# Patient Record
Sex: MALE | Race: WHITE | Employment: OTHER | URBAN - METROPOLITAN AREA
[De-identification: names, ages, dates, MRNs, and addresses within clinical notes are randomized per-mention and may not be internally consistent; named-entity substitution may affect disease eponyms.]

---

## 2017-02-07 LAB — LDL-C, EXTERNAL: 115

## 2017-04-28 ENCOUNTER — HOSPITAL ENCOUNTER (OUTPATIENT)
Dept: ULTRASOUND IMAGING | Age: 72
Discharge: HOME | End: 2017-04-28
Attending: INTERNAL MEDICINE
Payer: MEDICARE

## 2017-04-28 DIAGNOSIS — N18.9 CHRONIC KIDNEY DISEASE, UNSPECIFIED: ICD-10-CM

## 2017-04-28 PROCEDURE — 76770 US EXAM ABDO BACK WALL COMP: CPT

## 2017-08-17 ENCOUNTER — HOSPITAL ENCOUNTER (OUTPATIENT)
Dept: ULTRASOUND IMAGING | Age: 72
Discharge: HOME | End: 2017-08-17
Attending: FAMILY MEDICINE
Payer: MEDICARE

## 2017-08-17 DIAGNOSIS — R79.89 ELEVATED LFTS: ICD-10-CM

## 2017-08-17 PROCEDURE — 76700 US EXAM ABDOM COMPLETE: CPT

## 2017-12-22 ENCOUNTER — HOSPITAL ENCOUNTER (OUTPATIENT)
Dept: CT IMAGING | Age: 72
Discharge: HOME | End: 2017-12-22
Attending: SPECIALIST
Payer: MEDICARE

## 2017-12-22 DIAGNOSIS — R94.5 NONSPECIFIC ABNORMAL RESULTS OF LIVER FUNCTION STUDY: ICD-10-CM

## 2017-12-22 DIAGNOSIS — R13.10 DYSPHAGIA: ICD-10-CM

## 2017-12-22 DIAGNOSIS — R63.4 ABNORMAL WEIGHT LOSS: ICD-10-CM

## 2017-12-22 PROCEDURE — 74011636320 HC RX REV CODE- 636/320: Performed by: SPECIALIST

## 2017-12-22 PROCEDURE — 74011250636 HC RX REV CODE- 250/636: Performed by: SPECIALIST

## 2017-12-22 PROCEDURE — 74177 CT ABD & PELVIS W/CONTRAST: CPT

## 2017-12-22 PROCEDURE — 74011000255 HC RX REV CODE- 255: Performed by: SPECIALIST

## 2017-12-22 RX ORDER — SODIUM CHLORIDE 9 MG/ML
50 INJECTION, SOLUTION INTRAVENOUS
Status: COMPLETED | OUTPATIENT
Start: 2017-12-22 | End: 2017-12-22

## 2017-12-22 RX ORDER — BARIUM SULFATE 20 MG/ML
900 SUSPENSION ORAL
Status: COMPLETED | OUTPATIENT
Start: 2017-12-22 | End: 2017-12-22

## 2017-12-22 RX ORDER — SODIUM CHLORIDE 0.9 % (FLUSH) 0.9 %
10 SYRINGE (ML) INJECTION
Status: COMPLETED | OUTPATIENT
Start: 2017-12-22 | End: 2017-12-22

## 2017-12-22 RX ADMIN — SODIUM CHLORIDE 50 ML/HR: 900 INJECTION, SOLUTION INTRAVENOUS at 14:58

## 2017-12-22 RX ADMIN — BARIUM SULFATE 900 ML: 21 SUSPENSION ORAL at 14:58

## 2017-12-22 RX ADMIN — Medication 10 ML: at 14:58

## 2017-12-22 RX ADMIN — IOPAMIDOL 100 ML: 755 INJECTION, SOLUTION INTRAVENOUS at 14:58

## 2017-12-26 ENCOUNTER — HOSPITAL ENCOUNTER (OUTPATIENT)
Dept: GENERAL RADIOLOGY | Age: 72
Discharge: HOME | End: 2017-12-26
Attending: SPECIALIST
Payer: MEDICARE

## 2017-12-26 DIAGNOSIS — R63.4 LOSS OF WEIGHT: ICD-10-CM

## 2017-12-26 DIAGNOSIS — Z79.01 LONG-TERM (CURRENT) USE OF ANTICOAGULANTS: ICD-10-CM

## 2017-12-26 DIAGNOSIS — R94.5 NONSPECIFIC ABNORMAL RESULTS OF LIVER FUNCTION STUDY: ICD-10-CM

## 2017-12-26 DIAGNOSIS — R13.10 DYSPHAGIA: ICD-10-CM

## 2017-12-26 PROCEDURE — 74220 X-RAY XM ESOPHAGUS 1CNTRST: CPT

## 2018-01-05 ENCOUNTER — HOSPITAL ENCOUNTER (OUTPATIENT)
Dept: GENERAL RADIOLOGY | Age: 73
Discharge: HOME | End: 2018-01-05
Attending: PHYSICIAN ASSISTANT
Payer: MEDICARE

## 2018-01-05 DIAGNOSIS — R13.10 DYSPHAGIA: ICD-10-CM

## 2018-01-05 DIAGNOSIS — R63.4 UNEXPLAINED WEIGHT LOSS: ICD-10-CM

## 2018-01-05 DIAGNOSIS — R74.8 ABNORMAL LIVER ENZYMES: ICD-10-CM

## 2018-01-05 DIAGNOSIS — Z86.010 HX OF COLONIC POLYPS: ICD-10-CM

## 2018-01-05 DIAGNOSIS — R13.13 PHARYNGEAL DYSPHAGIA: ICD-10-CM

## 2018-01-05 PROCEDURE — G8996 SWALLOW CURRENT STATUS: HCPCS

## 2018-01-05 PROCEDURE — 74230 X-RAY XM SWLNG FUNCJ C+: CPT

## 2018-01-05 PROCEDURE — G8998 SWALLOW D/C STATUS: HCPCS

## 2018-01-05 PROCEDURE — G8997 SWALLOW GOAL STATUS: HCPCS

## 2018-01-05 PROCEDURE — 92611 MOTION FLUOROSCOPY/SWALLOW: CPT

## 2018-01-05 NOTE — PROGRESS NOTES
[address] [place] [address]    Speech Pathology Modified barium swallow Study with cms g codes  Patient: Venus Baldwin (35 y.o. male)  Date: 1/5/2018  Referring Provider:  Dr. Carey Delvalle:   Pt comes in with 40 lb wt loss. Occasionally vomits but not everyday. He reports no appetite and drinks Ensure, soup, fruit cup, oatmeal and cookies. He does not report choking or coughing on any texture. Has not had a recent pneumonia. He had a barium swallow, aspirated on that exam and then referred for MBS. Barium swallow was wnl. OBJECTIVE:   Past Medical History:  Past Medical History:   Diagnosis Date    Arrhythmia     afib    Arthritis     Atrial fibrillation ([facility] [address] Dx 5/19/14    Dr Annie Irvin [phone]    Hypertension     Hypertrophy (benign) of prostate     Thromboembolus [facility]) 2003    s/p Lt knee surgery (was on Coumadin x3 yr)     Past Surgical History:   Procedure Laterality Date    HX ORTHOPAEDIC      back surgery no hardware    HX ORTHOPAEDIC  2014    right knee surgery arthroscopy    HX ORTHOPAEDIC  3/23/15    REVISION TO LEFT TOTAL KNEE REPLACEMENT    HX PACEMAKER  8/2014    3 wire    TOTAL KNEE ARTHROPLASTY  2003    Left    TOTAL KNEE ARTHROPLASTY  2009    Right     Current Dietary Status:  Reg/thins  Radiologist: Dr. Harman Reidsom: Fluoro;Lateral  Patient Position: upright in transmotion chair    Trial 1:   Consistency Presented: Thin liquid;Puree; Solid; Nectar thick liquid   How Presented: Self-fed/presented;Cup/sip;Straw       Bolus Acceptance: No impairment   Bolus Formation/Control: No impairment:     Propulsion: Delayed (# of seconds)   Oral Residue: Lingual   Initiation of Swallow: Triggered at vallecula   Timing: Pooling 1-5 sec   Penetration:  To cords;During swallow;From initial swallow   Aspiration/Timing: After;From initial swallow   Pharyngeal Clearance: Vallecular residue;10-50% Decreased Tongue Base Retraction?: Yes     Aspiration/Penetration Score: 7 (Aspiration-Contrast passes below the cords/, but is not ejected despite attempt)     Pharyngeal-Esophageal Segment: No impairment  Pharyngeal Dysfunction: Decreased tongue base retraction;Decreased elevation/closure    Oral Phase Severity: Mild  Pharyngeal Phase Severity: Moderate    In compliance with CMSs Claims Based Outcome Reporting, the following G-code set was chosen for this patient based the use of the NOMS functional outcome to quantify this patient's level of swallowing impairment. Using the NOMS, the patient was determined to be at level 6 for swallow function which correlates with the CI= 1-19% level of severity. Based on the objective assessment provided within this note, the current, goal, and discharge g-codes are as follows:    Swallow  Swallowing:   Swallow Current Status CI= 1-19%   Swallow Goal Status CI= 1-19%   Swallow D/C Status CI= 1-19%      NOMS Swallowing Levels:  Level 1 (CN): NPO  Level 2 (CM): NPO but takes consistency in therapy  Level 3 (CL): Takes less than 50% of nutrition p.o. and continues with nonoral feedings; and/or safe with mod cues; and/or max diet restriction  Level 4 (CK): Safe swallow but needs mod cues; and/or mod diet restriction; and/or still requires some nonoral feeding/supplements  Level 5 (CJ): Safe swallow with min diet restriction; and/or needs min cues  Level 6 (CI): Independent with p.o.; rare cues; usually self cues; may need to avoid some foods or needs extra time  Level 7 ([address] [place] [address]): Independent for all p.o.  YAMILET. (2003). National Outcomes Measurement System (NOMS): Adult Speech-Language Pathology User's Guide. ASSESSMENT :  Based on the objective data described above, the patient presents with mild oral and mild to mod pharyngeal dysphagia. He has mild lingual residue requiring two swallows to clear his mouth.  Pharyngeal swallow is characterized by mild to mod swallow delay, reduced tongue base retraction, reduced laryngeal elevation/closure and subsequent vallecular residue. With thins he penetrated to the TVCs before the swallow due to the swallow delay and aspirated after the swallow when the TVCs opened. It was greater than trace amt aspirated and his cough was weak and ineffective. With purees and solids there was mild to mod vallecular residue that he did not swallow spontaneously but did with a verbal cue. His pharyngeal and laryngeal sensation are impaired. Cough is weak. He tolerated nectar thick liquids, purees and solids with no aspiration. PLAN/RECOMMENDATIONS :  The etiology for this pt's oropharyngeal dysphagia is not known. He is weak and of advanced age but may have an undiagnosed neurological disorder. Please refer to speech path if he needs it at a later date. Nectar thick liquids recommended. Discussed with the pt that all liquids should be nectar thick. No ice cream as it melts. No ice in drinks once thickened. Follow up with Dr. Madiha Berman for EGD and colonoscopy. COMMUNICATION/EDUCATION:   The above findings and recommendations were discussed with: the patient who verbalized understanding.     Thank you for this referral.  Mony Lin, SLP  Time Calculation: 20 mins

## 2018-01-12 ENCOUNTER — APPOINTMENT (OUTPATIENT)
Dept: GENERAL RADIOLOGY | Age: 73
DRG: 264 | End: 2018-01-12
Attending: INTERNAL MEDICINE
Payer: MEDICARE

## 2018-01-12 ENCOUNTER — HOSPITAL ENCOUNTER (INPATIENT)
Age: 73
LOS: 11 days | Discharge: HOME HEALTH | DRG: 264 | End: 2018-01-23
Attending: EMERGENCY MEDICINE | Admitting: HOSPITALIST
Payer: MEDICARE

## 2018-01-12 ENCOUNTER — APPOINTMENT (OUTPATIENT)
Dept: CT IMAGING | Age: 73
DRG: 264 | End: 2018-01-12
Attending: HOSPITALIST
Payer: MEDICARE

## 2018-01-12 ENCOUNTER — HOSPITAL ENCOUNTER (OUTPATIENT)
Age: 73
Setting detail: OUTPATIENT SURGERY
Discharge: OTHER INSTITUTION | End: 2018-01-12
Attending: SPECIALIST | Admitting: SPECIALIST

## 2018-01-12 VITALS
HEART RATE: 124 BPM | RESPIRATION RATE: 22 BRPM | DIASTOLIC BLOOD PRESSURE: 83 MMHG | OXYGEN SATURATION: 98 % | SYSTOLIC BLOOD PRESSURE: 137 MMHG

## 2018-01-12 DIAGNOSIS — M17.9 OSTEOARTHRITIS OF KNEE, UNSPECIFIED LATERALITY, UNSPECIFIED OSTEOARTHRITIS TYPE: Primary | ICD-10-CM

## 2018-01-12 PROBLEM — R63.4 WEIGHT LOSS: Status: ACTIVE | Noted: 2018-01-12

## 2018-01-12 PROBLEM — I48.91 ATRIAL FIBRILLATION WITH RVR (HCC): Status: ACTIVE | Noted: 2018-01-12

## 2018-01-12 LAB
ALBUMIN SERPL-MCNC: 2.5 G/DL (ref 3.5–5)
ALBUMIN/GLOB SERPL: 0.7 {RATIO} (ref 1.1–2.2)
ALP SERPL-CCNC: 149 U/L (ref 45–117)
ALT SERPL-CCNC: 230 U/L (ref 12–78)
ANION GAP SERPL CALC-SCNC: 8 MMOL/L (ref 5–15)
APTT PPP: 28.4 SEC (ref 22.1–32.5)
AST SERPL-CCNC: 240 U/L (ref 15–37)
ATRIAL RATE: 119 BPM
BASOPHILS # BLD: 0 K/UL (ref 0–0.1)
BASOPHILS NFR BLD: 0 % (ref 0–1)
BILIRUB SERPL-MCNC: 1 MG/DL (ref 0.2–1)
BNP SERPL-MCNC: 4312 PG/ML (ref 0–125)
BUN SERPL-MCNC: 26 MG/DL (ref 6–20)
BUN/CREAT SERPL: 23 (ref 12–20)
CALCIUM SERPL-MCNC: 9.3 MG/DL (ref 8.5–10.1)
CALCULATED P AXIS, ECG09: 87 DEGREES
CALCULATED R AXIS, ECG10: -78 DEGREES
CALCULATED T AXIS, ECG11: 89 DEGREES
CHLORIDE SERPL-SCNC: 108 MMOL/L (ref 97–108)
CK SERPL-CCNC: 39 U/L (ref 39–308)
CO2 SERPL-SCNC: 27 MMOL/L (ref 21–32)
CREAT SERPL-MCNC: 1.12 MG/DL (ref 0.7–1.3)
DIAGNOSIS, 93000: NORMAL
EOSINOPHIL # BLD: 0 K/UL (ref 0–0.4)
EOSINOPHIL NFR BLD: 1 % (ref 0–7)
ERYTHROCYTE [DISTWIDTH] IN BLOOD BY AUTOMATED COUNT: 15.5 % (ref 11.5–14.5)
GLOBULIN SER CALC-MCNC: 3.8 G/DL (ref 2–4)
GLUCOSE SERPL-MCNC: 95 MG/DL (ref 65–100)
HCT VFR BLD AUTO: 39.6 % (ref 36.6–50.3)
HGB BLD-MCNC: 13 G/DL (ref 12.1–17)
INR PPP: 1.3 (ref 0.9–1.1)
LYMPHOCYTES # BLD: 0.8 K/UL (ref 0.8–3.5)
LYMPHOCYTES NFR BLD: 10 % (ref 12–49)
MAGNESIUM SERPL-MCNC: 1.9 MG/DL (ref 1.6–2.4)
MCH RBC QN AUTO: 28 PG (ref 26–34)
MCHC RBC AUTO-ENTMCNC: 32.8 G/DL (ref 30–36.5)
MCV RBC AUTO: 85.2 FL (ref 80–99)
MONOCYTES # BLD: 0.8 K/UL (ref 0–1)
MONOCYTES NFR BLD: 10 % (ref 5–13)
NEUTS SEG # BLD: 6.7 K/UL (ref 1.8–8)
NEUTS SEG NFR BLD: 79 % (ref 32–75)
PLATELET # BLD AUTO: 229 K/UL (ref 150–400)
POTASSIUM SERPL-SCNC: 4.6 MMOL/L (ref 3.5–5.1)
PROT SERPL-MCNC: 6.3 G/DL (ref 6.4–8.2)
PROTHROMBIN TIME: 13.3 SEC (ref 9–11.1)
Q-T INTERVAL, ECG07: 480 MS
QRS DURATION, ECG06: 154 MS
QTC CALCULATION (BEZET), ECG08: 643 MS
RBC # BLD AUTO: 4.65 M/UL (ref 4.1–5.7)
SODIUM SERPL-SCNC: 143 MMOL/L (ref 136–145)
THERAPEUTIC RANGE,PTTT: NORMAL SECS (ref 58–77)
TROPONIN I SERPL-MCNC: <0.04 NG/ML
VENTRICULAR RATE, ECG03: 108 BPM
WBC # BLD AUTO: 8.4 K/UL (ref 4.1–11.1)

## 2018-01-12 PROCEDURE — 83880 ASSAY OF NATRIURETIC PEPTIDE: CPT | Performed by: HOSPITALIST

## 2018-01-12 PROCEDURE — 96365 THER/PROPH/DIAG IV INF INIT: CPT

## 2018-01-12 PROCEDURE — 74011250636 HC RX REV CODE- 250/636: Performed by: HOSPITALIST

## 2018-01-12 PROCEDURE — 74011250636 HC RX REV CODE- 250/636: Performed by: EMERGENCY MEDICINE

## 2018-01-12 PROCEDURE — 85610 PROTHROMBIN TIME: CPT | Performed by: EMERGENCY MEDICINE

## 2018-01-12 PROCEDURE — 74011000250 HC RX REV CODE- 250: Performed by: EMERGENCY MEDICINE

## 2018-01-12 PROCEDURE — 82550 ASSAY OF CK (CPK): CPT | Performed by: EMERGENCY MEDICINE

## 2018-01-12 PROCEDURE — 85025 COMPLETE CBC W/AUTO DIFF WBC: CPT | Performed by: EMERGENCY MEDICINE

## 2018-01-12 PROCEDURE — 93005 ELECTROCARDIOGRAM TRACING: CPT

## 2018-01-12 PROCEDURE — 74011636320 HC RX REV CODE- 636/320: Performed by: EMERGENCY MEDICINE

## 2018-01-12 PROCEDURE — 85730 THROMBOPLASTIN TIME PARTIAL: CPT | Performed by: EMERGENCY MEDICINE

## 2018-01-12 PROCEDURE — 71046 X-RAY EXAM CHEST 2 VIEWS: CPT

## 2018-01-12 PROCEDURE — 84484 ASSAY OF TROPONIN QUANT: CPT | Performed by: EMERGENCY MEDICINE

## 2018-01-12 PROCEDURE — 83735 ASSAY OF MAGNESIUM: CPT | Performed by: EMERGENCY MEDICINE

## 2018-01-12 PROCEDURE — 74011000250 HC RX REV CODE- 250: Performed by: HOSPITALIST

## 2018-01-12 PROCEDURE — 71260 CT THORAX DX C+: CPT

## 2018-01-12 PROCEDURE — 65660000000 HC RM CCU STEPDOWN

## 2018-01-12 PROCEDURE — 99285 EMERGENCY DEPT VISIT HI MDM: CPT

## 2018-01-12 PROCEDURE — 74011000258 HC RX REV CODE- 258: Performed by: EMERGENCY MEDICINE

## 2018-01-12 PROCEDURE — 74011250637 HC RX REV CODE- 250/637: Performed by: INTERNAL MEDICINE

## 2018-01-12 PROCEDURE — 80053 COMPREHEN METABOLIC PANEL: CPT | Performed by: EMERGENCY MEDICINE

## 2018-01-12 PROCEDURE — 94762 N-INVAS EAR/PLS OXIMTRY CONT: CPT

## 2018-01-12 PROCEDURE — 74011250637 HC RX REV CODE- 250/637: Performed by: HOSPITALIST

## 2018-01-12 PROCEDURE — 96374 THER/PROPH/DIAG INJ IV PUSH: CPT

## 2018-01-12 PROCEDURE — 74011000258 HC RX REV CODE- 258: Performed by: HOSPITALIST

## 2018-01-12 RX ORDER — MIDAZOLAM HYDROCHLORIDE 1 MG/ML
1-2 INJECTION, SOLUTION INTRAMUSCULAR; INTRAVENOUS
Status: DISCONTINUED | OUTPATIENT
Start: 2018-01-12 | End: 2018-01-12 | Stop reason: HOSPADM

## 2018-01-12 RX ORDER — AMIODARONE HYDROCHLORIDE 200 MG/1
400 TABLET ORAL 3 TIMES DAILY
Status: DISCONTINUED | OUTPATIENT
Start: 2018-01-12 | End: 2018-01-15

## 2018-01-12 RX ORDER — METOPROLOL SUCCINATE 50 MG/1
50 TABLET, EXTENDED RELEASE ORAL DAILY
Status: DISCONTINUED | OUTPATIENT
Start: 2018-01-12 | End: 2018-01-13

## 2018-01-12 RX ORDER — SODIUM CHLORIDE 9 MG/ML
50 INJECTION, SOLUTION INTRAVENOUS CONTINUOUS
Status: DISCONTINUED | OUTPATIENT
Start: 2018-01-12 | End: 2018-01-12 | Stop reason: HOSPADM

## 2018-01-12 RX ORDER — SODIUM CHLORIDE 0.9 % (FLUSH) 0.9 %
5-10 SYRINGE (ML) INJECTION AS NEEDED
Status: DISCONTINUED | OUTPATIENT
Start: 2018-01-12 | End: 2018-01-12 | Stop reason: HOSPADM

## 2018-01-12 RX ORDER — POTASSIUM CHLORIDE 750 MG/1
10 CAPSULE, EXTENDED RELEASE ORAL DAILY
COMMUNITY
End: 2018-01-23

## 2018-01-12 RX ORDER — SODIUM CHLORIDE 0.9 % (FLUSH) 0.9 %
5-10 SYRINGE (ML) INJECTION EVERY 8 HOURS
Status: DISCONTINUED | OUTPATIENT
Start: 2018-01-12 | End: 2018-01-12 | Stop reason: HOSPADM

## 2018-01-12 RX ORDER — ENOXAPARIN SODIUM 100 MG/ML
1 INJECTION SUBCUTANEOUS EVERY 12 HOURS
Status: DISPENSED | OUTPATIENT
Start: 2018-01-12 | End: 2018-01-17

## 2018-01-12 RX ORDER — FENTANYL CITRATE 50 UG/ML
25 INJECTION, SOLUTION INTRAMUSCULAR; INTRAVENOUS
Status: DISCONTINUED | OUTPATIENT
Start: 2018-01-12 | End: 2018-01-12 | Stop reason: HOSPADM

## 2018-01-12 RX ORDER — FAMOTIDINE 20 MG/1
20 TABLET, FILM COATED ORAL 2 TIMES DAILY
Status: DISCONTINUED | OUTPATIENT
Start: 2018-01-12 | End: 2018-01-23 | Stop reason: HOSPADM

## 2018-01-12 RX ORDER — ACETAMINOPHEN 325 MG/1
650 TABLET ORAL
Status: DISCONTINUED | OUTPATIENT
Start: 2018-01-12 | End: 2018-01-23 | Stop reason: HOSPADM

## 2018-01-12 RX ORDER — SODIUM CHLORIDE 0.9 % (FLUSH) 0.9 %
5-10 SYRINGE (ML) INJECTION EVERY 8 HOURS
Status: DISCONTINUED | OUTPATIENT
Start: 2018-01-12 | End: 2018-01-23 | Stop reason: HOSPADM

## 2018-01-12 RX ORDER — SODIUM CHLORIDE 0.9 % (FLUSH) 0.9 %
10 SYRINGE (ML) INJECTION
Status: COMPLETED | OUTPATIENT
Start: 2018-01-12 | End: 2018-01-12

## 2018-01-12 RX ORDER — ONDANSETRON 2 MG/ML
4 INJECTION INTRAMUSCULAR; INTRAVENOUS
Status: DISCONTINUED | OUTPATIENT
Start: 2018-01-12 | End: 2018-01-23 | Stop reason: HOSPADM

## 2018-01-12 RX ORDER — SODIUM CHLORIDE 9 MG/ML
50 INJECTION, SOLUTION INTRAVENOUS
Status: COMPLETED | OUTPATIENT
Start: 2018-01-12 | End: 2018-01-13

## 2018-01-12 RX ORDER — SODIUM CHLORIDE 0.9 % (FLUSH) 0.9 %
5-10 SYRINGE (ML) INJECTION AS NEEDED
Status: DISCONTINUED | OUTPATIENT
Start: 2018-01-12 | End: 2018-01-23 | Stop reason: HOSPADM

## 2018-01-12 RX ORDER — DEXTROMETHORPHAN/PSEUDOEPHED 2.5-7.5/.8
1.2 DROPS ORAL
Status: DISCONTINUED | OUTPATIENT
Start: 2018-01-12 | End: 2018-01-12 | Stop reason: HOSPADM

## 2018-01-12 RX ADMIN — METOPROLOL SUCCINATE 50 MG: 50 TABLET, EXTENDED RELEASE ORAL at 14:24

## 2018-01-12 RX ADMIN — SODIUM CHLORIDE 15 MG/HR: 900 INJECTION, SOLUTION INTRAVENOUS at 22:18

## 2018-01-12 RX ADMIN — AMIODARONE HYDROCHLORIDE 400 MG: 200 TABLET ORAL at 19:33

## 2018-01-12 RX ADMIN — SODIUM CHLORIDE 10 MG/HR: 900 INJECTION, SOLUTION INTRAVENOUS at 14:26

## 2018-01-12 RX ADMIN — IOPAMIDOL 100 ML: 612 INJECTION, SOLUTION INTRAVENOUS at 15:23

## 2018-01-12 RX ADMIN — FAMOTIDINE 20 MG: 20 TABLET, FILM COATED ORAL at 19:33

## 2018-01-12 RX ADMIN — Medication 10 ML: at 15:24

## 2018-01-12 RX ADMIN — SODIUM CHLORIDE 50 ML/HR: 900 INJECTION, SOLUTION INTRAVENOUS at 15:24

## 2018-01-12 RX ADMIN — AMIODARONE HYDROCHLORIDE 400 MG: 200 TABLET ORAL at 11:45

## 2018-01-12 RX ADMIN — SODIUM CHLORIDE 5 MG/HR: 900 INJECTION, SOLUTION INTRAVENOUS at 12:12

## 2018-01-12 RX ADMIN — ENOXAPARIN SODIUM 90 MG: 100 INJECTION SUBCUTANEOUS at 14:24

## 2018-01-12 NOTE — CONSULTS
Consult    NAME: Sammy Tran   :     MRN:  [id number]     Date/Time:  2018 10:56 AM    Patient PCP: Dianna Gonzalez MD  ________________________________________________________________________     Assessment:     1. Weight loss, difficulty swallowing, CT of abd/pelvis unrevealing, after bowel prep found to be in Afib with RVR  2. Stress  with no ischemia  3. Hx of cardiomyopathy with EF 30% s/p BiV ICD, last EF normalized  4. Hx of PAF s/p cardioversion had been in sinus on xarelto and amiodarone  5. HTN  6. CKD  7. S/p bilteral Knee replacement, hx of prosthesis infection  8. 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 Cardiologist:  Dawson        Plan:     Severe weight loss, >40lbs, difficulty swallowing, nausea, undergoing GI evaluation. Found to be in afib with RVR this AM with dyspnea. Discussed with anesthesia and Dr. Deboraha Hodgkins, needs blood work, has not had in >6wks, needs CXR will send to ER. Will need hospitlist admission. Once labs, breathing status, HR optimized will need endoscopy    1. Last xarelto was this past Tuesday, once labs checked would start lovenox for afib  2. Increase amiodarone 400mg po tid while in hospital, discharge on lower dose  3. Cardizem ggt for rate control  4. Cont metoprolol ER 25mg  5. Cont losartan  6. Hold lasix, kcl for now          [x]        High complexity decision making was performed        Subjective:   CHIEF COMPLAINT: Dyspnea, afib with RVR    HISTORY OF PRESENT ILLNESS:     Recent weight loss, dyspnea, seen by primary and GI.  CT and ultrasound of abdomen ok. Barium swallow with aspiration. Losing 5lbs /wk, difficulty swallowing. Saw Dr. Caroline Mancilla last week and was in sinus. No chest pain. Progressive dyspnea. Some orthopnea. No sig edema. No palpitations, no syncope. Took bowel prep and this Am found to be in afib with RVR, dyspnea walking into center.       We were asked to consult for work up and evaluation of the above problems. Past Medical History:   Diagnosis Date    Arrhythmia     afib    Arthritis     Atrial fibrillation ([address] Dx 5/19/14    Dr Richard Gray [phone]    Hypertension     Hypertrophy (benign) of prostate     Thromboembolus [facility]) 2003    s/p Lt knee surgery (was on Coumadin x3 yr)      Past Surgical History:   Procedure Laterality Date    HX ORTHOPAEDIC      back surgery no hardware    HX ORTHOPAEDIC  2014    right knee surgery arthroscopy    HX ORTHOPAEDIC  3/23/15    REVISION TO LEFT TOTAL KNEE REPLACEMENT    HX PACEMAKER  8/2014    3 wire    TOTAL KNEE ARTHROPLASTY  2003    Left    TOTAL KNEE ARTHROPLASTY  2009    Right     No Known Allergies   Meds:  See below  Social History   Substance Use Topics    Smoking status: Former Smoker     Packs/day: 1.00     Years: 30.00     Quit date: 5/23/2004    Smokeless tobacco: Never Used    Alcohol use No      Comment: no alcohol for the pask 6 months      Family History   Problem Relation Age of Onset    Cancer Mother      throat cancer    Cancer Father      kidney cancer    Hypertension Sister        REVIEW OF SYSTEMS:     []         Unable to obtain  ROS due to ---   [x]         Total of 12 systems reviewed as follows:     Total of 12 systems reviewed as follows:       POSITIVE= Bold text  Negative = normal text  General:  fever, chills, sweats, generalized weakness, weight loss/gain,      loss of appetite   Eyes:    blurred vision, eye pain, loss of vision, double vision  ENT:    rhinorrhea, pharyngitis   Respiratory:   cough, sputum production, SOB, GARCIA, wheezing, pleuritic pain   Cardiology:   chest pain, palpitations, orthopnea, PND, edema, syncope   Gastrointestinal:  abdominal pain , N/V, diarrhea, dysphagia, constipation, bleeding   Genitourinary:  frequency, urgency, dysuria, hematuria, incontinence   Muskuloskeletal :  arthralgia, myalgia, back pain  Hematology:  easy bruising, nose or gum bleeding, lymphadenopathy   Dermatological: rash, ulceration, pruritis, color change / jaundice  Endocrine:   hot flashes or polydipsia   Neurological:  headache, dizziness, confusion, focal weakness, paresthesia,     Speech difficulties, memory loss, gait difficulty  Psychological: Feelings of anxiety, depression, agitation    Objective:      Physical Exam:    Last 24hrs VS reviewed since prior progress note. Most recent are: There were no vitals taken for this visit. No intake or output data in the 24 hours ending 01/12/18 1056     General Appearance: Well developed, cachectic, alert & oriented x 3,    no acute distress. Ears/Nose/Mouth/Throat: Pupils equal and round, Hearing grossly normal.  Neck: Supple. JVP within normal limits. Carotids good upstrokes, with no bruit. Chest: Lungs clear to auscultation bilaterally. Cardiovascular: Irregular rate and rhythm, S1S2 normal, no murmur, rubs, gallops. Abdomen: Soft, non-tender, bowel sounds are active. No organomegaly. Extremities: Trace edema bilaterally. Femoral pulses +2, Distal Pulses +1. Skin: Warm and dry. Neuro: CN II-XII grossly intact, Strength and sensation grossly intact. Data:      Prior to Admission medications    Medication Sig Start Date End Date Taking? Authorizing Provider   potassium chloride SA (MICRO-K) 10 mEq capsule Take 10 mEq by mouth daily. Historical Provider   rivaroxaban (XARELTO) 20 mg tab tablet Take 20 mg by mouth daily. Historical Provider   famotidine (PEPCID) 20 mg tablet Take 1 Tab by mouth two (2) times a day. 4/30/15   Theresa Morrissey MD   losartan-hydrochlorothiazide [facility]) 100-25 mg per tablet Take 1 Tab by mouth daily. DO NOT TAKE FOR BLOOD PRESSURE UNDER 130/80 3/26/15   Tadeo Napoles NP   furosemide (LASIX) 40 mg tablet DO NOT TAKE FOR BLOOD PRESSURE UNDER 130/80 3/26/15   Tadeo Napoles NP   metoprolol succinate (TOPROL-XL) 50 mg XL tablet Take 50 mg by mouth daily.     Historical Provider   amiodarone (PACERONE) 400 mg tablet Take 1 Tab by mouth daily. 7/14/14   Marc Pérez MD       No results found for this or any previous visit (from the past 24 hour(s)).

## 2018-01-12 NOTE — PROGRESS NOTES
Patient on continuous monitoring while waiting for Dr. Dorene Gibson to call back and Dr. Jose Armando Leal to make a decision whether or not we are going forward with procedure. Patient continues to deny any chest pain. SOB appears less, patient is on 2L NC, 98%. Wife at bedside.

## 2018-01-12 NOTE — PROGRESS NOTES
Patient with increased heart rate, 120's-140's when hooked to monitor in preop. Patient is SOB. Oxygen saturation 97%. Patient denies chest pain. Patient states he has been SOB 4-5 months. He is supposed to get Domi Ego" of his heart on Monday, per patient. Dr. Tom Vega in to assess patient.

## 2018-01-12 NOTE — IP AVS SNAPSHOT
[address] [facility] 
[phone] Patient: Michelle Rob MRN: [id number] CVU:7/69/4001 About your hospitalization You were admitted on:  January 12, 2018 You last received care in the:  [facility] [place] [facility]ARDIOPULMONARY CARE You were discharged on:  January 23, 2018 Why you were hospitalized Your primary diagnosis was:  Atrial Fibrillation With Rvr (Hcc) Your diagnoses also included:  Weight Loss, Hyperthyroidism Follow-up Information Follow up With Details Comments Contact Info Roger Figueroa MD On 1/26/2018 10;00am  for a post hospital follow up appointment with your PCP. Follow-up labs [address] [facility] 
[address] [address] [phone] [address] [facility]   [address] 
1st Floor [facility] [address] 
[phone] Ning Santillan MD In 2 weeks hospital follow-up. Dr. Aayush Hampton nurse will call you to make the appointment. call them if you have not heard from them by tomorrow afternoon. [address] [facility] 
[phone] Naman Quan MD In 5 weeks  [address] [facility] [address] [facility] 
[phone] Radha Flowers MD In 6 weeks hospital follow-up [address] [facility] 
[phone] Your Scheduled Appointments Tuesday February 13, 2018  8:00 AM EST New Patient with Michelle Cool MD  
Neurology Clinic at [facility]) [address] [facility], 
[address] [place] [address], [address] [facility]  
[phone] Discharge Orders None A check jose a indicates which time of day the medication should be taken. My Medications START taking these medications Instructions Each Dose to Equal  
 Morning Noon Evening Bedtime  
 dilTIAZem  mg ER capsule Commonly known as:  CARDIZEM CD  
 Start taking on:  1/24/2018 Take 1 Cap by mouth daily. 180 mg  
    
  
   
   
   
  
 lansoprazole 30 mg capsule Commonly known as:  PREVACID Take 1 Cap by mouth Daily (before breakfast) for 30 days. 30 mg  
    
   
   
   
  
 methIMAzole 10 mg tablet Commonly known as:  TAPAZOLE Your next dose is: Today Take 2 Tabs by mouth two (2) times a day. With food 20 mg  
    
   
   
  
   
  
 ondansetron hcl 4 mg tablet Commonly known as:  ZOFRAN (AS HYDROCHLORIDE) Take 1 Tab by mouth every eight (8) hours as needed for Nausea. 4 mg OTHER  
   
 CBC, CMP, LFT's in 1-2 weeks TSh, Free T 4, total T3 in 6-8 weeks  
     
   
   
   
  
 oxyCODONE IR 5 mg immediate release tablet Commonly known as:  Timo Herb Take 1 Tab by mouth every four (4) hours as needed. Max Daily Amount: 30 mg. Indications: Pain  
 5 mg  
    
   
   
   
  
 valsartan 80 mg tablet Commonly known as:  DIOVAN Start taking on:  1/24/2018 Take 1 Tab by mouth daily. 80 mg CONTINUE taking these medications Instructions Each Dose to Equal  
 Morning Noon Evening Bedtime  
 amiodarone 400 mg tablet Commonly known as:  Ross Angeline Start taking on:  1/24/2018 Take 1 Tab by mouth daily. 400 mg  
    
  
   
   
   
  
 metoprolol succinate 50 mg XL tablet Commonly known as:  TOPROL-XL Your next dose is:  Tomorrow Take 50 mg by mouth daily. 50 mg XARELTO 20 mg Tab tablet Generic drug:  rivaroxaban Your next dose is:  Tomorrow Take 20 mg by mouth daily. 20 mg  
    
  
   
   
   
  
  
STOP taking these medications   
 furosemide 40 mg tablet Commonly known as:  LASIX  
   
  
 losartan 100 mg tablet Commonly known as:  COZAAR  
   
  
 potassium chloride SA 10 mEq capsule Commonly known as:  MICRO-K  
   
  
 PROBIOTIC 4X 10-15 mg Tbec Generic drug:  B.infantis-B.ani-B.long-B.bifi Where to Get Your Medications These medications were sent to [facility]/pharmacy #[id number]- [address], [address]  [address] [facility], [address] [place] [address] Phone:  [phone]  
  methIMAzole 10 mg tablet Information on where to get these meds will be given to you by the nurse or doctor. ! Ask your nurse or doctor about these medications  
  amiodarone 400 mg tablet  
 dilTIAZem  mg ER capsule  
 lansoprazole 30 mg capsule  
 ondansetron hcl 4 mg tablet OTHER  
 oxyCODONE IR 5 mg immediate release tablet  
 valsartan 80 mg tablet Discharge Instructions Dr. Douglas Guillen discharge instructions: 
 
1) It appears you have a condition called hyperthyroidism that is the cause of your symptoms of fatigue, weight loss, and shortness of breath. You will need to take a medication called methimazole to help slow down your metabolism to help treat these symptoms. Often patients need treatment for 12-18 months to try and normalize your levels. 2) Begin methimazole 10 mg tablet and take 2 tabs with breakfast and dinner until you come back to see me. This will be ready for  at the pharmacy today. 3) Please watch for the following side effects from methimazole and let me know if you develop any of them: 
- rash (in up to 5% of patients) 
- nausea/vomiting (as long as you take this with food, this shouldn't be an issue) 
- joint pains (usually is only seen in elderly patients) 
- abnormal liver function tests (if you develop pain under the right side of your rib cage, or nausea/vomiting, or jaundice where your eyes are turning yellow, please stop the med immediately and call me) - low white blood cell count (if you develop a fever or sore throat, usually this is from a viral infection, however, if this persists for more than 3 days, please let me know so we can draw your blood count just to make sure your white blood cell count isn't going low) 4) Our office will call you to arrange a follow up appointment in 6-8 weeks. Our office is located at: 
[address] [place] [address] (TWO), 3rd floor, [address], [address] [facility] [address] 
[phone] (phone) [phone] (fax) 5) Please go to your local [facility] 3-4 days before your next appointment to have your labs drawn. The order is already in the computer and ask to have your labs drawn under Dr. Zahra oden. 6) Please don't hesitate to call [phone] with further questions. HOSPITALIST DISCHARGE INSTRUCTIONS 
 
NAME: Samantha Mcnally :  1945 MRN:  [id number] Date/Time:  2018 11:54 AM 
 
ADMIT DATE: 2018 DISCHARGE DATE: 2018 · It is important that you take the medication exactly as they are prescribed. · Keep your medication in the bottles provided by the pharmacist and keep a list of the medication names, dosages, and times to be taken in your wallet. · Do not take other medications without consulting your doctor. What to do at [facility] Recommended diet:  Cardiac Diet Recommended activity: Activity as tolerated and PT/OT per Home Health If you have questions regarding the hospital related prescriptions or hospital related issues please call [facility] at [phone]. You can always direct your questions to your primary care doctor if you are unable to reach your hospital physician; your PCP works as an extension of your hospital doctor just like your hospital doctor is an extension of your PCP for your time at the hospital [facility]) If you experience any of the following symptoms then please call your primary care physician or return to the emergency room if you cannot get hold of your doctor: Fever, chills, nausea, vomiting, or persistent diarrhea Worsening weakness or new problems with your speech or balance Dark stools or visible blood in your stools New Leg swelling or shortness of breath as these could be signs of a clot Additional Instructions: 
 
 
Bring these papers with you to your follow up appointments. The papers will help your doctors be sure to continue the care plan from the hospital. 
 
 
 
 
 
 
Information obtained by : 
I understand that if any problems occur once I am at home I am to contact my physician. I understand and acknowledge receipt of the instructions indicated above. Physician's or R.N.'s Signature                                                                  Date/Time Patient or Representative Signature Introducing [facility] [place] [facility] SERVICES! Dear Chandrakant Storey: Thank you for requesting a [employer] account. Our records indicate that you already have an active [employer] account. You can access your account anytime at [email or web address] Did you know that you can access your hospital and ER discharge instructions at any time in [employer]? You can also review all of your test results from your hospital stay or ER visit. Additional Information If you have questions, please visit the Frequently Asked Questions section of the [employer] website at [email or web address] Remember, [employer] is NOT to be used for urgent needs. For medical emergencies, dial 911. Now available from your iPhone and Android! Unresulted Labs-Please follow up with your PCP about these lab tests Order Current Status T3 TOTAL In process TSH RECEPTOR AB In process Providers Seen During Your Hospitalization Provider Specialty Primary office phone Sherice Khan DO Emergency Medicine [phone] Carmen Ogden MD Internal Medicine [phone] Benja Gonzalez MD Hospitalist [phone] Karly Yoo MD Internal Medicine [phone] Your Primary Care Physician (PCP) Primary Care Physician Office Phone Office Fax [address] [phone] You are allergic to the following No active allergies Recent Documentation Height Weight BMI Smoking Status 1.93 m 87.7 kg 23.54 kg/m2 Former Smoker Emergency Contacts Name Discharge Info Relation Home Work Mobile Mary Anne Hugo DISCHARGE CAREGIVER [3] Spouse [3] [phone] Patient Belongings The following personal items are in your possession at time of discharge: 
  Dental Appliances: Uppers, Lowers (in pt room )  Visual Aid: Glasses, With patient   Hearing Aids/Status: Does not own  Home Medications: None   Jewelry: Ring, With patient  Clothing: None    Other Valuables: None  Personal Items Sent to Safe: 1 metal ring secured with security Please provide this summary of care documentation to your next provider. Signatures-by signing, you are acknowledging that this After Visit Summary has been reviewed with you and you have received a copy. Patient Signature:  ____________________________________________________________ Date:  ____________________________________________________________  
  
Emani Mealing Provider Signature:  ____________________________________________________________ Date:  ____________________________________________________________

## 2018-01-12 NOTE — PROGRESS NOTES
Patient transferred to ER on monitor to room 36. Report given to ER nurse, Jalyn who will assume care of patient. Wife and belongings with patient. Celeste Lane RN currently hooking patient up to ER monitors.

## 2018-01-12 NOTE — PROGRESS NOTES
Verona Zuniga 19 charge nurse states patient may be taken to room 36. Will start IV and take patient over. Wife remains at bedside. Patient remains without pain or any complaints besides wanting to drink water.

## 2018-01-12 NOTE — CONSULTS
Gastroenterology Consult     Referring Physician: Irene Thornton  PCP:  Tracy Sanz MD  Gastroenterologist:  Griffin Mak MD     Consult Date: 1/12/2018     Subjective:     Chief Complaint: gi issues is complex patient    History of Present Illness: You Bergman is a 71 y.o. male who is seen in consultation for the above issues    I saw him in the office in [place] [id number] for:  He has anorexia and he throws up after eating. At lunchtime he was going to eat a bowl of soup. He has lost 30 lbs. over two months. no change in bowels. no blood. no burning, no indigestion    + xarelto     Last colonoscopy \"bad experience about 11 years ago\" [address] [facility] Patient \"fought\". anesthesia was suggested. + heavy etoh in past. 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 Bas was abnormal:  IMPRESSION: Positive aspiration. A modified barium swallow with speech  therapist in attendance is recommended to better characterize. We sent him to speech who raised concerns about a neuro problem and they were consulted but not yet schedule. A speech therapy eval was ordered    Today he was to have an egd and colonoscopyo by Dr Gricel Whitehead. A fib was found and he was admitted. Procedures were cancelled.       .    Past Medical History:   Diagnosis Date    Arrhythmia     afib    Arthritis     Atrial fibrillation ([facility] [address] Dx 5/19/14    Dr Vini Hernandez [phone]    Hypertension     Hypertrophy (benign) of prostate     Thromboembolus [facility]) 2003    s/p Lt knee surgery (was on Coumadin x3 yr)     Past Surgical History:   Procedure Laterality Date    HX ORTHOPAEDIC      back surgery no hardware    HX ORTHOPAEDIC  2014    right knee surgery arthroscopy    HX ORTHOPAEDIC  3/23/15    REVISION TO LEFT TOTAL KNEE REPLACEMENT    HX PACEMAKER  8/2014    3 wire    TOTAL KNEE ARTHROPLASTY  2003    Left    TOTAL KNEE ARTHROPLASTY  2009    Right      Family History   Problem Relation Age of Onset    Cancer Mother      throat cancer    Cancer Father      kidney cancer    Hypertension Sister      Social History   Substance Use Topics    Smoking status: Former Smoker     Packs/day: 1.00     Years: 30.00     Quit date: 5/23/2004    Smokeless tobacco: Never Used    Alcohol use No      Comment: no alcohol for the pask 6 months      No Known Allergies  Current Facility-Administered Medications   Medication Dose Route Frequency    amiodarone (CORDARONE) tablet 400 mg  400 mg Oral TID    dilTIAZem (CARDIZEM) 100 mg in 0.9% sodium chloride (MBP/ADV) 100 mL infusion  0-15 mg/hr IntraVENous TITRATE    sodium chloride (NS) flush 5-10 mL  5-10 mL IntraVENous Q8H    sodium chloride (NS) flush 5-10 mL  5-10 mL IntraVENous PRN    acetaminophen (TYLENOL) tablet 650 mg  650 mg Oral Q6H PRN    ondansetron (ZOFRAN) injection 4 mg  4 mg IntraVENous Q6H PRN    enoxaparin (LOVENOX) injection 90 mg  1 mg/kg SubCUTAneous Q12H    famotidine (PEPCID) tablet 20 mg  20 mg Oral BID    metoprolol succinate (TOPROL-XL) XL tablet 50 mg  50 mg Oral DAILY     Current Outpatient Prescriptions   Medication Sig    potassium chloride SA (MICRO-K) 10 mEq capsule Take 10 mEq by mouth daily.  rivaroxaban (XARELTO) 20 mg tab tablet Take 20 mg by mouth daily.  famotidine (PEPCID) 20 mg tablet Take 1 Tab by mouth two (2) times a day.  losartan-hydrochlorothiazide (HYZAAR) 100-25 mg per tablet Take 1 Tab by mouth daily. DO NOT TAKE FOR BLOOD PRESSURE UNDER 130/80    furosemide (LASIX) 40 mg tablet DO NOT TAKE FOR BLOOD PRESSURE UNDER 130/80    metoprolol succinate (TOPROL-XL) 50 mg XL tablet Take 50 mg by mouth daily.  amiodarone (PACERONE) 400 mg tablet Take 1 Tab by mouth daily. Review of Systems:  A detailed 10 organ review of systems is obtained with pertinent positives as listed in the History of Present Illness and Past Medical History. All others are negative.     See below:  Cardiovascular: Denies chest pain, irregular heart beat, palpitations, peripheral edema, syncope, Sweats. Constitutional: Presents suffers from fatigue, loss of appetite, weight loss. Denies fever, weight gain. ENMT: Denies nose bleeds, sore throat, hearing loss. Endocrine: Denies excessive thirst, heat intolerance. Eyes: Denies loss of vision. Gastrointestinal: Presents suffers from nausea, vomiting. Denies abdominal pain, abdominal swelling, change in bowel habits, constipation, diarrhea, Bloating/gas, heartburn, jaundice, rectal bleeding, stomach cramps, dysphagia, rectal pain, Stool incontinence. Genitourinary: Denies dark urine, dysuria, frequent urination, hematuria, incontinence. Hematologic/Lymphatic: Denies easy bruising, prolonged bleeding. Integumentary: Denies itching, rashes, sun sensitivity. Musculoskeletal: Presents suffers from arthritis, joint pain. Denies back pain, gout, muscle weakness, stiffness. Neurological: Denies dizziness, fainting, frequent headaches, memory loss. Psychiatric: Presents suffers from difficulty sleeping. Denies anxiety, depression, hallucinations, nervousness, panic attacks, paranoia. Respiratory: Presents suffers       Objective:     Physical Exam:  Visit Vitals    /90    Pulse (!) 103    Resp 25    Ht 6' 4\" (1.93 m)    Wt 86.5 kg (190 lb 11.2 oz)    SpO2 98%    BMI 23.21 kg/m2        Vital Signs:  See above  General:   No distress  Eyes:  No icterus; extraocular movements intact  ENMT:  Lips, teeth, gums, oropharynx unremarkable. Chest:  Clear to auscultation; no use of accessory muscles  Heart:  Regular rate and rhythm. Normal S1 and S2. No abnormal sounds  Abdomen:  Non-tender; bowel sounds present.   No hepatosplenomegaly  Lymphatic:  No anterior/ posterior cervical, supraclavicular or inquinal lymphadenopathy  Neurologic:  Alert and oriented  Psyc:  Affect is appropriate  Extremities:  No edema   Ct was unk:  IMPRESSION  IMPRESSION:  Unremarkable CT through the abdomen and pelvis.        Lab/Data Review:  Recent Labs      01/12/18   1117   WBC  8.4   HGB  13.0   HCT  39.6   PLT  229     Recent Labs      01/12/18   1117   NA  143   K  4.6   CL  108   CO2  27   BUN  26*   CREA  1.12   GLU  95   CA  9.3   MG  1.9     Recent Labs      01/12/18   1117   SGOT  240*   ALT  230*   AP  149*   TBILI  1.0   TP  6.3*   ALB  2.5*   GLOB  3.8     Recent Labs      01/12/18   1117   INR  1.3*   PTP  13.3*   APTT  28.4      No results for input(s): FE, TIBC, PSAT, FERR in the last 72 hours. No results found for: FOL, RBCF   No results for input(s): PH, PCO2, PO2 in the last 72 hours.   Recent Labs      01/12/18   1117   TROIQ  <0.04     No results found for: CHOL, CHOLX, CHLST, CHOLV, HDL, LDL, LDLC, DLDLP, TGLX, TRIGL, TRIGP, CHHD, CHHDX  Lab Results   Component Value Date/Time    Glucose (POC) 114 01/12/2015 02:27 PM     Lab Results   Component Value Date/Time    Color YELLOW/STRAW 03/19/2015 01:20 PM    Appearance CLEAR 03/19/2015 01:20 PM    Specific gravity 1.009 03/19/2015 01:20 PM    pH (UA) 7.0 03/19/2015 01:20 PM    Protein NEGATIVE  03/19/2015 01:20 PM    Glucose NEGATIVE  03/19/2015 01:20 PM    Ketone NEGATIVE  03/19/2015 01:20 PM    Bilirubin NEGATIVE  03/19/2015 01:20 PM    Urobilinogen 0.2 03/19/2015 01:20 PM    Nitrites NEGATIVE  03/19/2015 01:20 PM    Leukocyte Esterase NEGATIVE  03/19/2015 01:20 PM    Epithelial cells FEW 03/19/2015 01:20 PM    Bacteria NEGATIVE  03/19/2015 01:20 PM    WBC 0-4 03/19/2015 01:20 PM    RBC 0-5 03/19/2015 01:20 PM        Assessment/Plan:     Active Problems:    Atrial fibrillation with RVR ([address] (1/12/2018)         Weight loss  Dysphagia        Needs egd /colonoscopy, possibly as inpatient 1.15  cover with lovenox in interim  Dr. Aliyah Hough to follow

## 2018-01-12 NOTE — PROGRESS NOTES
Patient is alert and oriented.    Blood pressure 133/77  Heart rate 113  SpO2 96 on Room Air  RR 26  Resting in bed with bed rails up, call naranjo in reach, Wife Anne Krueger at the bed side

## 2018-01-12 NOTE — PROGRESS NOTES
Dr. Kyle Barba has spoken to Dr. Jun Vivas who states that one of his partners will be down to Endo to see patient. Wife and Patient aware. Patient remains resting comfortably in preop room.

## 2018-01-12 NOTE — ED PROVIDER NOTES
EMERGENCY DEPARTMENT HISTORY AND PHYSICAL EXAM      Date: 1/12/2018  Patient Name: Nick Centeno    History of Presenting Illness     Chief Complaint   Patient presents with    Irregular Heart Beat     Arrives via stretcher from OP endo for EGD and colonoscopy with afib       History Provided By: Patient and Patient's Wife    HPI: Nick Centeno, 71 y.o. male with PMHx significant for afib s/p Biv ICD, presents from endoscopy suite to the ED with cc of afib with RVR. Patient presented to endoscopy for planned upper and lower endoscopy for recent weight loss ~40lbs who was found to have in afib with RVR to 110s with no associated chest pain. The only symptom was 3 weeks of SOB on exertion, but no palpitations. He was seen by cardiology upstairs before transfer. Patient states that he has also had about 5lbs of weight loss per week for months. He has trouble swallowing food and liquids and sometimes regurgitates when he swallows. He denies recent fevers, chills or diarrhea. Has been holding anticoagulation for 2 days for endoscopy and has not taken his morning medications today. PCP: Brittany Parr MD    There are no other complaints, changes, or physical findings at this time. Current Facility-Administered Medications   Medication Dose Route Frequency Provider Last Rate Last Dose    amiodarone (CORDARONE) tablet 400 mg  400 mg Oral TID Bella Davison MD   400 mg at 01/12/18 1145    dilTIAZem (CARDIZEM) 100 mg in 0.9% sodium chloride (MBP/ADV) 100 mL infusion  0-15 mg/hr IntraVENous TITRATE Bro Dimas MD         Current Outpatient Prescriptions   Medication Sig Dispense Refill    potassium chloride SA (MICRO-K) 10 mEq capsule Take 10 mEq by mouth daily.  rivaroxaban (XARELTO) 20 mg tab tablet Take 20 mg by mouth daily.  famotidine (PEPCID) 20 mg tablet Take 1 Tab by mouth two (2) times a day.  60 Tab 11    losartan-hydrochlorothiazide (HYZAAR) 100-25 mg per tablet Take 1 Tab by mouth daily. DO NOT TAKE FOR BLOOD PRESSURE UNDER 130/80 1 Tab 0    furosemide (LASIX) 40 mg tablet DO NOT TAKE FOR BLOOD PRESSURE UNDER 130/80 1 Tab 0    metoprolol succinate (TOPROL-XL) 50 mg XL tablet Take 50 mg by mouth daily.  amiodarone (PACERONE) 400 mg tablet Take 1 Tab by mouth daily. 30 Tab 12       Past History     Past Medical History:  Past Medical History:   Diagnosis Date    Arrhythmia     afib    Arthritis     Atrial fibrillation ([address] Dx 5/19/14    Dr Daugherty [facility] [phone]    Hypertension     Hypertrophy (benign) of prostate     Thromboembolus [facility]) 2003    s/p Lt knee surgery (was on Coumadin x3 yr)       Past Surgical History:  Past Surgical History:   Procedure Laterality Date    HX ORTHOPAEDIC      back surgery no hardware    HX ORTHOPAEDIC  2014    right knee surgery arthroscopy    HX ORTHOPAEDIC  3/23/15    REVISION TO LEFT TOTAL KNEE REPLACEMENT    HX PACEMAKER  8/2014    3 wire    TOTAL KNEE ARTHROPLASTY  2003    Left    TOTAL KNEE ARTHROPLASTY  2009    Right       Family History:  Family History   Problem Relation Age of Onset    Cancer Mother      throat cancer    Cancer Father      kidney cancer    Hypertension Sister        Social History:  Social History   Substance Use Topics    Smoking status: Former Smoker     Packs/day: 1.00     Years: 30.00     Quit date: 5/23/2004    Smokeless tobacco: Never Used    Alcohol use No      Comment: no alcohol for the pask 6 months       Allergies:  No Known Allergies      Review of Systems   Review of Systems   Constitutional: Positive for unexpected weight change. Negative for activity change, appetite change, chills and fever. HENT: Positive for trouble swallowing. Negative for congestion, sore throat and voice change. Eyes: Negative for visual disturbance. Respiratory: Positive for shortness of breath. Negative for cough. Cardiovascular: Negative for chest pain and leg swelling.    Gastrointestinal: Positive for vomiting. Negative for abdominal pain, diarrhea and nausea. Genitourinary: Negative for dysuria, hematuria and urgency. Musculoskeletal: Negative for arthralgias and joint swelling. Skin: Negative for rash. Neurological: Negative for dizziness, syncope and numbness. Physical Exam   Physical Exam   Constitutional: He is oriented to person, place, and time. He appears well-developed and well-nourished. HENT:   Head: Atraumatic. Mouth/Throat: No oropharyngeal exudate. Eyes: Pupils are equal, round, and reactive to light. No scleral icterus. Neck: Neck supple. No thyromegaly present. Cardiovascular:   Irregularly irregular. Good radial pulses. Device palpable in left chest wall   Pulmonary/Chest: Breath sounds normal. No respiratory distress. He has no wheezes. He has no rales. Abdominal: Soft. Bowel sounds are normal. He exhibits no distension. There is no tenderness. There is no rebound. Musculoskeletal: He exhibits no edema. Neurological: He is alert and oriented to person, place, and time. No cranial nerve deficit. Skin: Skin is warm and dry. No rash noted.          Diagnostic Study Results     Labs -     Recent Results (from the past 12 hour(s))   EKG, 12 LEAD, INITIAL    Collection Time: 01/12/18 11:06 AM   Result Value Ref Range    Ventricular Rate 108 BPM    Atrial Rate 119 BPM    QRS Duration 154 ms    Q-T Interval 480 ms    QTC Calculation (Bezet) 643 ms    Calculated P Axis 87 degrees    Calculated R Axis -78 degrees    Calculated T Axis 89 degrees    Diagnosis       Ventricular-paced rhythm  Biventricular pacemaker detected  Abnormal ECG  When compared with ECG of 14-JUL-2014 09:10,  Electronic ventricular pacemaker has replaced Atrial fibrillation     CBC WITH AUTOMATED DIFF    Collection Time: 01/12/18 11:17 AM   Result Value Ref Range    WBC 8.4 4.1 - 11.1 K/uL    RBC 4.65 4.10 - 5.70 M/uL    HGB 13.0 12.1 - 17.0 g/dL    HCT 39.6 36.6 - 50.3 %    MCV 85.2 80.0 - 99.0 FL MCH 28.0 26.0 - 34.0 PG    MCHC 32.8 30.0 - 36.5 g/dL    RDW 15.5 (H) 11.5 - 14.5 %    PLATELET 776 728 - 573 K/uL    NEUTROPHILS 79 (H) 32 - 75 %    LYMPHOCYTES 10 (L) 12 - 49 %    MONOCYTES 10 5 - 13 %    EOSINOPHILS 1 0 - 7 %    BASOPHILS 0 0 - 1 %    ABS. NEUTROPHILS 6.7 1.8 - 8.0 K/UL    ABS. LYMPHOCYTES 0.8 0.8 - 3.5 K/UL    ABS. MONOCYTES 0.8 0.0 - 1.0 K/UL    ABS. EOSINOPHILS 0.0 0.0 - 0.4 K/UL    ABS. BASOPHILS 0.0 0.0 - 0.1 K/UL   METABOLIC PANEL, COMPREHENSIVE    Collection Time: 01/12/18 11:17 AM   Result Value Ref Range    Sodium 143 136 - 145 mmol/L    Potassium 4.6 3.5 - 5.1 mmol/L    Chloride 108 97 - 108 mmol/L    CO2 27 21 - 32 mmol/L    Anion gap 8 5 - 15 mmol/L    Glucose 95 65 - 100 mg/dL    BUN 26 (H) 6 - 20 MG/DL    Creatinine 1.12 0.70 - 1.30 MG/DL    BUN/Creatinine ratio 23 (H) 12 - 20      GFR est AA >60 >60 ml/min/1.73m2    GFR est non-AA >60 >60 ml/min/1.73m2    Calcium 9.3 8.5 - 10.1 MG/DL    Bilirubin, total 1.0 0.2 - 1.0 MG/DL    ALT (SGPT) 230 (H) 12 - 78 U/L    AST (SGOT) 240 (H) 15 - 37 U/L    Alk.  phosphatase 149 (H) 45 - 117 U/L    Protein, total 6.3 (L) 6.4 - 8.2 g/dL    Albumin 2.5 (L) 3.5 - 5.0 g/dL    Globulin 3.8 2.0 - 4.0 g/dL    A-G Ratio 0.7 (L) 1.1 - 2.2     CK W/ REFLX CKMB    Collection Time: 01/12/18 11:17 AM   Result Value Ref Range    CK 39 39 - 308 U/L   TROPONIN I    Collection Time: 01/12/18 11:17 AM   Result Value Ref Range    Troponin-I, Qt. <0.04 <0.05 ng/mL   PROTHROMBIN TIME + INR    Collection Time: 01/12/18 11:17 AM   Result Value Ref Range    INR 1.3 (H) 0.9 - 1.1      Prothrombin time 13.3 (H) 9.0 - 11.1 sec   PTT    Collection Time: 01/12/18 11:17 AM   Result Value Ref Range    aPTT 28.4 22.1 - 32.5 sec    aPTT, therapeutic range     58.0 - 77.0 SECS   MAGNESIUM    Collection Time: 01/12/18 11:17 AM   Result Value Ref Range    Magnesium 1.9 1.6 - 2.4 mg/dL       Radiologic Studies -   XR CHEST PA LAT   Final Result        CT Results  (Last 48 hours) None        CXR Results  (Last 48 hours)               01/12/18 1139  XR CHEST PA LAT Final result    Impression:  Impression: No acute process or change compared to the prior exam.           Narrative:  Exam:  2 view chest       Indication: Irregular heartbeat, atrial fibrillation, pacemaker       Comparison to 1/13/2015. PA and lateral views demonstrate normal heart size. Pacer leads are unchanged in   position. The lungs are hyperinflated. There is no acute process in the lung   fields. Degenerative changes are seen in the thoracic spine. Medical Decision Making   I am the first provider for this patient. I reviewed the vital signs, available nursing notes, past medical history, past surgical history, family history and social history. Vital Signs-Reviewed the patient's vital signs. Patient Vitals for the past 12 hrs:   Pulse Resp BP SpO2   01/12/18 1145 (!) 108 - 139/80 -   01/12/18 1115 (!) 109 17 146/76 97 %   01/12/18 1100 (!) 110 19 143/77 -       Pulse Oximetry Analysis - 97% on RA    Cardiac Monitor:   Rate: 102 bpm  Rhythm: Atrial Fibrillation with RVR     EKG interpretation: (Preliminary)  Rhythm: atrial fib; and irregular. Rate (approx.): 110; Axis: left axis deviation; QRS interval: normal ; ST/T wave: non-specific changes. Written by Kaylynn Sanchez MD    Records Reviewed: Nursing Notes, Old Medical Records and Previous electrocardiograms    Provider Notes (Medical Decision Making):   Differential Diagnosis: electrolyte imbalance, malignancy, worsening cardiomyopathy  71 y.o. with hx of atrial fibrillation with recent severe weight loss with recent bowel prep for planned endoscopy today who was found to be in afib with RVR. Symtoms limited to shortness of breath. Physical exam with irregularly irregular rhythm. No lower extremity edema, no crackles in lungs. Will get labs to check electrolytes, cbc, pt/ptt. He has no infectious symptoms at this time.  Will discuss with cardiology who has already seen patient. ED Course:   Initial assessment performed. The patients presenting problems have been discussed, and they are in agreement with the care plan formulated and outlined with them. I have encouraged them to ask questions as they arise throughout their visit. Cardiology note reviewed, they have ordered metoprolol, amiodarone, dilt drip and recommended dosing lovenox off creatinine and weight for anitcoagulation. Patient informed of likely admission and agreeable with plan      Disposition:  12:26 PM  Patient is being admitted to the hospital by Dr. Yovany Cuello. The results of their tests and reasons for their admission have been discussed with them and/or available family. They convey agreement and understanding for the need to be admitted and for their admission diagnosis. Consultation has been made with the inpatient physician specialist for hospitalization. PLAN:  1. Current Discharge Medication List        2. Follow-up Information     None        Return to ED if worse     Diagnosis     Clinical Impression: No diagnosis found.     Attestations:

## 2018-01-12 NOTE — H&P
Hospitalist Admission Note    NAME: Yudith Small   :  [id number]   MRN:  [id number]     Date/Time:  2018 12:42 PM    Patient PCP: Jevon Jaramillo MD  ______________________________________________________________________   Assessment & Plan:  Atrial fib with RVR  --cardiology consult. IV diltiazem drip added, increased oral amiodarone, continue metoprolol  --lovenox since been off xarelto    Weight loss  Intermittent vomiting  --to have egd/colonoscopy once afib controlled. --get CT chest since hx smoking    Acutely elevated LFTs  Hx hepatic steatosis  No abdominal pain  --consult GI, ?due to amiodarone, follow trend    Aspiration with thin liquids on MBS  --nectar thick liquids    Body mass index is 23.21 kg/(m^2). Code: full  DVT prophylaxis: lovenox  Surrogate decision maker:  wife        Subjective:   CHIEF COMPLAINT:  SOB    HISTORY OF PRESENT ILLNESS:     Yudith Small is a 71 y.o.  male with CMO, p. afib who is sent from endoscopy to ER for afib with rvr. Patient due to have egd/colonoscopy today with Dr. Siobhan Arnold to evaluate 40+ lb weight loss in past 5 months with intermittent episodes of vomiting after eating. Had CT abdomen/pelvis that was normal. Also had abd us and modified barium swallow showing aspiration with thin liquids. He does not like to use thickener and stopped it after a few times. After being sent to erroneous locations on campus for endoscopy, he arrived to correct location and told anesthesiologist he was SOB and found to be in afib with RVR HR in 110s. Apparently was SR last week. Been off anticoagulation with xarelto since Tuesday. Evaluated by Dr. Mandeep Bearden and sent to ER for admission. GARCIA and nonproductive cough chronically. Denies CP, dizziness, HA. We were asked to admit for work up and evaluation of the above problems.      Past Medical History:   Diagnosis Date    Arrhythmia     afib    Arthritis     Atrial fibrillation ([address] Dx 5/19/14    Dr Olaf Noble [phone]    Hypertension     Hypertrophy (benign) of prostate     Thromboembolus [facility]) 2003    s/p Lt knee surgery (was on Coumadin x3 yr)      Past Surgical History:   Procedure Laterality Date    HX ORTHOPAEDIC      back surgery no hardware    HX ORTHOPAEDIC  2014    right knee surgery arthroscopy    HX ORTHOPAEDIC  3/23/15    REVISION TO LEFT TOTAL KNEE REPLACEMENT    HX PACEMAKER  8/2014    3 wire    TOTAL KNEE ARTHROPLASTY  2003    Left    TOTAL KNEE ARTHROPLASTY  2009    Right     Social History   Substance Use Topics    Smoking status: Former Smoker     Packs/day: 1.00     Years: 30.00     Quit date: 5/23/2004    Smokeless tobacco: Never Used    Alcohol use No      Comment: no alcohol for the pask 6 months       Family History   Problem Relation Age of Onset    Cancer Mother      throat cancer    Cancer Father      kidney cancer    Hypertension Sister      No Known Allergies     Prior to Admission medications    Medication Sig Start Date End Date Taking? Authorizing Provider   potassium chloride SA (MICRO-K) 10 mEq capsule Take 10 mEq by mouth daily. Historical Provider   rivaroxaban (XARELTO) 20 mg tab tablet Take 20 mg by mouth daily. Historical Provider   famotidine (PEPCID) 20 mg tablet Take 1 Tab by mouth two (2) times a day. 4/30/15   Rosibel Rea MD   losartan-hydrochlorothiazide [facility]) 100-25 mg per tablet Take 1 Tab by mouth daily. DO NOT TAKE FOR BLOOD PRESSURE UNDER 130/80 3/26/15   Emanuel Roa NP   furosemide (LASIX) 40 mg tablet DO NOT TAKE FOR BLOOD PRESSURE UNDER 130/80 3/26/15   Emanuel Roa NP   metoprolol succinate (TOPROL-XL) 50 mg XL tablet Take 50 mg by mouth daily. Historical Provider   amiodarone (PACERONE) 400 mg tablet Take 1 Tab by mouth daily. 7/14/14   Yadiel Shearer MD     REVIEW OF SYSTEMS:  POSITIVE= Bold.   Negative = normal text  General:  fever, chills, sweats, generalized weakness, weight loss/gain, loss of appetite  Eyes:  blurred vision, eye pain, loss of vision, diplopia  Ear Nose and Throat:  rhinorrhea, pharyngitis  Respiratory:   cough, sputum production, SOB, wheezing, GARCIA, pleuritic pain  Cardiology:  chest pain, palpitations, orthopnea, PND, edema, syncope   Gastrointestinal:  abdominal pain, N/intermittent Vomiting, dysphagia, diarrhea, constipation, bleeding  Genitourinary:  frequency, urgency, dysuria, hematuria, incontinence  Muskuloskeletal :  arthralgia, myalgia  Hematology:  easy bruising, bleeding, lymphadenopathy  Dermatological:  rash, ulceration, pruritis  Endocrine:  hot flashes or polydipsia  Neurological:  headache, dizziness, confusion, focal weakness, paresthesia, memory loss, gait disturbance  Psychological: anxiety, depression, agitation      Objective:   VITALS:    Visit Vitals    /80    Pulse (!) 108    Resp 17    Ht 6' 4\" (1.93 m)    Wt 86.5 kg (190 lb 11.2 oz)    SpO2 97%    BMI 23.21 kg/m2     No data recorded. Body mass index is 23.21 kg/(m^2). PHYSICAL EXAM:    General:    Alert, cooperative, no distress, appears stated age. HEENT: Atraumatic, anicteric sclerae, pink conjunctivae     No oral ulcers, mucosa moist, throat clear. Hearing intact. Neck:  Supple, symmetrical,  thyroid: non tender  Lungs:   Clear to auscultation bilaterally. No Wheezing or Rhonchi. No rales. Chest wall:  No tenderness  No Accessory muscle use. Heart:   irregular  rhythm,  No  murmur   No gallop. No edema. Abdomen:   Soft, non-tender. Not distended. Bowel sounds normal. No masses  Extremities: No cyanosis. No clubbing  Skin:     Not pale Not Jaundiced  No rashes   Psych:  Good insight. Not depressed. Not anxious or agitated. Neurologic: EOMs intact. No facial asymmetry. No aphasia or slurred speech. Symmetrical strength, Alert and oriented X 3.      IMAGING RESULTS:   []       I have personally reviewed the actual   []     CXR  []     CT scan  CXR:  CT :  EKG:   ________________________________________________________________________  Care Plan discussed with:    Comments   Patient y    [facility]:      ________________________________________________________________________  Prophylaxis:  GI pepcid   DVT lovenox   ________________________________________________________________________  Recommended Disposition:   Home with Family y   HH/PT/OT/RN    SNF/LTC    TONE    ________________________________________________________________________  Code Status:  Full Code y   DNR/DNI    ________________________________________________________________________  TOTAL TIME:  60 minutes      Comments    y Reviewed previous records   >50% of visit spent in counseling and coordination of care  Discussion with patient and/or family and questions answered         ______________________________________________________________________  Randy Cortes MD      Procedures: see electronic medical records for all procedures/Xrays and details which were not copied into this note but were reviewed prior to creation of Plan.     LAB DATA REVIEWED:    Recent Results (from the past 24 hour(s))   EKG, 12 LEAD, INITIAL    Collection Time: 01/12/18 11:06 AM   Result Value Ref Range    Ventricular Rate 108 BPM    Atrial Rate 119 BPM    QRS Duration 154 ms    Q-T Interval 480 ms    QTC Calculation (Bezet) 643 ms    Calculated P Axis 87 degrees    Calculated R Axis -78 degrees    Calculated T Axis 89 degrees    Diagnosis       Ventricular-paced rhythm  Biventricular pacemaker detected  Abnormal ECG  When compared with ECG of 14-JUL-2014 09:10,  Electronic ventricular pacemaker has replaced Atrial fibrillation     CBC WITH AUTOMATED DIFF    Collection Time: 01/12/18 11:17 AM   Result Value Ref Range    WBC 8.4 4.1 - 11.1 K/uL    RBC 4.65 4.10 - 5.70 M/uL    HGB 13.0 12.1 - 17.0 g/dL    HCT 39.6 36.6 - 50.3 %    MCV 85.2 80.0 - 99.0 FL MCH 28.0 26.0 - 34.0 PG    MCHC 32.8 30.0 - 36.5 g/dL    RDW 15.5 (H) 11.5 - 14.5 %    PLATELET 468 214 - 943 K/uL    NEUTROPHILS 79 (H) 32 - 75 %    LYMPHOCYTES 10 (L) 12 - 49 %    MONOCYTES 10 5 - 13 %    EOSINOPHILS 1 0 - 7 %    BASOPHILS 0 0 - 1 %    ABS. NEUTROPHILS 6.7 1.8 - 8.0 K/UL    ABS. LYMPHOCYTES 0.8 0.8 - 3.5 K/UL    ABS. MONOCYTES 0.8 0.0 - 1.0 K/UL    ABS. EOSINOPHILS 0.0 0.0 - 0.4 K/UL    ABS. BASOPHILS 0.0 0.0 - 0.1 K/UL   METABOLIC PANEL, COMPREHENSIVE    Collection Time: 01/12/18 11:17 AM   Result Value Ref Range    Sodium 143 136 - 145 mmol/L    Potassium 4.6 3.5 - 5.1 mmol/L    Chloride 108 97 - 108 mmol/L    CO2 27 21 - 32 mmol/L    Anion gap 8 5 - 15 mmol/L    Glucose 95 65 - 100 mg/dL    BUN 26 (H) 6 - 20 MG/DL    Creatinine 1.12 0.70 - 1.30 MG/DL    BUN/Creatinine ratio 23 (H) 12 - 20      GFR est AA >60 >60 ml/min/1.73m2    GFR est non-AA >60 >60 ml/min/1.73m2    Calcium 9.3 8.5 - 10.1 MG/DL    Bilirubin, total 1.0 0.2 - 1.0 MG/DL    ALT (SGPT) 230 (H) 12 - 78 U/L    AST (SGOT) 240 (H) 15 - 37 U/L    Alk.  phosphatase 149 (H) 45 - 117 U/L    Protein, total 6.3 (L) 6.4 - 8.2 g/dL    Albumin 2.5 (L) 3.5 - 5.0 g/dL    Globulin 3.8 2.0 - 4.0 g/dL    A-G Ratio 0.7 (L) 1.1 - 2.2     CK W/ REFLX CKMB    Collection Time: 01/12/18 11:17 AM   Result Value Ref Range    CK 39 39 - 308 U/L   TROPONIN I    Collection Time: 01/12/18 11:17 AM   Result Value Ref Range    Troponin-I, Qt. <0.04 <0.05 ng/mL   PROTHROMBIN TIME + INR    Collection Time: 01/12/18 11:17 AM   Result Value Ref Range    INR 1.3 (H) 0.9 - 1.1      Prothrombin time 13.3 (H) 9.0 - 11.1 sec   PTT    Collection Time: 01/12/18 11:17 AM   Result Value Ref Range    aPTT 28.4 22.1 - 32.5 sec    aPTT, therapeutic range     58.0 - 77.0 SECS   MAGNESIUM    Collection Time: 01/12/18 11:17 AM   Result Value Ref Range    Magnesium 1.9 1.6 - 2.4 mg/dL

## 2018-01-12 NOTE — PROGRESS NOTES
Dr. Domingo Neves states he wants patient to be admitted to the ER to get blood work and a chest x ray, etc.    Dr. Domingo Neves has spoken to Dr. Kerby Skiff, Dr. Eduar Leung and patient and wife. Will facilitate patient's tranfer to ER.

## 2018-01-13 LAB
ANION GAP SERPL CALC-SCNC: 5 MMOL/L (ref 5–15)
BUN SERPL-MCNC: 24 MG/DL (ref 6–20)
BUN/CREAT SERPL: 25 (ref 12–20)
CALCIUM SERPL-MCNC: 8.6 MG/DL (ref 8.5–10.1)
CHLORIDE SERPL-SCNC: 108 MMOL/L (ref 97–108)
CO2 SERPL-SCNC: 28 MMOL/L (ref 21–32)
CREAT SERPL-MCNC: 0.97 MG/DL (ref 0.7–1.3)
GLUCOSE SERPL-MCNC: 107 MG/DL (ref 65–100)
POTASSIUM SERPL-SCNC: 4 MMOL/L (ref 3.5–5.1)
SODIUM SERPL-SCNC: 141 MMOL/L (ref 136–145)

## 2018-01-13 PROCEDURE — 36415 COLL VENOUS BLD VENIPUNCTURE: CPT | Performed by: HOSPITALIST

## 2018-01-13 PROCEDURE — 74011250636 HC RX REV CODE- 250/636: Performed by: HOSPITALIST

## 2018-01-13 PROCEDURE — 74011000250 HC RX REV CODE- 250: Performed by: INTERNAL MEDICINE

## 2018-01-13 PROCEDURE — 74011000258 HC RX REV CODE- 258: Performed by: HOSPITALIST

## 2018-01-13 PROCEDURE — 74011250637 HC RX REV CODE- 250/637: Performed by: INTERNAL MEDICINE

## 2018-01-13 PROCEDURE — 74011250637 HC RX REV CODE- 250/637: Performed by: HOSPITALIST

## 2018-01-13 PROCEDURE — 74011000250 HC RX REV CODE- 250: Performed by: HOSPITALIST

## 2018-01-13 PROCEDURE — 74011000258 HC RX REV CODE- 258: Performed by: INTERNAL MEDICINE

## 2018-01-13 PROCEDURE — 65660000000 HC RM CCU STEPDOWN

## 2018-01-13 PROCEDURE — 93005 ELECTROCARDIOGRAM TRACING: CPT

## 2018-01-13 PROCEDURE — 80048 BASIC METABOLIC PNL TOTAL CA: CPT | Performed by: HOSPITALIST

## 2018-01-13 RX ORDER — VALSARTAN 40 MG/1
80 TABLET ORAL DAILY
Status: DISCONTINUED | OUTPATIENT
Start: 2018-01-14 | End: 2018-01-23 | Stop reason: HOSPADM

## 2018-01-13 RX ORDER — METOPROLOL SUCCINATE 50 MG/1
50 TABLET, EXTENDED RELEASE ORAL DAILY
Status: DISCONTINUED | OUTPATIENT
Start: 2018-01-14 | End: 2018-01-23 | Stop reason: HOSPADM

## 2018-01-13 RX ORDER — METOPROLOL SUCCINATE 50 MG/1
25 TABLET, EXTENDED RELEASE ORAL DAILY
Status: DISCONTINUED | OUTPATIENT
Start: 2018-01-14 | End: 2018-01-13

## 2018-01-13 RX ADMIN — Medication 10 ML: at 02:26

## 2018-01-13 RX ADMIN — METOPROLOL SUCCINATE 50 MG: 50 TABLET, EXTENDED RELEASE ORAL at 08:54

## 2018-01-13 RX ADMIN — FAMOTIDINE 20 MG: 20 TABLET, FILM COATED ORAL at 18:41

## 2018-01-13 RX ADMIN — SODIUM CHLORIDE 15 MG/HR: 900 INJECTION, SOLUTION INTRAVENOUS at 04:13

## 2018-01-13 RX ADMIN — AMIODARONE HYDROCHLORIDE 400 MG: 200 TABLET ORAL at 22:08

## 2018-01-13 RX ADMIN — ENOXAPARIN SODIUM 90 MG: 100 INJECTION SUBCUTANEOUS at 15:06

## 2018-01-13 RX ADMIN — AMIODARONE HYDROCHLORIDE 400 MG: 200 TABLET ORAL at 08:54

## 2018-01-13 RX ADMIN — Medication 10 ML: at 08:56

## 2018-01-13 RX ADMIN — SODIUM CHLORIDE 5 MG/HR: 900 INJECTION, SOLUTION INTRAVENOUS at 13:07

## 2018-01-13 RX ADMIN — FAMOTIDINE 20 MG: 20 TABLET, FILM COATED ORAL at 08:54

## 2018-01-13 RX ADMIN — ENOXAPARIN SODIUM 90 MG: 100 INJECTION SUBCUTANEOUS at 02:26

## 2018-01-13 RX ADMIN — AMIODARONE HYDROCHLORIDE 400 MG: 200 TABLET ORAL at 15:04

## 2018-01-13 RX ADMIN — Medication 10 ML: at 22:08

## 2018-01-13 NOTE — ED NOTES
Case D/W hospitalist. MD ordered to change the dilt drip to 5mg/hr due to pt HR staying in 60's and pacer may be set at 60 so it will not go lower. Rate changed at time.     Hosipitalist aware of cardiology and gastroenterology seeing pt

## 2018-01-13 NOTE — PROGRESS NOTES
TRANSFER - IN REPORT:    Verbal report received from nas (name) on Venus Baldwin  being received from ED (unit) for routine progression of care      Report consisted of patients Situation, Background, Assessment and   Recommendations(SBAR). Information from the following report(s) SBAR, Kardex, ED Summary, Procedure Summary, Intake/Output, MAR, Recent Results and Cardiac Rhythm afib was reviewed with the receiving nurse. Opportunity for questions and clarification was provided. Assessment completed upon patients arrival to unit and care assumed.

## 2018-01-13 NOTE — PROGRESS NOTES
Hospitalist Progress Note    NAME: Eliu Arias   :     MRN:  [id number]       Assessment / Plan:  Atrial fib with RVR  cardiology Inputs and recommendations Appreciated. On IV diltiazem drip added, increased oral amiodarone, continue metoprolol  Continue Lovenox since been off xarelto     Weight loss  Intermittent vomiting  GI already on board   Will have egd/colonoscopy once afib controlled. CT chest since hx smoking     Acutely elevated LFTs  Hx hepatic steatosis  No abdominal pain  Follow up LFT   GI on board .     Aspiration with thin liquids on MBS  nectar thick liquids  TO have EGD soon.     Body mass index is 23.21 kg/(m^2). Body mass index is 23.21 kg/(m^2). Code status: Full  Prophylaxis: Lovenox  Recommended Disposition: Home w/Family     Subjective:     Chief Complaint / Reason for Physician Visit   he is feeling fine now. Discussed with RN events overnight. Review of Systems:  Symptom Y/N Comments  Symptom Y/N Comments   Fever/Chills n   Chest Pain n    Poor Appetite n   Edema n    Cough n   Abdominal Pain y    Sputum n   Joint Pain n    SOB/GARCIA n   Pruritis/Rash n    Nausea/vomit n   Tolerating PT/OT     Diarrhea n   Tolerating Diet     Constipation n   Other       Could NOT obtain due to:      Objective:     VITALS:   Last 24hrs VS reviewed since prior progress note.  Most recent are:  Patient Vitals for the past 24 hrs:   Pulse Resp BP SpO2   18 1032 63 26 - 95 %   18 1000 64 21 138/71 96 %   18 0919 66 25 - 96 %   18 0900 72 22 134/57 95 %   18 0815 - - - 96 %   18 0800 65 17 134/70 95 %   18 0732 64 14 141/68 96 %   18 0600 63 20 - 96 %   18 0400 65 28 130/66 94 %   18 0315 83 16 - 96 %   18 0300 - - 125/60 95 %   18 0230 87 15 - 96 %   18 0200 - - 141/88 -   18 0115 - - - 94 %   18 0100 - - 143/79 -   18 0030 89 23 - -   18 94 24 143/88 94 %   18 - - 155/87 95 %   01/12/18 2100 94 13 152/90 97 %   01/12/18 2045 89 25 145/82 96 %   01/12/18 2030 89 23 139/76 97 %   01/12/18 2015 90 29 144/89 96 %   01/12/18 2000 90 25 150/85 96 %   01/12/18 1945 91 26 144/84 95 %   01/12/18 1933 94 - 151/80 -   01/12/18 1930 91 25 151/80 96 %   01/12/18 1800 97 26 - 96 %   01/12/18 1745 100 20 (!) 143/109 98 %   01/12/18 1730 (!) 105 27 165/90 96 %   01/12/18 1715 97 25 158/81 97 %   01/12/18 1700 (!) 101 22 148/80 98 %   01/12/18 1645 99 25 (!) 148/93 98 %   01/12/18 1630 94 19 161/81 98 %   01/12/18 1615 (!) 101 25 (!) 148/95 97 %   01/12/18 1600 (!) 103 25 146/90 98 %   01/12/18 1545 100 20 158/75 99 %   01/12/18 1400 (!) 105 17 (!) 158/93 99 %   01/12/18 1345 (!) 104 24 149/82 97 %   01/12/18 1330 (!) 104 23 144/85 -   01/12/18 1315 (!) 102 19 146/80 -   01/12/18 1300 (!) 102 15 (!) 153/98 98 %   01/12/18 1245 (!) 108 23 (!) 161/95 -   01/12/18 1230 (!) 110 23 (!) 158/94 -   01/12/18 1200 (!) 110 19 - 99 %   01/12/18 1145 (!) 108 - 139/80 -     No intake or output data in the 24 hours ending 01/13/18 1120     PHYSICAL EXAM:  General: WD, WN. Alert, cooperative, no acute distress    EENT:  EOMI. Anicteric sclerae. MMM  Resp:  CTA bilaterally, no wheezing or rales. No accessory muscle use  CV:  Regular  rhythm,  No edema  GI:  Soft, Non distended, Non tender.  +Bowel sounds  Neurologic:  Alert and oriented X 3, normal speech,   Psych:   Good insight. Not anxious nor agitated  Skin:  No rashes.   No jaundice    Reviewed most current lab test results and cultures  YES  Reviewed most current radiology test results   YES  Review and summation of old records today    NO  Reviewed patient's current orders and MAR    YES  PMH/ reviewed - no change compared to H&P  ________________________________________________________________________  Care Plan discussed with:    Comments   Patient y    Family  y    RN y    Care Manager     Consultant                        Multidiciplinary team rounds were held today with , nursing, pharmacist and clinical coordinator. Patient's plan of care was discussed; medications were reviewed and discharge planning was addressed. ________________________________________________________________________  Total NON critical care TIME:  60   Minutes    Total CRITICAL CARE TIME Spent:   Minutes non procedure based      Comments   >50% of visit spent in counseling and coordination of care     ________________________________________________________________________  Jacqulynn Boeck, MD     Procedures: see electronic medical records for all procedures/Xrays and details which were not copied into this note but were reviewed prior to creation of Plan. LABS:  I reviewed today's most current labs and imaging studies. Pertinent labs include:  Recent Labs      01/12/18   1117   WBC  8.4   HGB  13.0   HCT  39.6   PLT  229     Recent Labs      01/13/18   0415  01/12/18   1117   NA  141  143   K  4.0  4.6   CL  108  108   CO2  28  27   GLU  107*  95   BUN  24*  26*   CREA  0.97  1.12   CA  8.6  9.3   MG   --   1.9   ALB   --   2.5*   TBILI   --   1.0   SGOT   --   240*   ALT   --   230*   INR   --   1.3*       Signed:  Jacqulynn Boeck, MD

## 2018-01-13 NOTE — ED NOTES
Disconnected pt from monitor for pt use the restroom. Pt advised to call the nurse when completed to re-connect to monitor. Pt had watery BM.  Has x 1 week

## 2018-01-13 NOTE — PROGRESS NOTES
Bedside and Verbal shift change report given to Aurora (oncoming nurse) by naya (offgoing nurse). Report included the following information SBAR, Kardex, ED Summary, Procedure Summary, Intake/Output, MAR, Recent Results and Cardiac Rhythm nsr. SHIFT SUMMARY:  Uneventful shift.

## 2018-01-13 NOTE — ED NOTES
Assumed care of pt from Jovanni Gamez.MANUEL. Pt resting quietly and in no acute distress at this time with no complaints. VSS. Call bell within reach.

## 2018-01-13 NOTE — ED NOTES
Pt resting quietly and in no acute distress at this time with no complaints. VSS. Pt on monitor x3. Call bell within reach.

## 2018-01-13 NOTE — ED NOTES
Bedside shift change report given to [address]  (oncoming nurse) by Lucia Hdz RN (offgoing nurse). Report included the following information SBAR, Kardex, ED Summary and MAR. Pt resting in bed. No complaints of pain or SOB. No diaphoresis or N/V. Dilt at 15mg infusing continuously into (L) AC. Ice chips provided to keep pt mouth wet. Educated on not drinking when melted due to aspiration precautions. Pt understands.

## 2018-01-13 NOTE — ED NOTES
Bedside shift change report given to [address] [place] [address] Drive., RN (oncoming nurse) by Radha Allison RN (offgoing nurse). Report included the following information SBAR, Kardex, ED Summary, Intake/Output, MAR, Recent Results and Cardiac Rhythm paced.

## 2018-01-13 NOTE — PROGRESS NOTES
Primary Nurse Shiv Coker RN and MANUEL sparrow performed a dual skin assessment on this patient No impairment noted    Healed scarring on bilateral knees    Tr score is 19

## 2018-01-14 LAB
ALBUMIN SERPL-MCNC: 2.3 G/DL (ref 3.5–5)
ALBUMIN/GLOB SERPL: 0.7 {RATIO} (ref 1.1–2.2)
ALP SERPL-CCNC: 131 U/L (ref 45–117)
ALT SERPL-CCNC: 183 U/L (ref 12–78)
ANION GAP SERPL CALC-SCNC: 6 MMOL/L (ref 5–15)
AST SERPL-CCNC: 190 U/L (ref 15–37)
ATRIAL RATE: 67 BPM
BASOPHILS # BLD: 0 K/UL (ref 0–0.1)
BASOPHILS NFR BLD: 0 % (ref 0–1)
BILIRUB SERPL-MCNC: 1.2 MG/DL (ref 0.2–1)
BUN SERPL-MCNC: 18 MG/DL (ref 6–20)
BUN/CREAT SERPL: 23 (ref 12–20)
CALCIUM SERPL-MCNC: 9 MG/DL (ref 8.5–10.1)
CALCULATED P AXIS, ECG09: 78 DEGREES
CALCULATED R AXIS, ECG10: -87 DEGREES
CALCULATED T AXIS, ECG11: 62 DEGREES
CHLORIDE SERPL-SCNC: 110 MMOL/L (ref 97–108)
CO2 SERPL-SCNC: 25 MMOL/L (ref 21–32)
CREAT SERPL-MCNC: 0.8 MG/DL (ref 0.7–1.3)
DIAGNOSIS, 93000: NORMAL
DIFFERENTIAL METHOD BLD: ABNORMAL
EOSINOPHIL # BLD: 0.3 K/UL (ref 0–0.4)
EOSINOPHIL NFR BLD: 5 % (ref 0–7)
ERYTHROCYTE [DISTWIDTH] IN BLOOD BY AUTOMATED COUNT: 15.3 % (ref 11.5–14.5)
GLOBULIN SER CALC-MCNC: 3.4 G/DL (ref 2–4)
GLUCOSE SERPL-MCNC: 95 MG/DL (ref 65–100)
HCT VFR BLD AUTO: 35.5 % (ref 36.6–50.3)
HGB BLD-MCNC: 11.9 G/DL (ref 12.1–17)
LYMPHOCYTES # BLD: 0.7 K/UL (ref 0.8–3.5)
LYMPHOCYTES NFR BLD: 13 % (ref 12–49)
MCH RBC QN AUTO: 29 PG (ref 26–34)
MCHC RBC AUTO-ENTMCNC: 33.5 G/DL (ref 30–36.5)
MCV RBC AUTO: 86.6 FL (ref 80–99)
MONOCYTES # BLD: 0.3 K/UL (ref 0–1)
MONOCYTES NFR BLD: 6 % (ref 5–13)
NEUTS SEG # BLD: 4.2 K/UL (ref 1.8–8)
NEUTS SEG NFR BLD: 76 % (ref 32–75)
P-R INTERVAL, ECG05: 150 MS
PLATELET # BLD AUTO: 207 K/UL (ref 150–400)
POTASSIUM SERPL-SCNC: 4 MMOL/L (ref 3.5–5.1)
PROT SERPL-MCNC: 5.7 G/DL (ref 6.4–8.2)
Q-T INTERVAL, ECG07: 516 MS
QRS DURATION, ECG06: 192 MS
QTC CALCULATION (BEZET), ECG08: 545 MS
RBC # BLD AUTO: 4.1 M/UL (ref 4.1–5.7)
RBC MORPH BLD: ABNORMAL
SODIUM SERPL-SCNC: 141 MMOL/L (ref 136–145)
VENTRICULAR RATE, ECG03: 67 BPM
WBC # BLD AUTO: 5.5 K/UL (ref 4.1–11.1)

## 2018-01-14 PROCEDURE — 74011250637 HC RX REV CODE- 250/637: Performed by: INTERNAL MEDICINE

## 2018-01-14 PROCEDURE — 74011250637 HC RX REV CODE- 250/637: Performed by: HOSPITALIST

## 2018-01-14 PROCEDURE — 80053 COMPREHEN METABOLIC PANEL: CPT | Performed by: INTERNAL MEDICINE

## 2018-01-14 PROCEDURE — 74011250636 HC RX REV CODE- 250/636: Performed by: HOSPITALIST

## 2018-01-14 PROCEDURE — 36415 COLL VENOUS BLD VENIPUNCTURE: CPT | Performed by: INTERNAL MEDICINE

## 2018-01-14 PROCEDURE — 85025 COMPLETE CBC W/AUTO DIFF WBC: CPT | Performed by: INTERNAL MEDICINE

## 2018-01-14 PROCEDURE — 65660000000 HC RM CCU STEPDOWN

## 2018-01-14 PROCEDURE — 74011000250 HC RX REV CODE- 250: Performed by: INTERNAL MEDICINE

## 2018-01-14 RX ORDER — POLYETHYLENE GLYCOL 3350, SODIUM SULFATE ANHYDROUS, SODIUM BICARBONATE, SODIUM CHLORIDE, POTASSIUM CHLORIDE 236; 22.74; 6.74; 5.86; 2.97 G/4L; G/4L; G/4L; G/4L; G/4L
2000 POWDER, FOR SOLUTION ORAL ONCE
Status: COMPLETED | OUTPATIENT
Start: 2018-01-14 | End: 2018-01-14

## 2018-01-14 RX ORDER — POLYETHYLENE GLYCOL 3350, SODIUM SULFATE ANHYDROUS, SODIUM BICARBONATE, SODIUM CHLORIDE, POTASSIUM CHLORIDE 236; 22.74; 6.74; 5.86; 2.97 G/4L; G/4L; G/4L; G/4L; G/4L
2000 POWDER, FOR SOLUTION ORAL ONCE
Status: DISPENSED | OUTPATIENT
Start: 2018-01-15 | End: 2018-01-15

## 2018-01-14 RX ADMIN — Medication 10 ML: at 13:50

## 2018-01-14 RX ADMIN — FAMOTIDINE 20 MG: 20 TABLET, FILM COATED ORAL at 08:58

## 2018-01-14 RX ADMIN — AMIODARONE HYDROCHLORIDE 400 MG: 200 TABLET ORAL at 21:39

## 2018-01-14 RX ADMIN — VALSARTAN 80 MG: 40 TABLET, FILM COATED ORAL at 08:58

## 2018-01-14 RX ADMIN — AMIODARONE HYDROCHLORIDE 400 MG: 200 TABLET ORAL at 08:59

## 2018-01-14 RX ADMIN — POLYETHYLENE GLYCOL 3350, SODIUM SULFATE ANHYDROUS, SODIUM BICARBONATE, SODIUM CHLORIDE, POTASSIUM CHLORIDE 2000 ML: 236; 22.74; 6.74; 5.86; 2.97 POWDER, FOR SOLUTION ORAL at 18:37

## 2018-01-14 RX ADMIN — AMIODARONE HYDROCHLORIDE 400 MG: 200 TABLET ORAL at 18:36

## 2018-01-14 RX ADMIN — Medication 10 ML: at 04:23

## 2018-01-14 RX ADMIN — FAMOTIDINE 20 MG: 20 TABLET, FILM COATED ORAL at 18:37

## 2018-01-14 RX ADMIN — ENOXAPARIN SODIUM 90 MG: 100 INJECTION SUBCUTANEOUS at 02:22

## 2018-01-14 RX ADMIN — ENOXAPARIN SODIUM 90 MG: 100 INJECTION SUBCUTANEOUS at 13:48

## 2018-01-14 RX ADMIN — METOPROLOL SUCCINATE 50 MG: 50 TABLET, EXTENDED RELEASE ORAL at 08:58

## 2018-01-14 NOTE — PROGRESS NOTES
Progress Note      1/14/2018 7:18 PM  NAME: Anita Boogie   MRN:  [id number]   Admit Diagnosis: Atrial fibrillation with RVR ([facility] [address]     Assessment:     1. Weight loss, difficulty swallowing, CT of abd/pelvis unrevealing, after bowel prep found to be in Afib with RVR  2. Stress 2014 with no ischemia  3. Hx of cardiomyopathy with EF 30% s/p BiV ICD, last EF normalized  4. Hx of PAF s/p cardioversion had been in sinus on xarelto and amiodarone  5. HTN  6. CKD  7. S/p bilteral Knee replacement, hx of prosthesis infection    Severe weight loss, >40lbs, difficulty swallowing, nausea, undergoing GI evaluation. Here to have EGD and Colonoscopy. Found to be in afib with RVR  with dyspnea. Procedures canceled and pt admitted for further management. 6. 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 Hared to tell . In any case the rate is controlled. OK to proceed with studies tomorrow. Plan:     Continue on meds. EKG in AM        [x]        High complexity decision making was performed    Subjective:     Anita Boogie denies chest pain, dyspnea. Discussed with RN events overnight.      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 Most recent are:    Visit Vitals    /80 (BP 1 Location: Right arm, BP Patient Position: Supine)    Pulse 89    Temp 98.3 °F (36.8 °C)    Resp 20    Ht 6' 4\" (1.93 m)    Wt 86.5 kg (190 lb 11.2 oz)    SpO2 97%    BMI 23.21 kg/m2     No intake or output data in the 24 hours ending 01/14/18 1644     General Appearance: Well developed, well nourished, alert & oriented x 3,    no acute distress. Ears/Nose/Mouth/Throat: Hearing grossly normal.  Neck: Supple. Chest: Lungs clear to auscultation bilaterally. Cardiovascular: Regular rate and rhythm, S1S2 normal, no murmur. Abdomen: Soft, non-tender, bowel sounds are active. Extremities: No edema bilaterally. Skin: Warm and dry. PMH/SH reviewed - no change compared to H&P    Data Review    Telemetry: normal sinus rhythm     Lab Data Personally Reviewed:    Recent Labs      01/14/18   0418  01/12/18   1117   WBC  5.5  8.4   HGB  11.9*  13.0   HCT  35.5*  39.6   PLT  207  229   LABRCNT(INR:3,PTP:3,APTT:3,)  Recent Labs      01/14/18   0418  01/13/18   0415  01/12/18   1117   NA  141  141  143   K  4.0  4.0  4.6   CL  110*  108  108   CO2  25  28  27   BUN  18  24*  26*   CREA  0.80  0.97  1.12   GLU  95  107*  95   CA  9.0  8.6  9.3   MG   --    --   1.9   LABRCNT(CPK:3,CpKMB:3,ckndx:3,troiq:3)No results found for: CHOL, CHOLX, CHLST, CHOLV, HDL, LDL, LDLC, DLDLP, TGLX, TRIGL, TRIGP, CHHD, CHHDXLABRCNT(sgot:3,gpt:3,ap:3,tbiL:3,TP:3,ALB:3,GLOB:3,ggt:3,aml:3,amyp:3,lpse:3,hlpse:3)No results for input(s): PH, PCO2, PO2 in the last 72 hours. No results found for: CHOL, CHOLX, CHLST, CHOLV, HDL, LDL, LDLC, DLDLP, TGLX, TRIGL, TRIGP, CHHD, CHHDXMEDTABLETimothy Sabas Davies MD  No results for input(s): PH, PCO2, PO2 in the last 72 hours.     Medications Personally Reviewed:    Current Facility-Administered Medications   Medication Dose Route Frequency    PEG 3350-Electrolytes (GO-LYTELY) SUSPENSION 2,000 mL  2,000 mL Oral ONCE    [START ON 1/15/2018] PEG 3350-Electrolytes (GO-LYTELY) SUSPENSION 2,000 mL  2,000 mL Oral ONCE    metoprolol succinate (TOPROL-XL) XL tablet 50 mg  50 mg Oral DAILY    valsartan (DIOVAN) tablet 80 mg  80 mg Oral DAILY    amiodarone (CORDARONE) tablet 400 mg  400 mg Oral TID    sodium chloride (NS) flush 5-10 mL  5-10 mL IntraVENous Q8H    sodium chloride (NS) flush 5-10 mL  5-10 mL IntraVENous PRN    acetaminophen (TYLENOL) tablet 650 mg  650 mg Oral Q6H PRN    ondansetron (ZOFRAN) injection 4 mg  4 mg IntraVENous Q6H PRN    enoxaparin (LOVENOX) injection 90 mg  1 mg/kg SubCUTAneous Q12H    famotidine (PEPCID) tablet 20 mg  20 mg Oral BID         Keri Ocasio MD

## 2018-01-14 NOTE — PROGRESS NOTES
Problem: Falls - Risk of  Goal: *Absence of Falls  Document Darius Fall Risk and appropriate interventions in the flowsheet.    Outcome: Progressing Towards Goal  Fall Risk Interventions:                 Elimination Interventions: Call light in reach, Patient to call for help with toileting needs, Toileting schedule/hourly rounds, Urinal in reach    History of Falls Interventions: Consult care management for discharge planning, Door open when patient unattended, Evaluate medications/consider consulting pharmacy, Investigate reason for fall, Room close to nurse's station

## 2018-01-14 NOTE — PROGRESS NOTES
Progress Note      1/13/2018 7:18 PM  NAME: Anita Boogie   MRN:  [id number]   Admit Diagnosis: Atrial fibrillation with RVR ([address]     Assessment:     1. Weight loss, difficulty swallowing, CT of abd/pelvis unrevealing, after bowel prep found to be in Afib with RVR  2. Stress 2014 with no ischemia  3. Hx of cardiomyopathy with EF 30% s/p BiV ICD, last EF normalized  4. Hx of PAF s/p cardioversion had been in sinus on xarelto and amiodarone  5. HTN  6. CKD  7. S/p bilteral Knee replacement, hx of prosthesis infection    Severe weight loss, >40lbs, difficulty swallowing, nausea, undergoing GI evaluation. Here to have EGD and Colonoscopy. Found to be in afib with RVR  with dyspnea. Procedures canceled and pt admitted for further management. 6. 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 [x]        High complexity decision making was performed    Subjective:     Anita Boogie denies chest pain, dyspnea. Discussed with RN events overnight. 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  Most recent are:    Visit Vitals    /70 (BP 1 Location: Right arm, BP Patient Position: At rest)    Pulse 65    Temp 98 °F (36.7 °C)    Resp 18    Ht 6' 4\" (1.93 m)    Wt 86.5 kg (190 lb 11.2 oz)    SpO2 97%    BMI 23.21 kg/m2     No intake or output data in the 24 hours ending 01/13/18 1918     General Appearance: Well developed, well nourished, alert & oriented x 3,    no acute distress. Ears/Nose/Mouth/Throat: Hearing grossly normal.  Neck: Supple. Chest: Lungs clear to auscultation bilaterally. Cardiovascular: Regular rate and rhythm, S1S2 normal, no murmur. Abdomen: Soft, non-tender, bowel sounds are active. Extremities: No edema bilaterally. Skin: Warm and dry. PMH/SH reviewed - no change compared to H&P    Data Review    Telemetry: normal sinus rhythm     Lab Data Personally Reviewed:    Recent Labs      01/12/18   1117   WBC  8.4   HGB  13.0   HCT  39.6   PLT  229   LABRCNT(INR:3,PTP:3,APTT:3,)  Recent Labs      01/13/18   0415  01/12/18   1117   NA  141  143   K  4.0  4.6   CL  108  108   CO2  28  27   BUN  24*  26*   CREA  0.97  1.12   GLU  107*  95   CA  8.6  9.3   MG   --   1.9   LABRCNT(CPK:3,CpKMB:3,ckndx:3,troiq:3)No results found for: CHOL, CHOLX, CHLST, CHOLV, HDL, LDL, LDLC, DLDLP, TGLX, TRIGL, TRIGP, CHHD, CHHDXLABRCNT(sgot:3,gpt:3,ap:3,tbiL:3,TP:3,ALB:3,GLOB:3,ggt:3,aml:3,amyp:3,lpse:3,hlpse:3)No results for input(s): PH, PCO2, PO2 in the last 72 hours. No results found for: CHOL, CHOLX, CHLST, CHOLV, HDL, LDL, LDLC, DLDLP, TGLX, TRIGL, TRIGP, CHHD, CHHDXMEDTABLETimmali Polanco MD  No results for input(s): PH, PCO2, PO2 in the last 72 hours.     Medications Personally Reviewed:    Current Facility-Administered Medications   Medication Dose Route Frequency    dilTIAZem (CARDIZEM) 100 mg in 0.9% sodium chloride (MBP/ADV) 100 mL infusion  5 mg/hr IntraVENous TITRATE    [START ON 1/14/2018] metoprolol succinate (TOPROL-XL) XL tablet 50 mg  50 mg Oral DAILY    amiodarone (CORDARONE) tablet 400 mg  400 mg Oral TID    sodium chloride (NS) flush 5-10 mL  5-10 mL IntraVENous Q8H    sodium chloride (NS) flush 5-10 mL  5-10 mL IntraVENous PRN    acetaminophen (TYLENOL) tablet 650 mg  650 mg Oral Q6H PRN    ondansetron (ZOFRAN) injection 4 mg  4 mg IntraVENous Q6H PRN    enoxaparin (LOVENOX) injection 90 mg  1 mg/kg SubCUTAneous Q12H    famotidine (PEPCID) tablet 20 mg  20 mg Oral BID         Julio Cesar Serna MD

## 2018-01-14 NOTE — PROGRESS NOTES
Hospitalist Progress Note    NAME: Michelle Rob   :  [birth year]   MRN:  [id number]       Assessment / Plan:  Atrial fib with RVR  cardiology Inputs and recommendations Appreciated. Hx of cardiomyopathy with EF 30% s/p BiV ICD, last EF normalized  Hx of PAF s/p cardioversion had been in sinus on xarelto and amiodarone ,HTN and CKD. On IV diltiazem drip added, increased oral amiodarone, continue metoprolol  Continue Lovenox since been off xarelto      Weight loss  Intermittent vomiting  GI already on board   Will have egd/colonoscopy once afib controlled. will follow up with GI plans   CT chest  Centrilobular emphysema, 4.6 cm fusiform ascending aortic aneurysm,  interstitial septal thickening which may reflect mild edema or chronic  interstitial disease.      Acutely elevated LFTs  Hx hepatic steatosis  No abdominal pain  Follow up LFT   GI on board .     Aspiration with thin liquids on MBS  nectar thick liquids  TO have EGD soon.     Body mass index is 23.21 kg/(m^2). Body mass index is 23.21 kg/(m^2). Code status: Full  Prophylaxis: Lovenox  Recommended Disposition: Home w/Family     Subjective:     Chief Complaint / Reason for Physician Visit   he is feeling fine now. Discussed with RN events overnight. Review of Systems:  Symptom Y/N Comments  Symptom Y/N Comments   Fever/Chills n   Chest Pain n    Poor Appetite n   Edema n    Cough n   Abdominal Pain y    Sputum n   Joint Pain n    SOB/GARCIA n   Pruritis/Rash n    Nausea/vomit n   Tolerating PT/OT     Diarrhea n   Tolerating Diet     Constipation n   Other       Could NOT obtain due to:      Objective:     VITALS:   Last 24hrs VS reviewed since prior progress note.  Most recent are:  Patient Vitals for the past 24 hrs:   Temp Pulse Resp BP SpO2   18 0736 98.5 °F (36.9 °C) 66 20 153/89 95 %   18 0415 98.2 °F (36.8 °C) 87 20 146/86 97 %   18 2205 98.7 °F (37.1 °C) 66 18 150/75 98 %   18 - - - - 100 %   18 1955 98.9 °F (37.2 °C) 62 20 145/70 99 %   01/13/18 1530 98 °F (36.7 °C) 65 18 148/70 97 %   01/13/18 1504 - 65 - - -   01/13/18 1457 - 66 28 - 96 %   01/13/18 1427 - 64 15 - 98 %   01/13/18 1412 - 63 16 - 99 %   01/13/18 1346 - 65 15 - 98 %   01/13/18 1300 - 64 24 151/75 96 %   01/13/18 1246 - 67 24 - 97 %     No intake or output data in the 24 hours ending 01/14/18 1032     PHYSICAL EXAM:  General: WD, WN. Alert, cooperative, no acute distress    EENT:  EOMI. Anicteric sclerae. MMM  Resp:  CTA bilaterally, no wheezing or rales. No accessory muscle use  CV:  Regular  rhythm,  No edema  GI:  Soft, Non distended, Non tender.  +Bowel sounds  Neurologic:  Alert and oriented X 3, normal speech,   Psych:   Good insight. Not anxious nor agitated  Skin:  No rashes. No jaundice    Reviewed most current lab test results and cultures  YES  Reviewed most current radiology test results   YES  Review and summation of old records today    NO  Reviewed patient's current orders and MAR    YES  PMH/SH reviewed - no change compared to H&P  ________________________________________________________________________  Care Plan discussed with:    Comments   Patient y    Family  y    RN y    Care Manager     Consultant                        Multidiciplinary team rounds were held today with , nursing, pharmacist and clinical coordinator. Patient's plan of care was discussed; medications were reviewed and discharge planning was addressed.      ________________________________________________________________________  Total NON critical care TIME:  60   Minutes    Total CRITICAL CARE TIME Spent:   Minutes non procedure based      Comments   >50% of visit spent in counseling and coordination of care     ________________________________________________________________________  Watson Rasmussen MD     Procedures: see electronic medical records for all procedures/Xrays and details which were not copied into this note but were reviewed prior to creation of Plan. LABS:  I reviewed today's most current labs and imaging studies. Pertinent labs include:  Recent Labs      01/14/18 0418  01/12/18   1117   WBC  5.5  8.4   HGB  11.9*  13.0   HCT  35.5*  39.6   PLT  207  229     Recent Labs      01/14/18 0418 01/13/18   0415 01/12/18   1117   NA  141  141  143   K  4.0  4.0  4.6   CL  110*  108  108   CO2  25  28  27   GLU  95  107*  95   BUN  18  24*  26*   CREA  0.80  0.97  1.12   CA  9.0  8.6  9.3   MG   --    --   1.9   ALB  2.3*   --   2.5*   TBILI  1.2*   --   1.0   SGOT  190*   --   240*   ALT  183*   --   230*   INR   --    --   1.3*       Signed:  Yanet Guajardo MD

## 2018-01-14 NOTE — PROGRESS NOTES
General Daily Progress Note    Admit Date: 1/12/2018  Hospital day 1/14/2018    Subjective:wt loss, dysphagia     Y  N  [] [x]  Abd Pain:    [] [x]  Nausea:  [] [x] Vomiting:  [] []  Diarrhea:  [] []  Constipation:  [] []  Melena:  [] []  Hematochezia:  [] []  Tolerating Diet:    Current Facility-Administered Medications   Medication Dose Route Frequency    PEG 3350-Electrolytes (GO-LYTELY) SUSPENSION 2,000 mL  2,000 mL Oral ONCE    [START ON 1/15/2018] PEG 3350-Electrolytes (GO-LYTELY) SUSPENSION 2,000 mL  2,000 mL Oral ONCE    metoprolol succinate (TOPROL-XL) XL tablet 50 mg  50 mg Oral DAILY    valsartan (DIOVAN) tablet 80 mg  80 mg Oral DAILY    amiodarone (CORDARONE) tablet 400 mg  400 mg Oral TID    sodium chloride (NS) flush 5-10 mL  5-10 mL IntraVENous Q8H    sodium chloride (NS) flush 5-10 mL  5-10 mL IntraVENous PRN    acetaminophen (TYLENOL) tablet 650 mg  650 mg Oral Q6H PRN    ondansetron (ZOFRAN) injection 4 mg  4 mg IntraVENous Q6H PRN    enoxaparin (LOVENOX) injection 90 mg  1 mg/kg SubCUTAneous Q12H    famotidine (PEPCID) tablet 20 mg  20 mg Oral BID        Review of Systems  Pertinent items are noted in HPI. Objective:     Patient Vitals for the past 8 hrs:   BP Temp Pulse Resp SpO2   01/14/18 1127 135/87 97.3 °F (36.3 °C) 94 20 99 %   01/14/18 0736 153/89 98.5 °F (36.9 °C) 66 20 95 %             Physical Exam:   Visit Vitals    /87 (BP 1 Location: Right arm, BP Patient Position: Supine)    Pulse 94    Temp 97.3 °F (36.3 °C)    Resp 20    Ht 6' 4\" (1.93 m)    Wt 86.5 kg (190 lb 11.2 oz)    SpO2 99%    BMI 23.21 kg/m2     General appearance: alert, cooperative, no distress, appears stated age  Abdomen: soft, non-tender.  Bowel sounds normal. No masses,  no organomegaly      Data Review   Recent Results (from the past 24 hour(s))   EKG, 12 LEAD, INITIAL    Collection Time: 01/13/18  4:32 PM   Result Value Ref Range    Ventricular Rate 67 BPM    Atrial Rate 67 BPM    P-R Interval 150 ms    QRS Duration 192 ms    Q-T Interval 516 ms    QTC Calculation (Bezet) 545 ms    Calculated P Axis 78 degrees    Calculated R Axis -87 degrees    Calculated T Axis 62 degrees    Diagnosis       Normal sinus rhythm  Nonspecific intraventricular block  T wave abnormality, consider anterolateral ischemia  When compared with ECG of 12-JAN-2018 11:06,  Sinus rhythm has replaced Electronic ventricular pacemaker  Vent. rate has decreased BY  41 BPM     CBC WITH AUTOMATED DIFF    Collection Time: 01/14/18  4:18 AM   Result Value Ref Range    WBC 5.5 4.1 - 11.1 K/uL    RBC 4.10 4. 10 - 5.70 M/uL    HGB 11.9 (L) 12.1 - 17.0 g/dL    HCT 35.5 (L) 36.6 - 50.3 %    MCV 86.6 80.0 - 99.0 FL    MCH 29.0 26.0 - 34.0 PG    MCHC 33.5 30.0 - 36.5 g/dL    RDW 15.3 (H) 11.5 - 14.5 %    PLATELET 818 406 - 014 K/uL    NEUTROPHILS 76 (H) 32 - 75 %    LYMPHOCYTES 13 12 - 49 %    MONOCYTES 6 5 - 13 %    EOSINOPHILS 5 0 - 7 %    BASOPHILS 0 0 - 1 %    ABS. NEUTROPHILS 4.2 1.8 - 8.0 K/UL    ABS. LYMPHOCYTES 0.7 (L) 0.8 - 3.5 K/UL    ABS. MONOCYTES 0.3 0.0 - 1.0 K/UL    ABS. EOSINOPHILS 0.3 0.0 - 0.4 K/UL    ABS. BASOPHILS 0.0 0.0 - 0.1 K/UL    DF SMEAR SCANNED      RBC COMMENTS NORMOCYTIC, NORMOCHROMIC     METABOLIC PANEL, COMPREHENSIVE    Collection Time: 01/14/18  4:18 AM   Result Value Ref Range    Sodium 141 136 - 145 mmol/L    Potassium 4.0 3.5 - 5.1 mmol/L    Chloride 110 (H) 97 - 108 mmol/L    CO2 25 21 - 32 mmol/L    Anion gap 6 5 - 15 mmol/L    Glucose 95 65 - 100 mg/dL    BUN 18 6 - 20 MG/DL    Creatinine 0.80 0.70 - 1.30 MG/DL    BUN/Creatinine ratio 23 (H) 12 - 20      GFR est AA >60 >60 ml/min/1.73m2    GFR est non-AA >60 >60 ml/min/1.73m2    Calcium 9.0 8.5 - 10.1 MG/DL    Bilirubin, total 1.2 (H) 0.2 - 1.0 MG/DL    ALT (SGPT) 183 (H) 12 - 78 U/L    AST (SGOT) 190 (H) 15 - 37 U/L    Alk.  phosphatase 131 (H) 45 - 117 U/L    Protein, total 5.7 (L) 6.4 - 8.2 g/dL    Albumin 2.3 (L) 3.5 - 5.0 g/dL    Globulin 3.4 2.0 - 4.0 g/dL    A-G Ratio 0.7 (L) 1.1 - 2.2           Assessment:     Principal Problem:    Atrial fibrillation with RVR ([facility]) (1/12/2018)    Active Problems:    Weight loss (1/12/2018)        Plan:     Egd/colon in AM per Dr Evaristo Kohli

## 2018-01-14 NOTE — PROGRESS NOTES
Pt's pacer not capturing, now converted to Afib with a left BBB, notified Dr. Adolph Reynolds, no further orders received.

## 2018-01-15 ENCOUNTER — ANESTHESIA (OUTPATIENT)
Dept: ENDOSCOPY | Age: 73
DRG: 264 | End: 2018-01-15
Payer: MEDICARE

## 2018-01-15 ENCOUNTER — ANESTHESIA EVENT (OUTPATIENT)
Dept: ENDOSCOPY | Age: 73
DRG: 264 | End: 2018-01-15
Payer: MEDICARE

## 2018-01-15 LAB
ATRIAL RATE: 99 BPM
BASOPHILS # BLD: 0 K/UL (ref 0–0.1)
BASOPHILS NFR BLD: 0 % (ref 0–1)
CALCULATED R AXIS, ECG10: -72 DEGREES
CALCULATED T AXIS, ECG11: 85 DEGREES
DIAGNOSIS, 93000: NORMAL
EOSINOPHIL # BLD: 0.1 K/UL (ref 0–0.4)
EOSINOPHIL NFR BLD: 3 % (ref 0–7)
ERYTHROCYTE [DISTWIDTH] IN BLOOD BY AUTOMATED COUNT: 15.5 % (ref 11.5–14.5)
HCT VFR BLD AUTO: 37.7 % (ref 36.6–50.3)
HGB BLD-MCNC: 12.5 G/DL (ref 12.1–17)
LYMPHOCYTES # BLD: 0.8 K/UL (ref 0.8–3.5)
LYMPHOCYTES NFR BLD: 15 % (ref 12–49)
MCH RBC QN AUTO: 27.8 PG (ref 26–34)
MCHC RBC AUTO-ENTMCNC: 33.2 G/DL (ref 30–36.5)
MCV RBC AUTO: 83.8 FL (ref 80–99)
MONOCYTES # BLD: 0.5 K/UL (ref 0–1)
MONOCYTES NFR BLD: 10 % (ref 5–13)
NEUTS SEG # BLD: 3.7 K/UL (ref 1.8–8)
NEUTS SEG NFR BLD: 72 % (ref 32–75)
PLATELET # BLD AUTO: 204 K/UL (ref 150–400)
Q-T INTERVAL, ECG07: 436 MS
QRS DURATION, ECG06: 166 MS
QTC CALCULATION (BEZET), ECG08: 547 MS
RBC # BLD AUTO: 4.5 M/UL (ref 4.1–5.7)
VENTRICULAR RATE, ECG03: 95 BPM
WBC # BLD AUTO: 5.2 K/UL (ref 4.1–11.1)

## 2018-01-15 PROCEDURE — 77030003657 HC NDL SCLER BSC -B: Performed by: SPECIALIST

## 2018-01-15 PROCEDURE — 77030010936 HC CLP LIG BSC -C: Performed by: SPECIALIST

## 2018-01-15 PROCEDURE — 74011250636 HC RX REV CODE- 250/636: Performed by: INTERNAL MEDICINE

## 2018-01-15 PROCEDURE — 77030013992 HC SNR POLYP ENDOSC BSC -B: Performed by: SPECIALIST

## 2018-01-15 PROCEDURE — 74011250637 HC RX REV CODE- 250/637: Performed by: INTERNAL MEDICINE

## 2018-01-15 PROCEDURE — 0DBL8ZX EXCISION OF TRANSVERSE COLON, VIA NATURAL OR ARTIFICIAL OPENING ENDOSCOPIC, DIAGNOSTIC: ICD-10-PCS | Performed by: SPECIALIST

## 2018-01-15 PROCEDURE — 76060000033 HC ANESTHESIA 1 TO 1.5 HR: Performed by: SPECIALIST

## 2018-01-15 PROCEDURE — 0DBH8ZZ EXCISION OF CECUM, VIA NATURAL OR ARTIFICIAL OPENING ENDOSCOPIC: ICD-10-PCS | Performed by: SPECIALIST

## 2018-01-15 PROCEDURE — 0D738ZZ DILATION OF LOWER ESOPHAGUS, VIA NATURAL OR ARTIFICIAL OPENING ENDOSCOPIC: ICD-10-PCS | Performed by: SPECIALIST

## 2018-01-15 PROCEDURE — 88305 TISSUE EXAM BY PATHOLOGIST: CPT | Performed by: SPECIALIST

## 2018-01-15 PROCEDURE — 74011000250 HC RX REV CODE- 250

## 2018-01-15 PROCEDURE — 74011250637 HC RX REV CODE- 250/637: Performed by: HOSPITALIST

## 2018-01-15 PROCEDURE — 0DBM8ZX EXCISION OF DESCENDING COLON, VIA NATURAL OR ARTIFICIAL OPENING ENDOSCOPIC, DIAGNOSTIC: ICD-10-PCS | Performed by: SPECIALIST

## 2018-01-15 PROCEDURE — 0DB68ZX EXCISION OF STOMACH, VIA NATURAL OR ARTIFICIAL OPENING ENDOSCOPIC, DIAGNOSTIC: ICD-10-PCS | Performed by: SPECIALIST

## 2018-01-15 PROCEDURE — 36415 COLL VENOUS BLD VENIPUNCTURE: CPT | Performed by: INTERNAL MEDICINE

## 2018-01-15 PROCEDURE — 88342 IMHCHEM/IMCYTCHM 1ST ANTB: CPT | Performed by: SPECIALIST

## 2018-01-15 PROCEDURE — 76040000008: Performed by: SPECIALIST

## 2018-01-15 PROCEDURE — 77030009426 HC FCPS BIOP ENDOSC BSC -B: Performed by: SPECIALIST

## 2018-01-15 PROCEDURE — 0DB58ZX EXCISION OF ESOPHAGUS, VIA NATURAL OR ARTIFICIAL OPENING ENDOSCOPIC, DIAGNOSTIC: ICD-10-PCS | Performed by: SPECIALIST

## 2018-01-15 PROCEDURE — 0DB98ZX EXCISION OF DUODENUM, VIA NATURAL OR ARTIFICIAL OPENING ENDOSCOPIC, DIAGNOSTIC: ICD-10-PCS | Performed by: SPECIALIST

## 2018-01-15 PROCEDURE — 77030019988 HC FCPS ENDOSC DISP BSC -B: Performed by: SPECIALIST

## 2018-01-15 PROCEDURE — 74011250636 HC RX REV CODE- 250/636: Performed by: HOSPITALIST

## 2018-01-15 PROCEDURE — C1726 CATH, BAL DIL, NON-VASCULAR: HCPCS | Performed by: SPECIALIST

## 2018-01-15 PROCEDURE — 65660000000 HC RM CCU STEPDOWN

## 2018-01-15 PROCEDURE — 85025 COMPLETE CBC W/AUTO DIFF WBC: CPT | Performed by: INTERNAL MEDICINE

## 2018-01-15 PROCEDURE — 74011250636 HC RX REV CODE- 250/636

## 2018-01-15 PROCEDURE — 77030018712 HC DEV BLLN INFL BSC -B: Performed by: SPECIALIST

## 2018-01-15 PROCEDURE — 93005 ELECTROCARDIOGRAM TRACING: CPT

## 2018-01-15 RX ORDER — SODIUM CHLORIDE 0.9 % (FLUSH) 0.9 %
5-10 SYRINGE (ML) INJECTION EVERY 8 HOURS
Status: COMPLETED | OUTPATIENT
Start: 2018-01-15 | End: 2018-01-15

## 2018-01-15 RX ORDER — SODIUM CHLORIDE 0.9 % (FLUSH) 0.9 %
5-10 SYRINGE (ML) INJECTION AS NEEDED
Status: ACTIVE | OUTPATIENT
Start: 2018-01-15 | End: 2018-01-15

## 2018-01-15 RX ORDER — DEXTROMETHORPHAN/PSEUDOEPHED 2.5-7.5/.8
1.2 DROPS ORAL
Status: DISCONTINUED | OUTPATIENT
Start: 2018-01-15 | End: 2018-01-15 | Stop reason: HOSPADM

## 2018-01-15 RX ORDER — SODIUM CHLORIDE 9 MG/ML
50 INJECTION, SOLUTION INTRAVENOUS CONTINUOUS
Status: DISPENSED | OUTPATIENT
Start: 2018-01-15 | End: 2018-01-15

## 2018-01-15 RX ORDER — PROPOFOL 10 MG/ML
INJECTION, EMULSION INTRAVENOUS AS NEEDED
Status: DISCONTINUED | OUTPATIENT
Start: 2018-01-15 | End: 2018-01-15 | Stop reason: HOSPADM

## 2018-01-15 RX ORDER — LIDOCAINE HYDROCHLORIDE 20 MG/ML
INJECTION, SOLUTION EPIDURAL; INFILTRATION; INTRACAUDAL; PERINEURAL AS NEEDED
Status: DISCONTINUED | OUTPATIENT
Start: 2018-01-15 | End: 2018-01-15 | Stop reason: HOSPADM

## 2018-01-15 RX ORDER — AMIODARONE HYDROCHLORIDE 200 MG/1
400 TABLET ORAL DAILY
Status: DISCONTINUED | OUTPATIENT
Start: 2018-01-16 | End: 2018-01-21

## 2018-01-15 RX ADMIN — VALSARTAN 80 MG: 40 TABLET, FILM COATED ORAL at 08:47

## 2018-01-15 RX ADMIN — Medication 10 ML: at 06:00

## 2018-01-15 RX ADMIN — FAMOTIDINE 20 MG: 20 TABLET, FILM COATED ORAL at 18:15

## 2018-01-15 RX ADMIN — ENOXAPARIN SODIUM 90 MG: 100 INJECTION SUBCUTANEOUS at 16:23

## 2018-01-15 RX ADMIN — METOPROLOL SUCCINATE 50 MG: 50 TABLET, EXTENDED RELEASE ORAL at 08:47

## 2018-01-15 RX ADMIN — FAMOTIDINE 20 MG: 20 TABLET, FILM COATED ORAL at 08:48

## 2018-01-15 RX ADMIN — LIDOCAINE HYDROCHLORIDE 50 MG: 20 INJECTION, SOLUTION EPIDURAL; INFILTRATION; INTRACAUDAL; PERINEURAL at 12:01

## 2018-01-15 RX ADMIN — PROPOFOL 670 MG: 10 INJECTION, EMULSION INTRAVENOUS at 12:55

## 2018-01-15 RX ADMIN — ONDANSETRON HYDROCHLORIDE 4 MG: 2 INJECTION, SOLUTION INTRAMUSCULAR; INTRAVENOUS at 18:25

## 2018-01-15 RX ADMIN — Medication 10 ML: at 16:23

## 2018-01-15 RX ADMIN — Medication 10 ML: at 16:22

## 2018-01-15 RX ADMIN — SODIUM CHLORIDE 50 ML/HR: 900 INJECTION, SOLUTION INTRAVENOUS at 11:14

## 2018-01-15 RX ADMIN — AMIODARONE HYDROCHLORIDE 400 MG: 200 TABLET ORAL at 08:47

## 2018-01-15 RX ADMIN — Medication 10 ML: at 21:11

## 2018-01-15 NOTE — ROUTINE PROCESS
Alina Iraheta  1945  [id number]    Situation:  Verbal report received from: Meeta JACKSON  Procedure: Procedure(s) with comments:  ESOPHAGOGASTRODUODENOSCOPY (EGD)  COLONOSCOPY  ESOPHAGOGASTRODUODENAL (EGD) BIOPSY  ESOPHAGEAL DILATION  ENDOSCOPIC POLYPECTOMY  RESOLUTION CLIP - resoluction clips  5  INJECTION  COLON BIOPSY    Background:    Preoperative diagnosis: dysphagia, wt loss  Postoperative diagnosis: diverticulosis, distal esophageal ulcer, colon mass distal transverse, polyp epatic flexure, polyp proximal transverse colon, polyp distal transverse colon, cecal polyp    :  Dr. Buckley Self  Assistant(s): Endoscopy Technician-1: Anila Andersen  Endoscopy RN-1: Nilson Strange RN    Specimens:   ID Type Source Tests Collected by Time Destination   1 : Duodenum bx Preservative Duodenum  Agata Humphrey MD 1/15/2018 1207 Pathology   2 : Gastric bx Preservative Gastric  Agata Humphrey MD 1/15/2018 1209 Pathology   3 : Distal esophagus bx Preservative Esophagus, Distal  Agata Humphrey MD 1/15/2018 1211 Pathology   4 : proximal antrum notdule bx Preservative   Agata Humphrey MD 1/15/2018 1219 Pathology   5 : cecal polyp  Saline Cecum  Agata Humphrey MD 1/15/2018 1228 Pathology   6 : Proximal transverse  colon polyp bx Preservative Colon, Transverse  Agata Humphrey MD 1/15/2018 1237 Pathology     H. Pylori  no    Assessment:  Intra-procedure medications    Anesthesia gave intra-procedure sedation and medications, see anesthesia flow sheet yes    Intravenous fluids: NS@ KVO     Vital signs stable       Abdominal assessment: round and soft       Recommendation:  Discharge patient per MD order  .   Return to floor yes  Family or Friend no  Permission to share finding with family or friend no

## 2018-01-15 NOTE — PROGRESS NOTES
CRE balloon dilatation of the esophagus   15-18 mm Balloon inflated to 15 ATMs and held for 60 seconds. 15-18 mm Balloon inflated to 16.5 ATMs and held for 60 seconds. 15-18 mm Balloon inflated to 88 ATMs and held for 60 seconds. No subcutaneous crepitus of the chest or cervical region was noted post dilatation.

## 2018-01-15 NOTE — PROGRESS NOTES
F/U for weight loss  Dysphagia  New dx of colon mass    S: Mr. Eliu Arias was seen by me today during rounds. At this time, he is resting + comfortably. He wants to eat. The patient has no new complaints today. Please see admission consult for details of ROS; there are no changes today. O: Blood pressure (!) 141/91, pulse 89, temperature 97.5 °F (36.4 °C), resp. rate 16, height 6' 4\" (1.93 m), weight 86.5 kg (190 lb 11.2 oz), SpO2 97 %. Gen: Patient is in no acute distress. There is no  jaundice. Alert and oriented. Recent Labs      01/15/18   0450  01/14/18   0418   WBC  5.2  5.5   HGB  12.5  11.9*   HCT  37.7  35.5*   PLT  204  207     Recent Labs      01/14/18   0418  01/13/18   0415   NA  141  141   K  4.0  4.0   CL  110*  108   CO2  25  28   BUN  18  24*   CREA  0.80  0.97   GLU  95  107*   CA  9.0  8.6     Recent Labs      01/14/18   0418   SGOT  190*   ALT  183*   AP  131*   TBILI  1.2*   TP  5.7*   ALB  2.3*   GLOB  3.4     No results for input(s): INR, PTP, APTT in the last 72 hours. No lab exists for component: INREXT   No results for input(s): FE, TIBC, PSAT, FERR in the last 72 hours. No results found for: FOL, RBCF   No results for input(s): PH, PCO2, PO2 in the last 72 hours. No results for input(s): CPK, CKNDX, TROIQ in the last 72 hours.     No lab exists for component: CPKMB  No results found for: CHOL, CHOLX, CHLST, CHOLV, HDL, LDL, LDLC, DLDLP, TGLX, TRIGL, TRIGP, CHHD, CHHDX  Lab Results   Component Value Date/Time    Glucose (POC) 114 01/12/2015 02:27 PM     Lab Results   Component Value Date/Time    Color YELLOW/STRAW 03/19/2015 01:20 PM    Appearance CLEAR 03/19/2015 01:20 PM    Specific gravity 1.009 03/19/2015 01:20 PM    pH (UA) 7.0 03/19/2015 01:20 PM    Protein NEGATIVE  03/19/2015 01:20 PM    Glucose NEGATIVE  03/19/2015 01:20 PM    Ketone NEGATIVE  03/19/2015 01:20 PM    Bilirubin NEGATIVE  03/19/2015 01:20 PM    Urobilinogen 0.2 03/19/2015 01:20 PM Nitrites NEGATIVE  03/19/2015 01:20 PM    Leukocyte Esterase NEGATIVE  03/19/2015 01:20 PM    Epithelial cells FEW 03/19/2015 01:20 PM    Bacteria NEGATIVE  03/19/2015 01:20 PM    WBC 0-4 03/19/2015 01:20 PM    RBC 0-5 03/19/2015 01:20 PM      Cross sectional imaging:  None new     Colonoscopy shows a colon mass  egd shows esopahgeal ulcers ?viral, possible atrophy and a submucosal nodule (?gist)      A: Principal Problem:    Atrial fibrillation with RVR ([address] (1/12/2018)    Active Problems:    Weight loss (1/12/2018)        Comment:  Colon mass is most concerning finding  P:  Clear liquids, in the event a surgeon can operate this week  Check biopsies    Check kub to localize clips  Consult Dr Castillo Angry    Will follow    Discussed in detail with wife and patient  Not sure vomting is explained by today's findings.    Zachary Haile MD  5:24 PM    1/15/2018

## 2018-01-15 NOTE — PROGRESS NOTES
Hospitalist Progress Note    NAME: Costa Holm   :     MRN:  [id number]       Assessment / Plan:  Atrial fib with RVR  Cardiology Inputs and recommendations Appreciated. Hx of cardiomyopathy with EF 30% s/p BiV ICD, last EF normalized  Hx of PAF s/p cardioversion had been in sinus on xarelto and amiodarone ,HTN and CKD. On amiodarone and rate controlled. Continue Lovenox .      Weight loss. Intermittent vomiting  GI Inputs and recommendations Appreciated. S/p  egd/colonoscopy , suspected colon cancer   Will follow up with GI plans , oncology surgeon to be on board . CT chest. Needs OP follow up. Centrilobular emphysema, 4.6 cm fusiform ascending aortic aneurysm,  interstitial septal thickening which may reflect mild edema or chronic  interstitial disease.      Acutely elevated LFTs  Hx hepatic steatosis  No abdominal pain  Follow up LFT , slowly trending down. Unremarkable CT through the abdomen and pelvis on 2017. GI on board .     Aspiration with thin liquids on MBS  Nectar thick liquids. S/p  EGD ,Small proximal antral nodule--submucosal  Distal esophageal ulcers ?viral  ,Successful empiric dilation of the ge junction. Not sure if need to start antiviral.     Body mass index is 23.21 kg/(m^2). Body mass index is 23.21 kg/(m^2). Code status: Full  Prophylaxis: Lovenox  Recommended Disposition: Home w/Family     Subjective:     Chief Complaint / Reason for Physician Visit   he is feeling fine now. Discussed with RN events overnight. Review of Systems:  Symptom Y/N Comments  Symptom Y/N Comments   Fever/Chills n   Chest Pain n    Poor Appetite n   Edema n    Cough n   Abdominal Pain y    Sputum n   Joint Pain n    SOB/GARCIA n   Pruritis/Rash n    Nausea/vomit n   Tolerating PT/OT     Diarrhea n   Tolerating Diet     Constipation n   Other       Could NOT obtain due to:      Objective:     VITALS:   Last 24hrs VS reviewed since prior progress note.  Most recent are:  Patient Vitals for the past 24 hrs:   Temp Pulse Resp BP SpO2   01/15/18 1454 97.5 °F (36.4 °C) 89 16 (!) 141/91 97 %   01/15/18 1353 97 °F (36.1 °C) 92 16 137/90 98 %   01/15/18 1336 - 87 23 154/84 98 %   01/15/18 1332 - 88 14 147/87 95 %   01/15/18 1328 - 92 12 (!) 140/93 96 %   01/15/18 1325 - 86 15 132/69 98 %   01/15/18 1322 - 91 - (!) 133/95 96 %   01/15/18 1320 - 92 - 130/85 96 %   01/15/18 1314 97.7 °F (36.5 °C) 86 15 130/85 -   01/15/18 1304 - 82 24 92/60 96 %   01/15/18 1259 - 82 30 109/70 95 %   01/15/18 1255 - 84 (!) 33 110/84 98 %   01/15/18 1109 - - - (!) 142/94 -   01/15/18 1105 97.9 °F (36.6 °C) 99 13 - 100 %   01/15/18 0827 97.7 °F (36.5 °C) 84 18 154/86 97 %   01/15/18 0805 - 94 - - -   01/15/18 0346 - - - 163/82 -   01/15/18 0345 98.6 °F (37 °C) 99 20 (!) 145/101 97 %   01/14/18 2247 97.6 °F (36.4 °C) 98 20 (!) 163/93 98 %   01/14/18 1853 97.8 °F (36.6 °C) 99 20 (!) 144/97 96 %       Intake/Output Summary (Last 24 hours) at 01/15/18 1629  Last data filed at 01/15/18 1255   Gross per 24 hour   Intake              800 ml   Output                0 ml   Net              800 ml        PHYSICAL EXAM:  General: WD, WN. Alert, cooperative, no acute distress    EENT:  EOMI. Anicteric sclerae. MMM  Resp:  CTA bilaterally, no wheezing or rales. No accessory muscle use  CV:  Regular  rhythm,  No edema  GI:  Soft, Non distended, Non tender.  +Bowel sounds s/p egd ,colonoscopy   Neurologic:  Alert and oriented X 3, normal speech,   Psych:   Good insight. Not anxious nor agitated  Skin:  No rashes.   No jaundice    Reviewed most current lab test results and cultures  YES  Reviewed most current radiology test results   YES  Review and summation of old records today    NO  Reviewed patient's current orders and MAR    YES  PMH/SH reviewed - no change compared to H&P  ________________________________________________________________________  Care Plan discussed with:    Comments   Patient y    Family  y    RN y Care Manager     Consultant                        Multidiciplinary team rounds were held today with , nursing, pharmacist and clinical coordinator. Patient's plan of care was discussed; medications were reviewed and discharge planning was addressed. ________________________________________________________________________  Total NON critical care TIME:  60   Minutes    Total CRITICAL CARE TIME Spent:   Minutes non procedure based      Comments   >50% of visit spent in counseling and coordination of care     ________________________________________________________________________  Emile Kellogg MD     Procedures: see electronic medical records for all procedures/Xrays and details which were not copied into this note but were reviewed prior to creation of Plan. LABS:  I reviewed today's most current labs and imaging studies. Pertinent labs include:  Recent Labs      01/15/18   0450  01/14/18   0418   WBC  5.2  5.5   HGB  12.5  11.9*   HCT  37.7  35.5*   PLT  204  207     Recent Labs      01/14/18   0418  01/13/18   0415   NA  141  141   K  4.0  4.0   CL  110*  108   CO2  25  28   GLU  95  107*   BUN  18  24*   CREA  0.80  0.97   CA  9.0  8.6   ALB  2.3*   --    TBILI  1.2*   --    SGOT  190*   --    ALT  183*   --        Signed:  Emile Kellogg MD

## 2018-01-15 NOTE — PROGRESS NOTES
Anesthesia reports 670mg Propofol, 50mg Lidocaine and 800mL NS given during procedure. Received report from anesthesia staff on vital signs and status of patient.

## 2018-01-15 NOTE — PROGRESS NOTES
Progress Note      1/15/2018 7:18 PM  NAME: Srinivasan Conde   MRN:  [id number]   Admit Diagnosis: Atrial fibrillation with RVR ([address]  dysphagia, wt loss     Assessment:     1. Weight loss, difficulty swallowing, CT of abd/pelvis unrevealing, after bowel prep found to be in Afib with RVR  2. Stress 2014 with no ischemia  3. Hx of cardiomyopathy with EF 30% s/p BiV ICD, last EF normalized  4. Hx of PAF s/p cardioversion had been in sinus on xarelto and amiodarone  5. HTN  6. CKD  7. S/p bilteral Knee replacement, hx of prosthesis infection    Severe weight loss, >40lbs, difficulty swallowing, nausea, undergoing GI evaluation. Here to have EGD and Colonoscopy. Found to be in afib with RVR  with dyspnea. Procedures canceled and pt admitted for further management. 6. 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 Hared to tell . In any case the rate is controlled. OK to proceed with studies tomorrow. 1/15 Endoscopy shows ulceration of the distal esophagus. Colonoscopy showed a mass in the descending Colon which is c/w colon CA. Was biopsied. For colorectal consult. Back in A fib. Rate is controlled. Continue on lovenox and transition back to Xarelto when OK           Plan:     Continue on meds. [x]        High complexity decision making was performed    Subjective:     Srinivasan Conde denies chest pain, dyspnea. Discussed with RN events overnight.      Patient Active Problem List   Diagnosis Code    Broken prosthetic joint implant ([address] T84.019A    Infection of prosthetic knee joint ([address] T84.59XA, Z96.659    Infection and inflammatory reaction due to internal joint prosthesis ([address] T84.50XA    DJD (degenerative joint disease) of knee M17.10    Atrial fibrillation with RVR ([address] I48.91    Weight loss R63.4       Review of Systems:    Symptom Y/N Comments Symptom Y/N Comments   Fever/Chills N   Chest Pain N    Poor Appetite N   Edema N    Cough N   Abdominal Pain N    Sputum N   Joint Pain N    SOB/GARCIA N   Pruritis/Rash N    Nausea/vomit N   Tolerating PT/OT Y    Diarrhea N   Tolerating Diet Y    Constipation N   Other       Could NOT obtain due to:      Objective:      Physical Exam:    Last 24hrs VS reviewed since prior progress note. Most recent are:    Visit Vitals    /90 (BP 1 Location: Left arm, BP Patient Position: At rest)    Pulse 92    Temp 97 °F (36.1 °C)    Resp 16    Ht 6' 4\" (1.93 m)    Wt 86.5 kg (190 lb 11.2 oz)    SpO2 98%    BMI 23.21 kg/m2       Intake/Output Summary (Last 24 hours) at 01/15/18 1435  Last data filed at 01/15/18 1255   Gross per 24 hour   Intake              800 ml   Output                0 ml   Net              800 ml        General Appearance: Well developed, well nourished, alert & oriented x 3,    no acute distress. Ears/Nose/Mouth/Throat: Hearing grossly normal.  Neck: Supple. Chest: Lungs clear to auscultation bilaterally. Cardiovascular: Regular rate and rhythm, S1S2 normal, no murmur. Abdomen: Soft, non-tender, bowel sounds are active. Extremities: No edema bilaterally. Skin: Warm and dry.     PMH/SH reviewed - no change compared to H&P    Data Review    Telemetry: normal sinus rhythm     Lab Data Personally Reviewed:    Recent Labs      01/15/18   0450  01/14/18   0418   WBC  5.2  5.5   HGB  12.5  11.9*   HCT  37.7  35.5*   PLT  204  207   LABRCNT(INR:3,PTP:3,APTT:3,)  Recent Labs      01/14/18   0418  01/13/18   0415   NA  141  141   K  4.0  4.0   CL  110*  108   CO2  25  28   BUN  18  24*   CREA  0.80  0.97   GLU  95  107*   CA  9.0  8.6   LABRCNT(CPK:3,CpKMB:3,ckndx:3,troiq:3)No results found for: CHOL, CHOLX, CHLST, CHOLV, HDL, LDL, LDLC, DLDLP, TGLX, TRIGL, TRIGP, CHHD, CHHDXLABRCNT(sgot:3,gpt:3,ap:3,tbiL:3,TP:3,ALB:3,GLOB:3,ggt:3,aml:3,amyp:3,lpse:3,hlpse:3)No results for input(s): PH, PCO2, PO2 in the last 72 hours. No results found for: CHOL, CHOLX, CHLST, CHOLV, HDL, LDL, LDLC, DLDLP, TGLX, TRIGL, TRIGP, CHHD, CHHDXMEDTABLETimothy Yuli Mckenna MD  No results for input(s): PH, PCO2, PO2 in the last 72 hours.     Medications Personally Reviewed:    Current Facility-Administered Medications   Medication Dose Route Frequency    0.9% sodium chloride infusion  50 mL/hr IntraVENous CONTINUOUS    sodium chloride (NS) flush 5-10 mL  5-10 mL IntraVENous Q8H    sodium chloride (NS) flush 5-10 mL  5-10 mL IntraVENous PRN    [START ON 1/16/2018] amiodarone (CORDARONE) tablet 400 mg  400 mg Oral DAILY    PEG 3350-Electrolytes (GO-LYTELY) SUSPENSION 2,000 mL  2,000 mL Oral ONCE    metoprolol succinate (TOPROL-XL) XL tablet 50 mg  50 mg Oral DAILY    valsartan (DIOVAN) tablet 80 mg  80 mg Oral DAILY    sodium chloride (NS) flush 5-10 mL  5-10 mL IntraVENous Q8H    sodium chloride (NS) flush 5-10 mL  5-10 mL IntraVENous PRN    acetaminophen (TYLENOL) tablet 650 mg  650 mg Oral Q6H PRN    ondansetron (ZOFRAN) injection 4 mg  4 mg IntraVENous Q6H PRN    enoxaparin (LOVENOX) injection 90 mg  1 mg/kg SubCUTAneous Q12H    famotidine (PEPCID) tablet 20 mg  20 mg Oral BID         Chris Ceja MD

## 2018-01-15 NOTE — PROGRESS NOTES
Initial Nutrition Assessment:    INTERVENTIONS/RECOMMENDATIONS:   · Diet advancement per GI  · Ensure clear TID    ASSESSMENT:   Chart reviewed, medically noted for weight loss, intermittent vomiting, dysphagia, h/o hepatic steatosis and PMH shown below. Attempted to visit with pt but he was off floor for EGD/colonoscopy. Per chart review pt has experienced a severe weight loss of 40 lbs (17%) in the past 5 months due to intermittent vomiting and dysphagia. Pt meets ASPEN criteria for chronic severe malnutrition, see below. EGD/colonoscopy revealed Mass of colon, gastric nodule and esophagitis. Pt resumed clear liquid diet for possible GI surgery. Of note diet was advance to clear liquids without nectar thick consistency. Will monitor PO intake and plan. Meets Criteria for Chronic Malnutrition   [x] Severe Malnutrition, as evidenced by:   [x] Severe muscle wasting, loss of subcutaneous fat   [x] Nutritional intake of <75% of recommended intake for >1 month   [] Weight loss of  >5% in 1 month, >7.5% in 3 months, >10% in 6 months, >20% in 1 year   [] Severe edema   []Moderate Malnutrition, as evidenced by:   [] Mild muscle wasting, loss of subcutaneous fat   [] Nutritional intake <75% of recommended intake for >1 month   [] Weight loss of  5% in 1 month, 7.5% in 3 months, 10% in 6 months, or 20% in 1 year   [] Mild edema        Past Medical History:   Diagnosis Date    Arrhythmia     afib    Arthritis     Atrial fibrillation ([facility] Dx 5/19/14    Dr Michael Sullivan [phone]    Hypertension     Hypertrophy (benign) of prostate     Thromboembolus [facility]) 2003    s/p Lt knee surgery (was on Coumadin x3 yr)       Diet Order: Clear liquids  % Eaten:  No data found.     Pertinent Medications: [x]Reviewed: NS, pepcid,   Pertinent Labs: [x]Reviewed:   Food Allergies: [x]NKFA  []Other   Last BM: 1/15  Edema:        []RUE   []LUE   []RLE   []LLE      Pressure Injury:      [] Stage I   [] Stage II   [] Stage III   [] Stage IV      Wt Readings from Last 30 Encounters:   01/12/18 86.5 kg (190 lb 11.2 oz)   03/23/15 101.2 kg (223 lb)   03/19/15 101.3 kg (223 lb 6 oz)   03/10/15 106.1 kg (234 lb)   02/23/15 102.1 kg (225 lb)   01/29/15 106.1 kg (234 lb)   01/15/15 106.5 kg (234 lb 14.4 oz)   01/09/15 109.3 kg (241 lb)   10/02/14 110.7 kg (244 lb)   08/26/14 108.9 kg (240 lb)   07/14/14 115.7 kg (255 lb)   05/27/14 113.9 kg (251 lb)   05/23/14 115.9 kg (255 lb 8.2 oz)   05/15/14 117.9 kg (260 lb)       Anthropometrics:   Height: 6' 4\" (193 cm) Weight: 86.5 kg (190 lb 11.2 oz)   IBW (%IBW):   ( ) UBW (%UBW):   (  %)   Last Weight Metrics:  Weight Loss Metrics 1/12/2018 3/23/2015 3/19/2015 3/10/2015 2/23/2015 1/29/2015 1/15/2015   Today's Wt 190 lb 11.2 oz 223 lb 223 lb 6 oz 234 lb 225 lb 234 lb 234 lb 14.4 oz   BMI 23.21 kg/m2 27.16 kg/m2 27.2 kg/m2 28.5 kg/m2 27.4 kg/m2 28.5 kg/m2 -       BMI: Body mass index is 23.21 kg/(m^2). This BMI is indicative of:   []Underweight    [x]Normal    []Overweight    [] Obesity   [] Extreme Obesity (BMI>40)     Estimated Nutrition Needs (Based on):   2225 Kcals/day (BMR: 1710 x 1.3) , 105 g (1.2 g/kg) Protein  Carbohydrate:  At Least 130 g/day  Fluids: 2225 mL/day (1ml/kcal) or per primary team    NUTRITION DIAGNOSES:   Problem:  Inadequate protein-energy intake      Etiology: related to vomiting and dysphagia     Signs/Symptoms: as evidenced by 17% wt loss x 5 months      NUTRITION INTERVENTIONS:  Meals/Snacks: General/healthful diet   Supplements: Commercial supplement              GOAL:   consume >50% of meals and ONS in 2-4 days    LEARNING NEEDS (Diet, Food/Nutrient-Drug Interaction):    [x] None Identified   [] Identified and Education Provided/Documented   [] Identified and Pt declined/was not appropriate     Cultureal, Buddhism, OR Ethnic Dietary Needs:    [x] None Identified   [] Identified and Addressed     [x] Interdisciplinary Care Plan Reviewed/Documented    [x] Discharge Planning: TBD    MONITORING /EVALUATION:      Food/Nutrient Intake Outcomes:  Total energy intake  Physical Signs/Symptoms Outcomes: Weight/weight change, Growth pattern, Electrolyte and renal profile, GI    NUTRITION RISK:    [x] High              [] Moderate           []  Low  []  Minimal/Uncompromised    PT SEEN FOR:    []  MD Consult: []Calorie Count      []Diabetic Diet Education        []Diet Education     []Electrolyte Management     []General Nutrition Management and Supplements     []Management of Tube Feeding     []TPN Recommendations    [x]  RN Referral:  [x]MST score >=2     []Enteral/Parenteral Nutrition PTA     []Pregnant: Gestational DM or Multigestation     []Pressure Ulcer/Wound Care needs        []  Low BMI  []  LOS Referral       Kassidy Sidhu RDN  Pager [phone]  Weekend Pager [phone]

## 2018-01-15 NOTE — PROGRESS NOTES
TRANSFER - OUT REPORT:    Verbal report given to MANUEL Patel(name) on Tj Gil  being transferred to 2290(unit) for routine progression of care       Report consisted of patients Situation, Background, Assessment and   Recommendations(SBAR). Information from the following report(s) SBAR, Kardex, Procedure Summary, Recent Results and Cardiac Rhythm Paced, A-fib was reviewed with the receiving nurse. Lines:   Peripheral IV 01/12/18 Right Antecubital (Active)   Site Assessment Clean, dry, & intact 1/15/2018 11:00 AM   Phlebitis Assessment 0 1/15/2018 11:00 AM   Infiltration Assessment 0 1/15/2018 11:00 AM   Dressing Status Clean, dry, & intact 1/15/2018 11:00 AM   Dressing Type Transparent;Tape 1/15/2018 11:00 AM   Hub Color/Line Status Pink;Flushed; Infusing 1/15/2018 11:00 AM   Action Taken Blood drawn 1/12/2018 11:20 AM       Peripheral IV 01/12/18 Left Forearm (Active)   Site Assessment Clean, dry, & intact 1/15/2018  1:25 PM   Phlebitis Assessment 0 1/15/2018  1:25 PM   Infiltration Assessment 0 1/15/2018  1:25 PM   Dressing Status Clean, dry, & intact 1/15/2018  1:25 PM   Dressing Type Transparent 1/15/2018  1:25 PM   Hub Color/Line Status Infusing 1/15/2018  1:25 PM        Opportunity for questions and clarification was provided.

## 2018-01-15 NOTE — PROGRESS NOTES
Bedside shift change report given to Geovanni Abarca (oncoming nurse) by Devoria Moritz (offgoing nurse). Report included the following information SBAR.

## 2018-01-15 NOTE — ANESTHESIA POSTPROCEDURE EVALUATION
Post-Anesthesia Evaluation and Assessment    Patient: Samantha Mcnally MRN: [id number]  SSN: [id number]    YOB: 1945  Age: 71 y.o. Sex: male       Cardiovascular Function/Vital Signs  Visit Vitals    /84    Pulse 87    Temp 36.5 °C (97.7 °F)    Resp 23    Ht 6' 4\" (1.93 m)    Wt 86.5 kg (190 lb 11.2 oz)    SpO2 98%    BMI 23.21 kg/m2       Patient is status post total IV anesthesia, MAC anesthesia for Procedure(s):  ESOPHAGOGASTRODUODENOSCOPY (EGD)  COLONOSCOPY  ESOPHAGOGASTRODUODENAL (EGD) BIOPSY  ESOPHAGEAL DILATION  ENDOSCOPIC POLYPECTOMY  RESOLUTION CLIP  INJECTION  COLON BIOPSY. Nausea/Vomiting: None    Postoperative hydration reviewed and adequate. Pain:  Pain Scale 1: Visual (01/15/18 1336)  Pain Intensity 1: 0 (01/15/18 1336)   Managed    Neurological Status: At baseline    Mental Status and Level of Consciousness: Arousable    Pulmonary Status:   O2 Device: Room air (01/15/18 1336)   Adequate oxygenation and airway patent    Complications related to anesthesia: None    Post-anesthesia assessment completed.  No concerns    Signed By: Amadeo Peraza DO     January 15, 2018

## 2018-01-15 NOTE — H&P
Pre-endoscopy H and P    The patient was seen and examined in the endoscopy suite. The airway was assessed and docuemented. The problem list, past medical history, and medications were reviewed. Patient Active Problem List   Diagnosis Code    Broken prosthetic joint implant ([address] T84.019A    Infection of prosthetic knee joint ([address] T84.59XA, Z96.659    Infection and inflammatory reaction due to internal joint prosthesis ([address] T84.50XA    DJD (degenerative joint disease) of knee M17.10    Atrial fibrillation with RVR ([address] I48.91    Weight loss R63.4     Social History     Social History    Marital status:      Spouse name: N/A    Number of children: N/A    Years of education: N/A     Occupational History    Not on file. Social History Main Topics    Smoking status: Former Smoker     Packs/day: 1.00     Years: 30.00     Quit date: 5/23/2004    Smokeless tobacco: Never Used    Alcohol use No      Comment: no alcohol for the pask 6 months    Drug use: No    Sexual activity: Not on file     Other Topics Concern    Not on file     Social History Narrative     Past Medical History:   Diagnosis Date    Arrhythmia     afib    Arthritis     Atrial fibrillation ([address] Dx 5/19/14    Dr Smitha Mckeon [phone]    Hypertension     Hypertrophy (benign) of prostate     Thromboembolus [facility]) 2003    s/p Lt knee surgery (was on Coumadin x3 yr)     The patient has a family history of na    Prior to Admission Medications   Prescriptions Last Dose Informant Patient Reported? Taking?   amiodarone (PACERONE) 400 mg tablet   No Yes   Sig: Take 1 Tab by mouth daily. famotidine (PEPCID) 20 mg tablet   No Yes   Sig: Take 1 Tab by mouth two (2) times a day. furosemide (LASIX) 40 mg tablet   No Yes   Sig: DO NOT TAKE FOR BLOOD PRESSURE UNDER 130/80   losartan-hydrochlorothiazide (HYZAAR) 100-25 mg per tablet   No Yes   Sig: Take 1 Tab by mouth daily.  DO NOT TAKE FOR BLOOD PRESSURE UNDER 130/80 metoprolol succinate (TOPROL-XL) 50 mg XL tablet   Yes Yes   Sig: Take 50 mg by mouth daily. potassium chloride SA (MICRO-K) 10 mEq capsule   Yes Yes   Sig: Take 10 mEq by mouth daily. rivaroxaban (XARELTO) 20 mg tab tablet   Yes Yes   Sig: Take 20 mg by mouth daily. Facility-Administered Medications: None           The review of systems is:  negative for shortness of breath or chest pain      The heart, lungs, and mental status were satisfactory for the administration of anesthesia sedation and for the procedure. I discussed with the patient the objectives, risks, consequences and alternatives to the procedure.       Patsy Smallwood MD  1/15/2018  11:57 AM

## 2018-01-15 NOTE — PROGRESS NOTES
Problem: Falls - Risk of  Goal: *Absence of Falls  Document Darius Fall Risk and appropriate interventions in the flowsheet.    Outcome: Progressing Towards Goal  Fall Risk Interventions:  Mobility Interventions: Patient to call before getting OOB         Medication Interventions: Patient to call before getting OOB    Elimination Interventions: Call light in reach    History of Falls Interventions: Consult care management for discharge planning, Door open when patient unattended, Evaluate medications/consider consulting pharmacy, Investigate reason for fall, Room close to nurse's station

## 2018-01-15 NOTE — ANESTHESIA PREPROCEDURE EVALUATION
Anesthetic History   No history of anesthetic complications            Review of Systems / Medical History  Patient summary reviewed, nursing notes reviewed and pertinent labs reviewed    Pulmonary  Within defined limits                 Neuro/Psych   Within defined limits           Cardiovascular    Hypertension        Dysrhythmias : atrial fibrillation  Pacemaker    Exercise tolerance: >4 METS     GI/Hepatic/Renal  Within defined limits              Endo/Other        Arthritis     Other Findings   Comments: Hx of DVT           Physical Exam    Airway  Mallampati: II  TM Distance: 4 - 6 cm  Neck ROM: normal range of motion   Mouth opening: Normal     Cardiovascular  Regular rate and rhythm,  S1 and S2 normal,  no murmur, click, rub, or gallop             Dental  No notable dental hx       Pulmonary  Breath sounds clear to auscultation               Abdominal  GI exam deferred       Other Findings            Anesthetic Plan    ASA: 2  Anesthesia type: total IV anesthesia and MAC          Induction: Intravenous  Anesthetic plan and risks discussed with: Patient

## 2018-01-15 NOTE — PROGRESS NOTES
SPEECH THERAPY SCREENING:  SERVICES ARE INDICATED AND THERAPY ORDER IS REQUIRED    An InBasket screening referral was triggered for speech therapy based on results obtained during the nursing admission assessment. The patients chart was reviewed and the patient is appropriate for a skilled therapy evaluation. Please order a consult for speech therapy if you are in agreement and would like an evaluation to be completed. Thank you.     Tiana Davis, SLP

## 2018-01-15 NOTE — PROCEDURES
Esophagogastroduodenoscopy    Indications:  Dysphagia  Weight loss    Medications:  See anesthesia form    Post procedure diagnosis:  See below    Description of Procedure:    Prior to the procedure its objectives, risks, consequences and alternatives were discussed with the patient who then elected to proceed. The Olympus video endoscope was inserted under direct vision into the mouth and then into the esophagus. The esophagus looked normal to the distal esophagus. There were shallow ulcerations with vesicles present in two lines, just above the z line. The z-line was located at 38 cm. There were no diagnostic mucosal abnormalities of the body, fundus, antrum, cardia and incisura of the stomach. This included direct and retroflexion examination. A small nodule was present in the proximal antrum. It was submucosal  The first and second portion of the duodenum appeared normal.  I took biopsies of the duodenum, stomach, esophageal ulcers and the antral nodule. I then dilated the distal esophagus with a through the scope balloon. I dilated from 15mm to 16.5mm to 18 mm. Each time the balloon was inflated for sixty seconds. There were no apparent complications. Complications: There were no apparent complications and the patient tolerated the procedure well.         Estimated Blood Loss:  none  Specimens Removed:  Duodenum  Stomach  Distal esophagus  Antral nodule  Impressions:  Small proximal antral nodule--submucosal  Distal esophageal ulcers ?viral   Successful empiric dilation of the ge junction        Signed By: Dante Garcia MD                        January 15, 2018     12:22 PM

## 2018-01-15 NOTE — PROGRESS NOTES
Stacey Parry is a 71 yr old male who has been admitted due to a-fib, weight loss, aspiration with liquids and elevated LFT's and is undergoing a GI W/U due to mass in colon. Patient reports he is independent with ADL's and IADL's. Patient was sitting on edge of bed with family in room. CM to follow hospital course and assess for discharge needs. Care Management Interventions  PCP Verified by CM:  Yes  MyChart Signup: No  Discharge Durable Medical Equipment: No  Physical Therapy Consult: No  Occupational Therapy Consult: No  Speech Therapy Consult: No  Current Support Network: Lives with Spouse  Confirm Follow Up Transport: Family  Plan discussed with Pt/Family/Caregiver: Yes  Freedom of Choice Offered: Yes     [phone]

## 2018-01-15 NOTE — PROCEDURES
Colonoscopy    Indications: unexplained weight loss    Pre-operative Diagnosis: see above     Medications:  See anesthesia form    Post-operative Diagnosis:  diverticulosis,, colon mass (descending colon) proximal transverse, polyps       Procedure Details   Prior to the procedure its objectives, risks, consequences and alternatives were discussed with the patient who then elected to proceed. All questions were answered. Digital Rectal Exam:  was normal     The Olympus videocolonoscope was inserted in the rectum and advanced to the cecum. The cecum was identified by typical landmarks. I removed an 8mm sessile polyp in the cecum by cold snare. The site was clipped. The colonoscope was slowly and carefully withdrawn as the mucosa was inspected. In the area of the hepatic flexure and the proximal transverse colon there were three polyps removed. (8mm to 14mm) all by hot snare, site clipped. In what appeared to be the proximal descneding colon there was an 11 mm polyp left in place. At 65 cm in what appeared to be the descending colon there was a rubbery sessile mass that appeared malignant. It was soft to biopsy. I took biopsies. I injected phan ink at 75 cm and at 55 cm to span the polyp and the mass. (3 x 0.5 ml injections at each location. I clipped the mucosa just distal to the mass to facilitate a kub for more precise surgical localization. Multiple left sided diverticula were present. Retroflexion in the rectum was negative. Photos to document the ileocecal valve, appendiceal orifice and retroflexion exam were obtained.       The preparation was adequate      Estimated Blood Loss:  none    Specimens:  Cecal polyp  Ascending/ proximal transverse colon polyps  Descending colon mass      Findings:  Mass left colon, likely descending colon distal to splenic flexure--sampled and marked with tattoos and a clip  multiplpe polyps--all removed polyps were clipped for anticoagulation      Complications:  none    Repeat colonoscopy is recommended in:  1 year (after surgery).                Cara Griggs MD  12:57 PM  1/15/2018

## 2018-01-15 NOTE — CARDIO/PULMONARY
CP Rehab Note: chart review    Admit: Weight loss, difficulty swallowing, CT of abd/pelvis unrevealing, after bowel prep found to be in Afib with RVR    Mhx:     1. Stress 2014 with no ischemia  2. Hx of cardiomyopathy with EF 30% s/p BiV ICD, last EF normalized  3. Hx of PAF s/p cardioversion had been in sinus on xarelto and amiodarone  4. HTN  5. CKD  6. S/p bilteral Knee replacement, hx of prosthesis infection  7. , helps care for son with CVA, mild functional capacity    Former smoker. Cardiology note yesterday:  1/14 appears to be sinus with PACs on tele. Hared to tell . In any case the rate is controlled. OK to proceed with studies tomorrow. Former smoker. Patient prepped for EGD/colonoscopy today. CP Rehab visit not indicated.

## 2018-01-15 NOTE — PROGRESS NOTES
TRANSFER - IN REPORT:    Verbal report received from [address], RN(name) on Yudith Chol  being received from 2290(unit) for ordered procedure      Report consisted of patients Situation, Background, Assessment and   Recommendations(SBAR). Information from the following report(s) SBAR, Kardex, Intake/Output, MAR, Recent Results, Med Rec Status and Cardiac Rhythm Paced, A-Fib was reviewed with the receiving nurse. Opportunity for questions and clarification was provided. Assessment completed upon patients arrival to unit and care assumed.

## 2018-01-15 NOTE — PROGRESS NOTES
Pt only drank half of go-lytely and refused other half. Pt states he is clear due to \"not eating last few days\". Pt appears to be clear.

## 2018-01-15 NOTE — PROGRESS NOTES
0730  Report received from Linda Dixon, [facility]. SBAR, Kardex, ED Summary, Procedure Summary, Intake/Output, MAR, Accordion, Recent Results, Med Rec Status and Cardiac Rhythm a-fib were discussed. [address] [facility] to administer morning medications per Dr. Senia Hewitt, anesthesiologist with a sip of water.

## 2018-01-16 ENCOUNTER — APPOINTMENT (OUTPATIENT)
Dept: GENERAL RADIOLOGY | Age: 73
DRG: 264 | End: 2018-01-16
Attending: SPECIALIST
Payer: MEDICARE

## 2018-01-16 PROCEDURE — 74011250637 HC RX REV CODE- 250/637: Performed by: INTERNAL MEDICINE

## 2018-01-16 PROCEDURE — 74011250636 HC RX REV CODE- 250/636: Performed by: SURGERY

## 2018-01-16 PROCEDURE — 74011250637 HC RX REV CODE- 250/637: Performed by: HOSPITALIST

## 2018-01-16 PROCEDURE — 74018 RADEX ABDOMEN 1 VIEW: CPT

## 2018-01-16 PROCEDURE — 65660000000 HC RM CCU STEPDOWN

## 2018-01-16 PROCEDURE — 74011250636 HC RX REV CODE- 250/636: Performed by: HOSPITALIST

## 2018-01-16 RX ORDER — DILTIAZEM HYDROCHLORIDE 180 MG/1
180 CAPSULE, COATED, EXTENDED RELEASE ORAL DAILY
Status: DISCONTINUED | OUTPATIENT
Start: 2018-01-16 | End: 2018-01-23 | Stop reason: HOSPADM

## 2018-01-16 RX ADMIN — ENOXAPARIN SODIUM 90 MG: 100 INJECTION SUBCUTANEOUS at 03:34

## 2018-01-16 RX ADMIN — FAMOTIDINE 20 MG: 20 TABLET, FILM COATED ORAL at 10:24

## 2018-01-16 RX ADMIN — AMIODARONE HYDROCHLORIDE 400 MG: 200 TABLET ORAL at 10:24

## 2018-01-16 RX ADMIN — ENOXAPARIN SODIUM 90 MG: 100 INJECTION SUBCUTANEOUS at 17:26

## 2018-01-16 RX ADMIN — ONDANSETRON HYDROCHLORIDE 4 MG: 2 INJECTION, SOLUTION INTRAMUSCULAR; INTRAVENOUS at 10:31

## 2018-01-16 RX ADMIN — Medication 10 ML: at 20:49

## 2018-01-16 RX ADMIN — VALSARTAN 80 MG: 40 TABLET, FILM COATED ORAL at 10:24

## 2018-01-16 RX ADMIN — Medication 10 ML: at 17:27

## 2018-01-16 RX ADMIN — METOPROLOL SUCCINATE 50 MG: 50 TABLET, EXTENDED RELEASE ORAL at 10:24

## 2018-01-16 RX ADMIN — FAMOTIDINE 20 MG: 20 TABLET, FILM COATED ORAL at 17:26

## 2018-01-16 RX ADMIN — DILTIAZEM HYDROCHLORIDE 180 MG: 180 CAPSULE, COATED, EXTENDED RELEASE ORAL at 10:24

## 2018-01-16 RX ADMIN — Medication 10 ML: at 03:34

## 2018-01-16 NOTE — CONSULTS
Consult    Patient: Costa Holm MRN: [id number]  SSN: [id number]    YOB: 1945  Age: 71 y.o. Sex: male      Subjective:      Costa Holm is a 71 y.o. male who is being seen for colon mass in the proximal descending colon, likely malignant. He presented with nausea, decreased appetite and severe weight loss. He went into rapid afib. Rate better controlled now.   Colonoscopy yesterday showed:  1) 8mm polyp at cecum (cold snare, clipped)  2) Three 8mm-14mm poylps at Prox TV colon (hot snare, clipped)  3) 11mm Polyp at Prox descending colon (left in place)  4) Rubbery sessile mass also in Prox descending colon (biopsied, tattooed prox/distal, clip placed distal)    KUB confirms these clips to be along the splenic flexure        Past Medical History:   Diagnosis Date    Arrhythmia     afib    Arthritis     Atrial fibrillation ([address] Dx 5/19/14    Dr Zane Pittman [phone]    Hypertension     Hypertrophy (benign) of prostate     Thromboembolus [facility]) 2003    s/p Lt knee surgery (was on Coumadin x3 yr)     Past Surgical History:   Procedure Laterality Date    COLONOSCOPY N/A 1/15/2018    COLON[facility]  1/15/2018         COLONOSCOPY,MILAGROS [address]  1/15/2018         HX ORTHOPAEDIC      back surgery no hardware    HX ORTHOPAEDIC  2014    right knee surgery arthroscopy    HX ORTHOPAEDIC  3/23/15    REVISION TO LEFT TOTAL KNEE REPLACEMENT    HX PACEMAKER  8/2014    3 wire    TOTAL KNEE ARTHROPLASTY  2003    Left    TOTAL KNEE ARTHROPLASTY  2009    Right    UPPER GI ENDOSCOPY,BIOPSY  1/15/2018           Family History   Problem Relation Age of Onset    Cancer Mother      throat cancer    Cancer Father      kidney cancer    Hypertension Sister      Social History   Substance Use Topics    Smoking status: Former Smoker     Packs/day: 1.00     Years: 30.00     Quit date: 5/23/2004    Smokeless tobacco: Never Used    Alcohol use No      Comment: no alcohol for the pask 6 months      Current Facility-Administered Medications   Medication Dose Route Frequency Provider Last Rate Last Dose    dilTIAZem CD (CARDIZEM CD) capsule 180 mg  180 mg Oral DAILY Mouna Mccoy MD   180 mg at 01/16/18 1024    amiodarone (CORDARONE) tablet 400 mg  400 mg Oral DAILY Mouna Mccoy MD   400 mg at 01/16/18 1024    metoprolol succinate (TOPROL-XL) XL tablet 50 mg  50 mg Oral DAILY Pedrito Joseph MD   50 mg at 01/16/18 1024    valsartan (DIOVAN) tablet 80 mg  80 mg Oral DAILY Mouna Mccoy MD   80 mg at 01/16/18 1024    sodium chloride (NS) flush 5-10 mL  5-10 mL IntraVENous Q8H Bro Dimas MD   10 mL at 01/16/18 0334    sodium chloride (NS) flush 5-10 mL  5-10 mL IntraVENous PRN Bro Dimas MD        acetaminophen (TYLENOL) tablet 650 mg  650 mg Oral Q6H PRN Bro Dimas MD        ondansetron [facility]) injection 4 mg  4 mg IntraVENous Q6H PRN Bro Dimas MD   4 mg at 01/16/18 1031    enoxaparin (LOVENOX) injection 90 mg  1 mg/kg SubCUTAneous Q12H Bro Dimas MD   90 mg at 01/16/18 0334    famotidine (PEPCID) tablet 20 mg  20 mg Oral BID Bro Dimas MD   20 mg at 01/16/18 1024        No Known Allergies    Review of Systems:  A comprehensive review of systems was negative except for that written in the History of Present Illness. Objective:     Vitals:    01/16/18 0338 01/16/18 0838 01/16/18 1118 01/16/18 1633   BP: 155/88 149/88 134/86 120/76   Pulse: (!) 102 (!) 106 99 100   Resp: 18 18 18 18   Temp: 97.9 °F (36.6 °C) 97.9 °F (36.6 °C) 97.5 °F (36.4 °C) 98.2 °F (36.8 °C)   SpO2: 97% 96% 98% 100%   Weight:       Height:            Physical Exam:  General:  Alert, cooperative, no distress, appears stated age. Eyes:  Conjunctivae/corneas clear. PERRL, EOMs intact. Ears:  Normal external ear canals both ears. Nose: Nares normal. Septum midline.  Mucosa normal. No drainage or sinus tenderness. Mouth/Throat: Lips, mucosa, and tongue normal. Teeth and gums normal.   Neck: Supple, symmetrical, trachea midline, no adenopathy, thyroid: no enlargment/tenderness/nodules, no carotid bruit and no JVD. Back:   Symmetric, no curvature. ROM normal. No CVA tenderness. Lungs:   Clear to auscultation bilaterally. Heart:  Regular rate and rhythm, S1, S2 normal, no murmur, click, rub or gallop. Abdomen:   Soft, non-tender. Bowel sounds normal. No masses,  No organomegaly. Extremities: Extremities normal, atraumatic, no cyanosis or edema. Pulses: 2+ and symmetric all extremities. Skin: Skin color, texture, turgor normal. No rashes or lesions   Lymph nodes: Cervical, supraclavicular, and normal.   Neurologic: CNII-XII intact. Normal strength, sensation and reflexes throughout. Assessment:     Hospital Problems  Date Reviewed: 1/15/2018          Codes Class Noted POA    * (Principal)Atrial fibrillation with RVR [address] [address] ICD-10-CM: I48.91  ICD-9-CM: 427.31  1/12/2018 Yes        Weight loss ICD-10-CM: R63.4  ICD-9-CM: 783.21  1/12/2018 Yes              Plan:     72M presented with rapid afib, dysphagia, and weight loss. Found to have a likely malignant lesion in the proximal descending colon / splenic flexure    -CEA tomorrow  -Follow up biopsies  -Surgery scheduled for Thursday  -Keep on clears.   NPO after midnight tomorrow night  -Stop lovenox after tomorrow morning's dose      Signed By: Adela Ordaz MD     January 16, 2018

## 2018-01-16 NOTE — PROGRESS NOTES
Problem: Falls - Risk of  Goal: *Absence of Falls  Document Darius Fall Risk and appropriate interventions in the flowsheet. Outcome: Progressing Towards Goal  Fall Risk Interventions:  Mobility Interventions: Bed/chair exit alarm, Communicate number of staff needed for ambulation/transfer, Mechanical lift, OT consult for ADLs, Patient to call before getting OOB         Medication Interventions: Bed/chair exit alarm, Evaluate medications/consider consulting pharmacy, Patient to call before getting OOB, Teach patient to arise slowly    Elimination Interventions: Call light in reach, Elevated toilet seat, Patient to call for help with toileting needs, Toilet paper/wipes in reach, Toileting schedule/hourly rounds    History of Falls Interventions:  Investigate reason for fall

## 2018-01-16 NOTE — PROGRESS NOTES
0730  [facility] SB[facility], Kardex, ED Summary, Procedure Summary, Intake/Output, MAR, Accordion, Recent Results, Med Rec Status and Cardiac Rhythm a-fib/paced were discussed. Nely Bonilla  Notified cardiologist that patient has increased HR in low 100's to 115-120 and is a-fib, pacer spikes are in QRS. Dr. Hali Humphries notified, no new orders.

## 2018-01-16 NOTE — PROGRESS NOTES
Progress Note      1/16/2018 7:18 PM  NAME: Quita Arnett   MRN:  [id number]   Admit Diagnosis: Atrial fibrillation with RVR ([address]  dysphagia, wt loss     Assessment:     1. Weight loss, difficulty swallowing, CT of abd/pelvis unrevealing, after bowel prep found to be in Afib with RVR  2. Stress 2014 with no ischemia  3. Hx of cardiomyopathy with EF 30% s/p BiV ICD, last EF normalized  4. Hx of PAF s/p cardioversion had been in sinus on xarelto and amiodarone  5. HTN  6. CKD  7. S/p bilteral Knee replacement, hx of prosthesis infection    Severe weight loss, >40lbs, difficulty swallowing, nausea, undergoing GI evaluation. Here to have EGD and Colonoscopy. Found to be in afib with RVR  with dyspnea. Procedures canceled and pt admitted for further management. 6. 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 Hared to tell . In any case the rate is controlled. OK to proceed with studies tomorrow. 1/15 Endoscopy shows ulceration of the distal esophagus. Colonoscopy showed a mass in the descending Colon which is c/w colon CA. Was biopsied. For colorectal consult. Back in A fib. Rate is controlled. Continue on lovenox and transition back to Xarelto when OK     1/16 HR is still up a little. Add Cardizem for rate control          Plan:     Continue on meds. [x]        High complexity decision making was performed    Subjective:     Quita Arnett denies chest pain, dyspnea. Discussed with RN events overnight.      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 Most recent are:    Visit Vitals    /88 (BP 1 Location: Right arm, BP Patient Position: At rest)    Pulse (!) 106    Temp 97.9 °F (36.6 °C)    Resp 18    Ht 6' 4\" (1.93 m)    Wt 86.5 kg (190 lb 11.2 oz)    SpO2 96%    BMI 23.21 kg/m2       Intake/Output Summary (Last 24 hours) at 01/16/18 1007  Last data filed at 01/16/18 0338   Gross per 24 hour   Intake             1440 ml   Output                0 ml   Net             1440 ml        General Appearance: Well developed, well nourished, alert & oriented x 3,    no acute distress. Ears/Nose/Mouth/Throat: Hearing grossly normal.  Neck: Supple. Chest: Lungs clear to auscultation bilaterally. Cardiovascular: Regular rate and rhythm, S1S2 normal, no murmur. Abdomen: Soft, non-tender, bowel sounds are active. Extremities: No edema bilaterally. Skin: Warm and dry.     PMH/SH reviewed - no change compared to H&P    Data Review    Telemetry: normal sinus rhythm     Lab Data Personally Reviewed:    Recent Labs      01/15/18   0450  01/14/18   0418   WBC  5.2  5.5   HGB  12.5  11.9*   HCT  37.7  35.5*   PLT  204  207   LABRCNT(INR:3,PTP:3,APTT:3,)  Recent Labs      01/14/18   0418   NA  141   K  4.0   CL  110*   CO2  25   BUN  18   CREA  0.80   GLU  95   CA  9.0   LABRCNT(CPK:3,CpKMB:3,ckndx:3,troiq:3)No results found for: CHOL, CHOLX, CHLST, CHOLV, HDL, LDL, LDLC, DLDLP, TGLX, TRIGL, TRIGP, CHHD, CHHDXLABRCNT(sgot:3,gpt:3,ap:3,tbiL:3,TP:3,ALB:3,GLOB:3,ggt:3,aml:3,amyp:3,lpse:3,hlpse:3)No results for input(s): PH, PCO2, PO2 in the last 72 hours. No results found for: CHOL, CHOLX, CHLST, CHOLV, HDL, LDL, LDLC, DLDLP, TGLX, TRIGL, TRIGP, CHHD, CHHDXMEDTABLETimmali Suggs MD  No results for input(s): PH, PCO2, PO2 in the last 72 hours.     Medications Personally Reviewed:    Current Facility-Administered Medications   Medication Dose Route Frequency    dilTIAZem CD (CARDIZEM CD) capsule 180 mg  180 mg Oral DAILY    amiodarone (CORDARONE) tablet 400 mg  400 mg Oral DAILY    metoprolol succinate (TOPROL-XL) XL tablet 50 mg  50 mg Oral DAILY    valsartan (DIOVAN) tablet 80 mg  80 mg Oral DAILY    sodium chloride (NS) flush 5-10 mL  5-10 mL IntraVENous Q8H    sodium chloride (NS) flush 5-10 mL  5-10 mL IntraVENous PRN    acetaminophen (TYLENOL) tablet 650 mg  650 mg Oral Q6H PRN    ondansetron (ZOFRAN) injection 4 mg  4 mg IntraVENous Q6H PRN    enoxaparin (LOVENOX) injection 90 mg  1 mg/kg SubCUTAneous Q12H    famotidine (PEPCID) tablet 20 mg  20 mg Oral BID         Kasia Johnson MD

## 2018-01-16 NOTE — PROGRESS NOTES
Hospitalist Progress Note    NAME: Neema Salcido   :  1945   MRN:  [id number]       Assessment / Plan:  Atrial fib with RVR, HTN and CKD. Cardiology Inputs and recommendations Appreciated. Hx of cardiomyopathy with EF 30% s/p BiV ICD, last EF normalized  Hx of PAF s/p cardioversion had been in sinus on xarelto and amiodarone  Continue Lovenox .      Weight loss. Intermittent vomiting  GI Inputs and recommendations Appreciated. S/p  Egd/colonoscopy, EGD with esophageal ulcers, colonoscopy with suspected colon cancer   Will follow up with GI plans , oncology surgeon to be on board . CT chest. Needs OP follow up. Centrilobular emphysema, 4.6 cm fusiform ascending aortic aneurysm,  interstitial septal thickening which may reflect mild edema or chronic  interstitial disease.      Acutely elevated LFTs  Hx hepatic steatosis  No abdominal pain  Follow up LFT , slowly trending down. Unremarkable CT through the abdomen and pelvis on 2017. GI on board .     Aspiration with thin liquids on MBS  Nectar thick liquids. S/p  EGD ,Small proximal antral nodule--submucosal  Distal esophageal ulcers ?viral  ,Successful empiric dilation of the ge junction. Pathology is pending, if positive will start antiviral.     Body mass index is 23.21 kg/(m^2). Body mass index is 23.21 kg/(m^2). Code status: Full  Prophylaxis: Lovenox  Recommended Disposition: Home w/Family     Subjective:     Chief Complaint / Reason for Physician Visit   no c/o  Discussed with RN events overnight. Review of Systems:  Symptom Y/N Comments  Symptom Y/N Comments   Fever/Chills n   Chest Pain n    Poor Appetite n   Edema n    Cough n   Abdominal Pain n    Sputum n   Joint Pain n    SOB/GARCIA n   Pruritis/Rash n    Nausea/vomit n   Tolerating PT/OT     Diarrhea n   Tolerating Diet     Constipation n   Other       Could NOT obtain due to:      Objective:     VITALS:   Last 24hrs VS reviewed since prior progress note.  Most recent are:  Patient Vitals for the past 24 hrs:   Temp Pulse Resp BP SpO2   01/16/18 1118 97.5 °F (36.4 °C) 99 18 134/86 98 %   01/16/18 0838 97.9 °F (36.6 °C) (!) 106 18 149/88 96 %   01/16/18 0338 97.9 °F (36.6 °C) (!) 102 18 155/88 97 %   01/15/18 2341 98 °F (36.7 °C) (!) 109 18 (!) 139/91 98 %   01/15/18 2120 98.7 °F (37.1 °C) (!) 105 18 138/77 99 %   01/15/18 2030 - (!) 113 - - -   01/15/18 1833 - (!) 117 - - -   01/15/18 1454 97.5 °F (36.4 °C) 89 16 (!) 141/91 97 %   01/15/18 1353 97 °F (36.1 °C) 92 16 137/90 98 %       Intake/Output Summary (Last 24 hours) at 01/16/18 1350  Last data filed at 01/16/18 0338   Gross per 24 hour   Intake              640 ml   Output                0 ml   Net              640 ml        PHYSICAL EXAM:  General: WD, WN. Alert, cooperative, no acute distress    EENT:  EOMI. Anicteric sclerae. MMM  Resp:  CTA bilaterally, no wheezing or rales. No accessory muscle use  CV:  Regular  rhythm,  No edema  GI:  Soft, Non distended, Non tender.  +Bowel sounds   Neurologic:  Alert and oriented X 3, normal speech, grossly intact  Psych:   Good insight. Not anxious nor agitated  Skin:  No rashes. No jaundice    Reviewed most current lab test results and cultures  YES  Reviewed most current radiology test results   YES  Review and summation of old records today    NO  Reviewed patient's current orders and MAR    YES  PMH/SH reviewed - no change compared to H&P  ________________________________________________________________________  Care Plan discussed with:    Comments   Patient y    Family  y    RN y    Care Manager y    Consultant                        Multidiciplinary team rounds were held today with , nursing, pharmacist and clinical coordinator. Patient's plan of care was discussed; medications were reviewed and discharge planning was addressed.      ________________________________________________________________________  Total NON critical care TIME:  30 Minutes    Total CRITICAL CARE TIME Spent:   Minutes non procedure based      Comments   >50% of visit spent in counseling and coordination of care     ________________________________________________________________________  Jb Silver MD     Procedures: see electronic medical records for all procedures/Xrays and details which were not copied into this note but were reviewed prior to creation of Plan. LABS:  I reviewed today's most current labs and imaging studies.   Pertinent labs include:  Recent Labs      01/15/18   0450  01/14/18   0418   WBC  5.2  5.5   HGB  12.5  11.9*   HCT  37.7  35.5*   PLT  204  207     Recent Labs      01/14/18   0418   NA  141   K  4.0   CL  110*   CO2  25   GLU  95   BUN  18   CREA  0.80   CA  9.0   ALB  2.3*   TBILI  1.2*   SGOT  190*   ALT  183*       Signed: Jb Silver MD

## 2018-01-16 NOTE — PROGRESS NOTES
Bedside shift change report given to Amanda (oncoming nurse) by Wang Lew (offgoing nurse). Report included the following information SBAR, Kardex, Intake/Output, MAR and Recent Results. SHIFT SUMMARY:            [address] NURSING NOTE   Admission Date 1/12/2018   Admission Diagnosis Atrial fibrillation with RVR (HCC)  dysphagia, wt loss   Consults IP CONSULT TO GASTROENTEROLOGY  IP CONSULT TO COLORECTAL SURGERY      Cardiac Monitoring [x] Yes [] No      Purposeful Hourly Rounding [x] Yes    Darius Score Total Score: 2   Darius score 3 or > [] Bed Alarm [] Avasys [] 1:1 sitter [] Patient refused (Place signed refusal form in chart)   Tr Score Tr Score: 21   Tr score 14 or < [] PMT consult [] Wound Care consult    []  Specialty bed  [] Nutrition consult      Influenza Vaccine Received Flu Vaccine for Current Season (usually Sept-March): Yes    Patient/Guardian Refused (Notify MD): No      Oxygen needs?  [x] Room air Oxygen @  []1L    []2L    []3L   []4L    []5L   []6L     Use home O2? [] Yes [x] No  Perform O2 challenge test using  smartphrase (.Homeoxygen)      Last bowel movement Last Bowel Movement Date: 01/15/18      Urinary Catheter             LDAs               Peripheral IV 01/12/18 Right Antecubital (Active)   Site Assessment Clean, dry, & intact 1/15/2018  8:09 PM   Phlebitis Assessment 0 1/15/2018  8:09 PM   Infiltration Assessment 0 1/15/2018  8:09 PM   Dressing Status Clean, dry, & intact 1/15/2018  8:09 PM   Dressing Type Transparent 1/15/2018  8:09 PM   Hub Color/Line Status Capped 1/15/2018  8:09 PM   Action Taken Blood drawn 1/12/2018 11:20 AM       Peripheral IV 01/12/18 Left Forearm (Active)   Site Assessment Clean, dry, & intact 1/15/2018  8:09 PM   Phlebitis Assessment 0 1/15/2018  8:09 PM   Infiltration Assessment 0 1/15/2018  8:09 PM   Dressing Status Clean, dry, & intact 1/15/2018  8:09 PM   Dressing Type Transparent 1/15/2018  8:09 PM   Hub Color/Line Status Capped 1/15/2018  8:09 PM Readmission Risk Assessment Tool Score Low Risk            8       Total Score        3 Has Seen PCP in Last 6 Months (Yes=3, No=0)    5 Pt. Coverage (Medicare=5 , Medicaid, or Self-Pay=4)        Criteria that do not apply:    . Living with Significant Other. Assisted Living. LTAC. SNF.  or   Rehab    Patient Length of Stay (>5 days = 3)    IP Visits Last 12 Months (1-3=4, 4=9, >4=11)    Charlson Comorbidity Score (Age + Comorbid Conditions)       Expected Length of Stay 2d 12h   Actual Length of Stay 3

## 2018-01-16 NOTE — PROGRESS NOTES
0720  Report received from [facility], [address] [place] [address]. SBAR, Kardex, ED Summary, Procedure Summary, Intake/Output, MAR, Accordion, Recent Results, Med Rec Status and Cardiac Rhythm a-fib were discussed. Amanda Barrios      Bedside shift change report given to RAJENDRATAMRALYNETTE (oncoming nurse) by Vipul Mendieta (offgoing nurse). Report included the following information SBAR, Kardex, ED Summary, Procedure Summary, Intake/Output, MAR, Accordion, Recent Results, Med Rec Status and Cardiac Rhythm a-fib. SHIFT SUMMARY:            [facility] NURSING NOTE   Admission Date 1/12/2018   Admission Diagnosis Atrial fibrillation with RVR (HCC)  dysphagia, wt loss  COLON MASS    Consults IP CONSULT TO GASTROENTEROLOGY  IP CONSULT TO COLORECTAL SURGERY      Cardiac Monitoring [x] Yes [] No      Purposeful Hourly Rounding [x] Yes    Darius Score Total Score: 2   Darius score 3 or > [] Bed Alarm [] Avasys [] 1:1 sitter [] Patient refused (Place signed refusal form in chart)   Tr Score Tr Score: 21   Tr score 14 or < [] PMT consult [] Wound Care consult    []  Specialty bed  [] Nutrition consult      Influenza Vaccine Received Flu Vaccine for Current Season (usually Sept-March): Yes    Patient/Guardian Refused (Notify MD): No      Oxygen needs?  [x] Room air Oxygen @  []1L    []2L    []3L   []4L    []5L   []6L     Use home O2? [] Yes [] No  Perform O2 challenge test using  smartphrase (.Homeoxygen)      Last bowel movement Last Bowel Movement Date: 01/16/18      Urinary Catheter             LDAs               Peripheral IV 01/12/18 Right Antecubital (Active)   Site Assessment Clean, dry, & intact 1/16/2018  7:54 PM   Phlebitis Assessment 0 1/16/2018  7:54 PM   Infiltration Assessment 0 1/16/2018  7:54 PM   Dressing Status Clean, dry, & intact 1/16/2018  7:54 PM   Dressing Type Transparent 1/16/2018  7:54 PM   Hub Color/Line Status Pink;Capped 1/16/2018  7:54 PM   Action Taken Blood drawn 1/12/2018 11:20 AM       Peripheral IV 01/12/18 Left Forearm (Active) Site Assessment Clean, dry, & intact 1/16/2018  7:54 PM   Phlebitis Assessment 0 1/16/2018  7:54 PM   Infiltration Assessment 0 1/16/2018  7:54 PM   Dressing Status Clean, dry, & intact 1/16/2018  7:54 PM   Dressing Type Transparent 1/16/2018  7:54 PM   Hub Color/Line Status Pink;Capped 1/16/2018  7:54 PM                         Readmission Risk Assessment Tool Score Low Risk            8       Total Score        3 Has Seen PCP in Last 6 Months (Yes=3, No=0)    5 Pt. Coverage (Medicare=5 , Medicaid, or Self-Pay=4)        Criteria that do not apply:    . Living with Significant Other. Assisted Living. LTAC. SNF.  or   Rehab    Patient Length of Stay (>5 days = 3)    IP Visits Last 12 Months (1-3=4, 4=9, >4=11)    Charlson Comorbidity Score (Age + Comorbid Conditions)       Expected Length of Stay 2d 12h   Actual Length of Stay 4

## 2018-01-17 LAB
ALBUMIN SERPL-MCNC: 2.3 G/DL (ref 3.5–5)
ALBUMIN/GLOB SERPL: 0.7 {RATIO} (ref 1.1–2.2)
ALP SERPL-CCNC: 136 U/L (ref 45–117)
ALT SERPL-CCNC: 176 U/L (ref 12–78)
ANION GAP SERPL CALC-SCNC: 7 MMOL/L (ref 5–15)
AST SERPL-CCNC: 151 U/L (ref 15–37)
BILIRUB DIRECT SERPL-MCNC: 0.6 MG/DL (ref 0–0.2)
BILIRUB SERPL-MCNC: 1.1 MG/DL (ref 0.2–1)
BUN SERPL-MCNC: 23 MG/DL (ref 6–20)
BUN/CREAT SERPL: 25 (ref 12–20)
CALCIUM SERPL-MCNC: 8.7 MG/DL (ref 8.5–10.1)
CEA SERPL-MCNC: <0.5 NG/ML
CHLORIDE SERPL-SCNC: 109 MMOL/L (ref 97–108)
CO2 SERPL-SCNC: 24 MMOL/L (ref 21–32)
CREAT SERPL-MCNC: 0.93 MG/DL (ref 0.7–1.3)
ERYTHROCYTE [DISTWIDTH] IN BLOOD BY AUTOMATED COUNT: 15.4 % (ref 11.5–14.5)
GLOBULIN SER CALC-MCNC: 3.5 G/DL (ref 2–4)
GLUCOSE SERPL-MCNC: 89 MG/DL (ref 65–100)
HCT VFR BLD AUTO: 39.6 % (ref 36.6–50.3)
HGB BLD-MCNC: 12.9 G/DL (ref 12.1–17)
MAGNESIUM SERPL-MCNC: 1.9 MG/DL (ref 1.6–2.4)
MCH RBC QN AUTO: 27.4 PG (ref 26–34)
MCHC RBC AUTO-ENTMCNC: 32.6 G/DL (ref 30–36.5)
MCV RBC AUTO: 84.1 FL (ref 80–99)
PHOSPHATE SERPL-MCNC: 4 MG/DL (ref 2.6–4.7)
PLATELET # BLD AUTO: 221 K/UL (ref 150–400)
POTASSIUM SERPL-SCNC: 4.1 MMOL/L (ref 3.5–5.1)
PROT SERPL-MCNC: 5.8 G/DL (ref 6.4–8.2)
RBC # BLD AUTO: 4.71 M/UL (ref 4.1–5.7)
SODIUM SERPL-SCNC: 140 MMOL/L (ref 136–145)
WBC # BLD AUTO: 7.1 K/UL (ref 4.1–11.1)

## 2018-01-17 PROCEDURE — 74011250637 HC RX REV CODE- 250/637: Performed by: INTERNAL MEDICINE

## 2018-01-17 PROCEDURE — 74011250636 HC RX REV CODE- 250/636: Performed by: SURGERY

## 2018-01-17 PROCEDURE — 82378 CARCINOEMBRYONIC ANTIGEN: CPT | Performed by: SURGERY

## 2018-01-17 PROCEDURE — 65660000000 HC RM CCU STEPDOWN

## 2018-01-17 PROCEDURE — 80076 HEPATIC FUNCTION PANEL: CPT | Performed by: INTERNAL MEDICINE

## 2018-01-17 PROCEDURE — 36415 COLL VENOUS BLD VENIPUNCTURE: CPT | Performed by: INTERNAL MEDICINE

## 2018-01-17 PROCEDURE — 74011250637 HC RX REV CODE- 250/637: Performed by: SURGERY

## 2018-01-17 PROCEDURE — 85027 COMPLETE CBC AUTOMATED: CPT | Performed by: INTERNAL MEDICINE

## 2018-01-17 PROCEDURE — 83735 ASSAY OF MAGNESIUM: CPT | Performed by: INTERNAL MEDICINE

## 2018-01-17 PROCEDURE — 84100 ASSAY OF PHOSPHORUS: CPT | Performed by: INTERNAL MEDICINE

## 2018-01-17 PROCEDURE — 74011250637 HC RX REV CODE- 250/637: Performed by: HOSPITALIST

## 2018-01-17 PROCEDURE — 80048 BASIC METABOLIC PNL TOTAL CA: CPT | Performed by: INTERNAL MEDICINE

## 2018-01-17 RX ORDER — NEOMYCIN SULFATE 500 MG/1
1000 TABLET ORAL ONCE
Status: COMPLETED | OUTPATIENT
Start: 2018-01-17 | End: 2018-01-17

## 2018-01-17 RX ORDER — HEPARIN SODIUM 5000 [USP'U]/ML
5000 INJECTION, SOLUTION INTRAVENOUS; SUBCUTANEOUS EVERY 12 HOURS
Status: DISCONTINUED | OUTPATIENT
Start: 2018-01-17 | End: 2018-01-23 | Stop reason: HOSPADM

## 2018-01-17 RX ORDER — LOSARTAN POTASSIUM 100 MG/1
100 TABLET ORAL DAILY
COMMUNITY
End: 2018-01-23

## 2018-01-17 RX ORDER — METRONIDAZOLE 250 MG/1
500 TABLET ORAL 3 TIMES DAILY
Status: COMPLETED | OUTPATIENT
Start: 2018-01-17 | End: 2018-01-17

## 2018-01-17 RX ADMIN — NEOMYCIN SULFATE 1000 MG: 500 TABLET ORAL at 21:01

## 2018-01-17 RX ADMIN — METRONIDAZOLE 500 MG: 250 TABLET ORAL at 16:54

## 2018-01-17 RX ADMIN — FAMOTIDINE 20 MG: 20 TABLET, FILM COATED ORAL at 10:35

## 2018-01-17 RX ADMIN — Medication 10 ML: at 21:01

## 2018-01-17 RX ADMIN — ENOXAPARIN SODIUM 90 MG: 100 INJECTION SUBCUTANEOUS at 05:04

## 2018-01-17 RX ADMIN — METRONIDAZOLE 500 MG: 250 TABLET ORAL at 22:16

## 2018-01-17 RX ADMIN — NEOMYCIN SULFATE 1000 MG: 500 TABLET ORAL at 15:24

## 2018-01-17 RX ADMIN — Medication 10 ML: at 05:05

## 2018-01-17 RX ADMIN — AMIODARONE HYDROCHLORIDE 400 MG: 200 TABLET ORAL at 10:35

## 2018-01-17 RX ADMIN — METRONIDAZOLE 500 MG: 250 TABLET ORAL at 10:35

## 2018-01-17 RX ADMIN — Medication 10 ML: at 15:24

## 2018-01-17 RX ADMIN — METOPROLOL SUCCINATE 50 MG: 50 TABLET, EXTENDED RELEASE ORAL at 10:35

## 2018-01-17 RX ADMIN — NEOMYCIN SULFATE 1000 MG: 500 TABLET ORAL at 16:54

## 2018-01-17 RX ADMIN — DILTIAZEM HYDROCHLORIDE 180 MG: 180 CAPSULE, COATED, EXTENDED RELEASE ORAL at 10:35

## 2018-01-17 RX ADMIN — HEPARIN SODIUM 5000 UNITS: 5000 INJECTION, SOLUTION INTRAVENOUS; SUBCUTANEOUS at 21:01

## 2018-01-17 RX ADMIN — FAMOTIDINE 20 MG: 20 TABLET, FILM COATED ORAL at 18:48

## 2018-01-17 RX ADMIN — VALSARTAN 80 MG: 40 TABLET, FILM COATED ORAL at 10:35

## 2018-01-17 NOTE — PROGRESS NOTES
Progress Note      1/17/2018 7:18 PM  NAME: Noemy Mondragon   MRN:  [id number]   Admit Diagnosis: Atrial fibrillation with RVR ([facility] [address]  dysphagia, wt loss  COLON MASS      Assessment:     1. Weight loss, difficulty swallowing, CT of abd/pelvis unrevealing, after bowel prep found to be in Afib with RVR  2. Stress 2014 with no ischemia  3. Hx of cardiomyopathy with EF 30% s/p BiV ICD, last EF normalized  4. Hx of PAF s/p cardioversion had been in sinus on xarelto and amiodarone  5. HTN  6. CKD  7. S/p bilteral Knee replacement, hx of prosthesis infection    Severe weight loss, >40lbs, difficulty swallowing, nausea, undergoing GI evaluation. Here to have EGD and Colonoscopy. Found to be in afib with RVR  with dyspnea. Procedures canceled and pt admitted for further management. 6. 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 Hared to tell . In any case the rate is controlled. OK to proceed with studies tomorrow. 1/15 Endoscopy shows ulceration of the distal esophagus. Colonoscopy showed a mass in the descending Colon which is c/w colon CA. Was biopsied. For colorectal consult. Back in A fib. Rate is controlled. Continue on lovenox and transition back to Xarelto when OK     1/16 HR is still up a little. Add Cardizem for rate control      1/17 Seen in AM on rounds . palns for surgery noted . Rate controlled. Still in A fib . Can use IV Cardizem if needed for rate control robles op. Plan:     Continue on meds. [x]        High complexity decision making was performed    Subjective:     Noemy Mondragon denies chest pain, dyspnea. Discussed with RN events overnight.      Patient Active Problem List   Diagnosis Code    Broken prosthetic joint implant ([facility] [address] T84.019A    Infection of prosthetic knee joint ([facility] [address] T84.59XA, Z96.659    Infection and inflammatory reaction due to internal joint prosthesis ([facility]) T84.50XA    DJD (degenerative joint disease) of knee M17.10    Atrial fibrillation with RVR ([facility]) I48.91    Weight loss R63.4       Review of Systems:    Symptom Y/N Comments  Symptom Y/N Comments   Fever/Chills N   Chest Pain N    Poor Appetite N   Edema N    Cough N   Abdominal Pain N    Sputum N   Joint Pain N    SOB/GARCIA N   Pruritis/Rash N    Nausea/vomit N   Tolerating PT/OT Y    Diarrhea N   Tolerating Diet Y    Constipation N   Other       Could NOT obtain due to:      Objective:      Physical Exam:    Last 24hrs VS reviewed since prior progress note. Most recent are:    Visit Vitals    /63 (BP 1 Location: Right arm)    Pulse 77    Temp 98.1 °F (36.7 °C)    Resp 18    Ht 6' 4\" (1.93 m)    Wt 86.5 kg (190 lb 11.2 oz)    SpO2 97%    BMI 23.21 kg/m2       Intake/Output Summary (Last 24 hours) at 01/17/18 1651  Last data filed at 01/17/18 0515   Gross per 24 hour   Intake              500 ml   Output                0 ml   Net              500 ml        General Appearance: Well developed, well nourished, alert & oriented x 3,    no acute distress. Ears/Nose/Mouth/Throat: Hearing grossly normal.  Neck: Supple. Chest: Lungs clear to auscultation bilaterally. Cardiovascular: Regular rate and rhythm, S1S2 normal, no murmur. Abdomen: Soft, non-tender, bowel sounds are active. Extremities: No edema bilaterally. Skin: Warm and dry.     PMH/SH reviewed - no change compared to H&P    Data Review    Telemetry: normal sinus rhythm     Lab Data Personally Reviewed:    Recent Labs      01/17/18   0306  01/15/18   0450   WBC  7.1  5.2   HGB  12.9  12.5   HCT  39.6  37.7   PLT  221  204   LABRCNT(INR:3,PTP:3,APTT:3,)  Recent Labs      01/17/18   0306   NA  140   K  4.1   CL  109*   CO2  24   BUN  23*   CREA  0.93   GLU  89   CA  8.7   MG  1.9   LABRCNT(CPK:3,CpKMB:3,ckndx:3,troiq:3)No results found for: CHOL, CHOLX, CHLST, CHOLV, HDL, LDL, LDLC, DLDLP, TGLX, TRIGL, TRIGP, CHHD, CHHDXLABRCNT(sgot:3,gpt:3,ap:3,tbiL:3,TP:3,ALB:3,GLOB:3,ggt:3,aml:3,amyp:3,lpse:3,hlpse:3)No results for input(s): PH, PCO2, PO2 in the last 72 hours. No results found for: CHOL, CHOLX, CHLST, CHOLV, HDL, LDL, LDLC, DLDLP, TGLX, TRIGL, TRIGP, CHHD, CHHDXMEDTABLETimmali Pozo MD  No results for input(s): PH, PCO2, PO2 in the last 72 hours.     Medications Personally Reviewed:    Current Facility-Administered Medications   Medication Dose Route Frequency    neomycin (MYCIFRADIN) tablet 1,000 mg  1,000 mg Oral ONCE    neomycin (MYCIFRADIN) tablet 1,000 mg  1,000 mg Oral ONCE    metroNIDAZOLE (FLAGYL) tablet 500 mg  500 mg Oral TID    heparin (porcine) injection 5,000 Units  5,000 Units SubCUTAneous Q12H    dilTIAZem CD (CARDIZEM CD) capsule 180 mg  180 mg Oral DAILY    amiodarone (CORDARONE) tablet 400 mg  400 mg Oral DAILY    metoprolol succinate (TOPROL-XL) XL tablet 50 mg  50 mg Oral DAILY    valsartan (DIOVAN) tablet 80 mg  80 mg Oral DAILY    sodium chloride (NS) flush 5-10 mL  5-10 mL IntraVENous Q8H    sodium chloride (NS) flush 5-10 mL  5-10 mL IntraVENous PRN    acetaminophen (TYLENOL) tablet 650 mg  650 mg Oral Q6H PRN    ondansetron (ZOFRAN) injection 4 mg  4 mg IntraVENous Q6H PRN    famotidine (PEPCID) tablet 20 mg  20 mg Oral BID         Yadiel Shearer MD

## 2018-01-17 NOTE — PROGRESS NOTES
Nutrition Assessment:    INTERVENTIONS/RECOMMENDATIONS:   Diet progression per GI or CRS, recommend low fiber   Continue ensure clear TID    ASSESSMENT:   Chart reviewed, noted for planned surgery tomorrow. Pt continues on a clear liquid diet but is eating well. He is consuming ensure clear TID to added protein until his diet is advanced. Will follow up after surgery, likely need low fiber diet post op    Diet Order: Clear liquids  % Eaten:  No data found. Pertinent Medications: [x]Reviewed: pepcid, flagyl  Pertinent Labs: [x]Reviewed: elevated LFTs and T.bili  Food Allergies: [x]NKFA  []Other   Last BM:  1/17  Edema:      []RUE   []LUE   []RLE   []LLE      Pressure Ulcer:      [] Stage I   [] Stage II   [] Stage III   [] Stage IV      Anthropometrics: Height: 6' 4\" (193 cm) Weight: 86.5 kg (190 lb 11.2 oz)    IBW (%IBW):   ( ) UBW (%UBW):   (  %)    BMI: Body mass index is 23.21 kg/(m^2). This BMI is indicative of:  []Underweight   [x]Normal   []Overweight   [] Obesity   [] Extreme Obesity (BMI>40)  Last Weight Metrics:  Weight Loss Metrics 1/12/2018 3/23/2015 3/19/2015 3/10/2015 2/23/2015 1/29/2015 1/15/2015   Today's Wt 190 lb 11.2 oz 223 lb 223 lb 6 oz 234 lb 225 lb 234 lb 234 lb 14.4 oz   BMI 23.21 kg/m2 27.16 kg/m2 27.2 kg/m2 28.5 kg/m2 27.4 kg/m2 28.5 kg/m2 -       Estimated Nutrition Needs (Based on): 2225 Kcals/day (BMR: 1710 x 1.3) , 105 g (1.2 g/kg) Protein  Carbohydrate: At Least 130 g/day  Fluids: 2225 mL/day or per primary team    NUTRITION DIAGNOSES:   Problem:  Inadequate protein-energy intake      Etiology: related to vomiting and dysphagia     Signs/Symptoms: as evidenced by 17% wt loss x 5 months      NUTRITION INTERVENTIONS:  Meals/Snacks: General/healthful diet   Supplements: Commercial supplement              GOAL:   consume >50% of meals and ONS in 2-4 days    NUTRITION MONITORING AND EVALUATION      Food/Nutrient Intake Outcomes:  Total energy intake  Physical Signs/Symptoms Outcomes: Weight/weight change, Growth pattern, Electrolyte and renal profile, GI    Previous Goal Met:   [x] Met              [] Progressing Towards Goal              [] Not Progressing Towards Goal   Previous Recommendations:   [x] Implemented          [] Not Implemented          [] Not Applicable    LEARNING NEEDS (Diet, Food/Nutrient-Drug Interaction):    [x] None Identified   [] Identified and Education Provided/Documented   [] Identified and Pt declined/was not appropriate     Cultural, Sikh, OR Ethnic Dietary Needs:    [x] None Identified   [] Identified and Addressed     [x] Interdisciplinary Care Plan Reviewed/Documented    [x] Discharge Planning: low fiber diet    [] Participated in Interdisciplinary Rounds    NUTRITION RISK:    [x] High              [] Moderate           []  Low  []  Minimal/Uncompromised      Saad Dodson RDN  Pager [phone]  Weekend Pager [phone]

## 2018-01-17 NOTE — PROGRESS NOTES
Pharmacy Medication Reconciliation     The patient was interviewed regarding current PTA medication list, use and drug allergies. The patient was questioned regarding use of any other inhalers, topical products, over the counter medications, herbal medications, vitamin products or ophthalmic/nasal/otic medication use. Allergy Update: NKA    Recommendations/Findings: The following amendments were made to the patient's active medication list on file at [facility]:   1) Additions: Probiotic daily     2) Deletions:famotidine 20 mg twice daily    3) Changes: lostartan-hctz changed to losartan 100 mg every day       -Clarified PTA med list with patient. PTA medication list was corrected to the following:     Prior to Admission Medications   Prescriptions Last Dose Informant Patient Reported? Taking? B.infantis-B.ani-B.long-B.bifi (PROBIOTIC 4X) 10-15 mg TbEC Unknown at Unknown time  Yes No   Sig: Take 1 Tab by mouth daily. amiodarone (PACERONE) 400 mg tablet   No Yes   Sig: Take 1 Tab by mouth daily. furosemide (LASIX) 40 mg tablet   No Yes   Sig: DO NOT TAKE FOR BLOOD PRESSURE UNDER 130/80   losartan (COZAAR) 100 mg tablet Unknown at Unknown time  Yes No   Sig: Take 100 mg by mouth daily. metoprolol succinate (TOPROL-XL) 50 mg XL tablet   Yes Yes   Sig: Take 50 mg by mouth daily. potassium chloride SA (MICRO-K) 10 mEq capsule   Yes Yes   Sig: Take 10 mEq by mouth daily. rivaroxaban (XARELTO) 20 mg tab tablet   Yes Yes   Sig: Take 20 mg by mouth daily.       Facility-Administered Medications: None          Thank you,  Sharath [employer]

## 2018-01-17 NOTE — PROGRESS NOTES
Hospitalist Progress Note    NAME: Nemesio Dallas   :  1945   MRN:  [id number]       Assessment / Plan:  Atrial fib with RVR, HTN and CKD. Cardiology Inputs and recommendations Appreciated. Hx of cardiomyopathy with EF 30% s/p BiV ICD, last EF normalized  Hx of PAF s/p cardioversion had been in sinus on xarelto and amiodarone  holding Lovenox for surgery .      Weight loss. Intermittent vomiting  GI Inputs and recommendations Appreciated. S/p  Egd/colonoscopy, EGD with esophageal ulcers, colonoscopy with suspected colon cancer   Path reports reviewed. For surgery in am      CT chest. Needs OP follow up. Centrilobular emphysema, 4.6 cm fusiform ascending aortic aneurysm,  interstitial septal thickening which may reflect mild edema or chronic  interstitial disease.      Acutely elevated LFTs  Hx hepatic steatosis  No abdominal pain  Follow up LFT , slowly trending down. Unremarkable CT through the abdomen and pelvis on 2017. GI on board .     Aspiration with thin liquids on MBS  Nectar thick liquids. S/p  EGD ,Small proximal antral nodule--submucosal  Distal esophageal ulcers,Successful empiric dilation of the ge junction.      Body mass index is 23.21 kg/(m^2). Body mass index is 23.21 kg/(m^2). Code status: Full  Prophylaxis: Lovenox  Recommended Disposition: Home w/Family     Subjective:     Chief Complaint / Reason for Physician Visit   no c/o  Discussed with RN events overnight. Review of Systems:  Symptom Y/N Comments  Symptom Y/N Comments   Fever/Chills n   Chest Pain n    Poor Appetite n   Edema n    Cough n   Abdominal Pain n    Sputum n   Joint Pain n    SOB/GARCIA n   Pruritis/Rash n    Nausea/vomit n   Tolerating PT/OT     Diarrhea n   Tolerating Diet     Constipation n   Other       Could NOT obtain due to:      Objective:     VITALS:   Last 24hrs VS reviewed since prior progress note.  Most recent are:  Patient Vitals for the past 24 hrs:   Temp Pulse Resp BP SpO2 01/17/18 1231 97.8 °F (36.6 °C) 88 16 119/81 96 %   01/17/18 0831 97.5 °F (36.4 °C) 87 20 128/68 95 %   01/17/18 0400 97.3 °F (36.3 °C) 84 20 136/79 98 %   01/16/18 2313 98.5 °F (36.9 °C) 92 18 139/76 96 %   01/16/18 1937 97.6 °F (36.4 °C) 81 18 113/62 98 %   01/16/18 1633 98.2 °F (36.8 °C) 100 18 120/76 100 %       Intake/Output Summary (Last 24 hours) at 01/17/18 1453  Last data filed at 01/17/18 0515   Gross per 24 hour   Intake              500 ml   Output                0 ml   Net              500 ml        PHYSICAL EXAM:  General: WD, WN. Alert, cooperative, no acute distress    EENT:  EOMI. Anicteric sclerae. MMM  Resp:  CTA bilaterally, no wheezing or rales. No accessory muscle use  CV:  Regular  rhythm,  No edema  GI:  Soft, Non distended, Non tender.  +Bowel sounds   Neurologic:  Alert and oriented X 3, normal speech, grossly intact  Psych:   Good insight. Not anxious nor agitated  Skin:  No rashes. No jaundice    Reviewed most current lab test results and cultures  YES  Reviewed most current radiology test results   YES  Review and summation of old records today    NO  Reviewed patient's current orders and MAR    YES  PMH/SH reviewed - no change compared to H&P  ________________________________________________________________________  Care Plan discussed with:    Comments   Patient y    Family  y    RN y    Care Manager y    Consultant                        Multidiciplinary team rounds were held today with , nursing, pharmacist and clinical coordinator. Patient's plan of care was discussed; medications were reviewed and discharge planning was addressed.      ________________________________________________________________________  Total NON critical care TIME:  30   Minutes    Total CRITICAL CARE TIME Spent:   Minutes non procedure based      Comments   >50% of visit spent in counseling and coordination of care ________________________________________________________________________  Sushant Cantu MD     Procedures: see electronic medical records for all procedures/Xrays and details which were not copied into this note but were reviewed prior to creation of Plan. LABS:  I reviewed today's most current labs and imaging studies.   Pertinent labs include:  Recent Labs      01/17/18   0306  01/15/18   0450   WBC  7.1  5.2   HGB  12.9  12.5   HCT  39.6  37.7   PLT  221  204     Recent Labs      01/17/18   0306   NA  140   K  4.1   CL  109*   CO2  24   GLU  89   BUN  23*   CREA  0.93   CA  8.7   MG  1.9   PHOS  4.0   ALB  2.3*   TBILI  1.1*   SGOT  151*   ALT  176*       Signed: Sushant Cantu MD

## 2018-01-17 NOTE — CARDIO/PULMONARY
CP Rehab Note: chart review     Admit: Weight loss, difficulty swallowing, CT of abd/pelvis unrevealing, after bowel prep found to be in Afib with RVR     Mhx:      1. Stress 2014 with no ischemia  2. Hx of cardiomyopathy with EF 30% s/p BiV ICD, last EF normalized  3. Hx of PAF s/p cardioversion had been in sinus on xarelto and amiodarone  4. HTN  5. CKD  6. S/p bilteral Knee replacement, hx of prosthesis infection  7. , helps care for son with CVA, mild functional capacity     Former smoker.     Cardiology note yesterday:  1/14 appears to be sinus with PACs on tele. Hared to tell . In any case the rate is controlled. OK to proceed with studies tomorrow.    1/15 Endoscopy shows ulceration of the distal esophagus. Colonoscopy showed a mass in the descending Colon which is c/w colon CA. Was biopsied. For colorectal consult. Back in A fib. Rate is controlled. Continue on lovenox and transition back to Xarelto when OK   1/16 HR is still up a little. Add Cardizem for rate control     Former smoker.     Colorectal surgery consult yesterday:  Plan: 72M presented with rapid afib, dysphagia, and weight loss. Found to have a likely malignant lesion in the proximal descending colon / splenic flexure     -CEA tomorrow  -Follow up biopsies  -Surgery scheduled for Thursday  -Keep on clears. NPO after midnight tomorrow night  -Stop lovenox after tomorrow morning's dose    CP Rehab will follow and see when indicated.

## 2018-01-17 NOTE — PROGRESS NOTES
Bedside shift change report given to Amanda (oncoming nurse) by Velasquez Mckeon (offgoing nurse). Report included the following information SBAR, Kardex, Intake/Output, MAR and Recent Results. SHIFT SUMMARY:            [address] NURSING NOTE   Admission Date 1/12/2018   Admission Diagnosis Atrial fibrillation with RVR (HCC)  dysphagia, wt loss  COLON MASS    Consults IP CONSULT TO GASTROENTEROLOGY  IP CONSULT TO COLORECTAL SURGERY      Cardiac Monitoring [x] Yes [] No      Purposeful Hourly Rounding [x] Yes    Darius Score Total Score: 2   Darius score 3 or > [] Bed Alarm [] [employer] [] 1:1 sitter [] Patient refused (Place signed refusal form in chart)   Tr Score Tr Score: 21   Tr score 14 or < [] PMT consult [] Wound Care consult    []  Specialty bed  [] Nutrition consult      Influenza Vaccine Received Flu Vaccine for Current Season (usually Sept-March): Yes    Patient/Guardian Refused (Notify MD): No      Oxygen needs?  [x] Room air Oxygen @  []1L    []2L    []3L   []4L    []5L   []6L     Use home O2? [] Yes [x] No  Perform O2 challenge test using  smartphrase (.Homeoxygen)      Last bowel movement Last Bowel Movement Date: 01/15/18      Urinary Catheter             LDAs               Peripheral IV 01/12/18 Right Antecubital (Active)   Site Assessment Clean, dry, & intact 1/16/2018  7:54 PM   Phlebitis Assessment 0 1/16/2018  7:54 PM   Infiltration Assessment 0 1/16/2018  7:54 PM   Dressing Status Clean, dry, & intact 1/16/2018  7:54 PM   Dressing Type Transparent 1/16/2018  7:54 PM   Hub Color/Line Status Pink;Capped 1/16/2018  7:54 PM   Action Taken Blood drawn 1/12/2018 11:20 AM       Peripheral IV 01/12/18 Left Forearm (Active)   Site Assessment Clean, dry, & intact 1/16/2018  7:54 PM   Phlebitis Assessment 0 1/16/2018  7:54 PM   Infiltration Assessment 0 1/16/2018  7:54 PM   Dressing Status Clean, dry, & intact 1/16/2018  7:54 PM   Dressing Type Transparent 1/16/2018  7:54 PM   Hub Color/Line Status Pink;Capped 1/16/2018  7:54 PM                         Readmission Risk Assessment Tool Score Low Risk            8       Total Score        3 Has Seen PCP in Last 6 Months (Yes=3, No=0)    5 Pt. Coverage (Medicare=5 , Medicaid, or Self-Pay=4)        Criteria that do not apply:    . Living with Significant Other. Assisted Living. LTAC. SNF.  or   Rehab    Patient Length of Stay (>5 days = 3)    IP Visits Last 12 Months (1-3=4, 4=9, >4=11)    Charlson Comorbidity Score (Age + Comorbid Conditions)       Expected Length of Stay 2d 12h   Actual Length of Stay 4

## 2018-01-17 NOTE — PROGRESS NOTES
F/U for esophgeal ulcer  Anemia  Weight loss  Colon mass    S: Mr. Hiram French was seen by me today during rounds. At this time, he is resting + comfortably. Dysphagia is gone. No other symptoms. The patient has no new complaints today. Please see admission consult for details of ROS; there are no changes today. O: Blood pressure 128/68, pulse 87, temperature 97.5 °F (36.4 °C), resp. rate 20, height 6' 4\" (1.93 m), weight 86.5 kg (190 lb 11.2 oz), SpO2 95 %. Gen: Patient is in no acute distress. There is no jaundice. bilaterally . RRR. Abd: Soft, nontender, non-distended, . Extremities: Warm. Lab Results   Component Value Date/Time    WBC 7.1 01/17/2018 03:06 AM    Hemoglobin (POC) 12.6 01/12/2015 02:27 PM    HGB 12.9 01/17/2018 03:06 AM    Hematocrit (POC) 37 01/12/2015 02:27 PM    HCT 39.6 01/17/2018 03:06 AM    PLATELET 153 01/29/1018 03:06 AM    MCV 84.1 01/17/2018 03:06 AM     Lab Results   Component Value Date/Time    Sodium 140 01/17/2018 03:06 AM    Potassium 4.1 01/17/2018 03:06 AM    Chloride 109 01/17/2018 03:06 AM    CO2 24 01/17/2018 03:06 AM    Anion gap 7 01/17/2018 03:06 AM    Glucose 89 01/17/2018 03:06 AM    BUN 23 01/17/2018 03:06 AM    Creatinine 0.93 01/17/2018 03:06 AM    BUN/Creatinine ratio 25 01/17/2018 03:06 AM    GFR est AA >60 01/17/2018 03:06 AM    GFR est non-AA >60 01/17/2018 03:06 AM    Calcium 8.7 01/17/2018 03:06 AM    Bilirubin, total 1.1 01/17/2018 03:06 AM    AST (SGOT) 151 01/17/2018 03:06 AM    Alk. phosphatase 136 01/17/2018 03:06 AM    Protein, total 5.8 01/17/2018 03:06 AM    Albumin 2.3 01/17/2018 03:06 AM    Globulin 3.5 01/17/2018 03:06 AM    A-G Ratio 0.7 01/17/2018 03:06 AM    ALT (SGPT) 176 01/17/2018 03:06 AM      Cross sectional imaging:  None new       Bx:     FINAL PATHOLOGIC DIAGNOSIS   1. Duodenum, biopsy:   Mild chronic duodenitis with mild blunting of villi or increased intraepithelial lymphocytes.    2. Gastric, biopsy:   Mild chronic superficial gastritis. Immunohistochemical stain for H. pylori is negative. 3. Distal esophagus, biopsy:   Mild chronic esophagitis with features suggestive of reflux. Gastric junctional mucosa; negative for specialized metaplastic epithelium and dysplasia. 4. Proximal gastric antral nodule, biopsy:   Mild chronic superficial gastritis and foveolar hyperplasia. Immunohistochemical stain for H. pylori is negative. 5. Cecal colon polyp, biopsy:   Tubular adenoma (multiple fragments). 6. Proximal transverse colon polyp, biopsy:   Tubular adenoma (multiple fragments). 7. 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 Electronically Signed Out By Sherice German M.D. A: Principal Problem:    Atrial fibrillation with RVR ([address] (1/12/2018)    Active Problems:    Weight loss (1/12/2018)        Comment:  Dysphagia is better  P:  I would rx the esohageal ulcer with ppi.  bx not concerning  Surgery tomorrow. Even though no invasive cancer on biopsy, this is a large lesion and there is is high grade dysplasia.       I will follow    Pilar Castillo MD  3:38 PM  1/17/2018

## 2018-01-17 NOTE — PROGRESS NOTES
Problem: Falls - Risk of  Goal: *Absence of Falls  Document Darius Fall Risk and appropriate interventions in the flowsheet. Outcome: Progressing Towards Goal  Fall Risk Interventions:  Mobility Interventions: Bed/chair exit alarm, Communicate number of staff needed for ambulation/transfer, Mechanical lift, OT consult for ADLs, Patient to call before getting OOB, PT Consult for assist device competence         Medication Interventions: Bed/chair exit alarm, Evaluate medications/consider consulting pharmacy, Patient to call before getting OOB, Teach patient to arise slowly    Elimination Interventions: Call light in reach, Elevated toilet seat, Patient to call for help with toileting needs, Toilet paper/wipes in reach, Toileting schedule/hourly rounds, Urinal in reach    History of Falls Interventions:  Investigate reason for fall

## 2018-01-17 NOTE — PROGRESS NOTES
720  Report received from Houston Niño [address] [facility] [place], ED Summary, Procedure Summary, Intake/Output, MAR, Accordion, Recent Results, Med Rec Status and Cardiac Rhythm a-fib were discussed. Amanda Barrios        Bedside shift change report given to Ihsan Persaud (oncoming nurse) by Kat Pittman (offgoing nurse). Report included the following information SBAR, Kardex, ED Summary, Procedure Summary, Intake/Output, MAR, Accordion, Recent Results, Med Rec Status and Cardiac Rhythm a-fib. SHIFT SUMMARY:            [address] [facility]   Admission Date 1/12/2018   Admission Diagnosis Atrial fibrillation with RVR (HCC)  dysphagia, wt loss  COLON MASS    Consults IP CONSULT TO GASTROENTEROLOGY  IP CONSULT TO COLORECTAL SURGERY      Cardiac Monitoring [x] Yes [] No      Purposeful Hourly Rounding [x] Yes    Darius Score Total Score: 2   Darius score 3 or > [] Bed Alarm [] Avasys [] 1:1 sitter [] Patient refused (Place signed refusal form in chart)   Tr Score Tr Score: 21   Tr score 14 or < [] PMT consult [] Wound Care consult    []  Specialty bed  [] Nutrition consult      Influenza Vaccine Received Flu Vaccine for Current Season (usually Sept-March): Yes    Patient/Guardian Refused (Notify MD): No      Oxygen needs?  [x] Room air Oxygen @  []1L    []2L    []3L   []4L    []5L   []6L     Use home O2? [] Yes [] No  Perform O2 challenge test using  smartphrase (.Homeoxygen)      Last bowel movement Last Bowel Movement Date: 01/17/18      Urinary Catheter             LDAs               Peripheral IV 01/12/18 Right Antecubital (Active)   Site Assessment Clean, dry, & intact 1/17/2018  4:00 PM   Phlebitis Assessment 0 1/17/2018  4:00 PM   Infiltration Assessment 0 1/17/2018  4:00 PM   Dressing Status Clean, dry, & intact 1/17/2018  4:00 PM   Dressing Type Tape;Transparent 1/17/2018  4:00 PM   Hub Color/Line Status Pink;Flushed 1/17/2018  4:00 PM   Action Taken Blood drawn 1/12/2018 11:20 AM       Peripheral IV 01/12/18 Left Forearm (Active)   Site Assessment Clean, dry, & intact 1/17/2018  4:00 PM   Phlebitis Assessment 0 1/17/2018  4:00 PM   Infiltration Assessment 0 1/17/2018  4:00 PM   Dressing Status Clean, dry, & intact 1/17/2018  4:00 PM   Dressing Type Tape;Transparent 1/17/2018  4:00 PM   Hub Color/Line Status Pink;Flushed 1/17/2018  4:00 PM                         Readmission Risk Assessment Tool Score Low Risk            11       Total Score        3 Has Seen PCP in Last 6 Months (Yes=3, No=0)    3 Patient Length of Stay (>5 days = 3)    5 Pt. Coverage (Medicare=5 , Medicaid, or Self-Pay=4)        Criteria that do not apply:    . Living with Significant Other. Assisted Living. LTAC. SNF.  or   Rehab    IP Visits Last 12 Months (1-3=4, 4=9, >4=11)    Charlson Comorbidity Score (Age + Comorbid Conditions)       Expected Length of Stay 2d 12h   Actual Length of Stay 5

## 2018-01-17 NOTE — PROGRESS NOTES
CRS Progress Note    Doing well. No pain or bleeding    /79 (BP 1 Location: Left arm, BP Patient Position: At rest)  Pulse 84  Temp 97.3 °F (36.3 °C)  Resp 20  Ht 6' 4\" (1.93 m)  Wt 86.5 kg (190 lb 11.2 oz)  SpO2 98%  BMI 23.21 kg/m2    NAD, AAOx3  Abd benign    Path noted    Plan  -Neomycin/flagyl (Oral bowel prep)  -Stopped therapeutic lovenox. Will give heparin sq for prophylaxis in periop period. Will resume therapeutic lovenox postop when safe from bleeding standpoint.  -Surgery planned for tomorrow morning.

## 2018-01-18 ENCOUNTER — ANESTHESIA (OUTPATIENT)
Dept: SURGERY | Age: 73
DRG: 264 | End: 2018-01-18
Payer: MEDICARE

## 2018-01-18 ENCOUNTER — ANESTHESIA EVENT (OUTPATIENT)
Dept: SURGERY | Age: 73
DRG: 264 | End: 2018-01-18
Payer: MEDICARE

## 2018-01-18 LAB
ANION GAP SERPL CALC-SCNC: 6 MMOL/L (ref 5–15)
BUN SERPL-MCNC: 16 MG/DL (ref 6–20)
BUN/CREAT SERPL: 20 (ref 12–20)
CALCIUM SERPL-MCNC: 8.5 MG/DL (ref 8.5–10.1)
CHLORIDE SERPL-SCNC: 110 MMOL/L (ref 97–108)
CO2 SERPL-SCNC: 25 MMOL/L (ref 21–32)
CREAT SERPL-MCNC: 0.8 MG/DL (ref 0.7–1.3)
ERYTHROCYTE [DISTWIDTH] IN BLOOD BY AUTOMATED COUNT: 15.5 % (ref 11.5–14.5)
GLUCOSE BLD STRIP.AUTO-MCNC: 106 MG/DL (ref 65–100)
GLUCOSE SERPL-MCNC: 95 MG/DL (ref 65–100)
HCT VFR BLD AUTO: 37.9 % (ref 36.6–50.3)
HGB BLD-MCNC: 12.4 G/DL (ref 12.1–17)
MAGNESIUM SERPL-MCNC: 1.8 MG/DL (ref 1.6–2.4)
MCH RBC QN AUTO: 27.4 PG (ref 26–34)
MCHC RBC AUTO-ENTMCNC: 32.7 G/DL (ref 30–36.5)
MCV RBC AUTO: 83.7 FL (ref 80–99)
PHOSPHATE SERPL-MCNC: 2.9 MG/DL (ref 2.6–4.7)
PLATELET # BLD AUTO: 218 K/UL (ref 150–400)
POTASSIUM SERPL-SCNC: 3.9 MMOL/L (ref 3.5–5.1)
RBC # BLD AUTO: 4.53 M/UL (ref 4.1–5.7)
SERVICE CMNT-IMP: ABNORMAL
SODIUM SERPL-SCNC: 141 MMOL/L (ref 136–145)
WBC # BLD AUTO: 5.8 K/UL (ref 4.1–11.1)

## 2018-01-18 PROCEDURE — 77030016151 HC PROTCTR LNS DFOG COVD -B: Performed by: SURGERY

## 2018-01-18 PROCEDURE — 77030034628 HC LIGASURE LAP SEAL DIV MD COVD -F: Performed by: SURGERY

## 2018-01-18 PROCEDURE — 77030035220 HC TRCR ENDOSC BLNTPRT ANCHR COVD -B: Performed by: SURGERY

## 2018-01-18 PROCEDURE — 77030019908 HC STETH ESOPH SIMS -A: Performed by: ANESTHESIOLOGY

## 2018-01-18 PROCEDURE — 85027 COMPLETE CBC AUTOMATED: CPT | Performed by: INTERNAL MEDICINE

## 2018-01-18 PROCEDURE — 77030008606 HC TRCR ENDOSC KII AMR -B: Performed by: SURGERY

## 2018-01-18 PROCEDURE — 0DTG0ZZ RESECTION OF LEFT LARGE INTESTINE, OPEN APPROACH: ICD-10-PCS | Performed by: SURGERY

## 2018-01-18 PROCEDURE — 74011250637 HC RX REV CODE- 250/637: Performed by: HOSPITALIST

## 2018-01-18 PROCEDURE — 84100 ASSAY OF PHOSPHORUS: CPT | Performed by: INTERNAL MEDICINE

## 2018-01-18 PROCEDURE — 65660000000 HC RM CCU STEPDOWN

## 2018-01-18 PROCEDURE — 74011250637 HC RX REV CODE- 250/637: Performed by: INTERNAL MEDICINE

## 2018-01-18 PROCEDURE — 77030008684 HC TU ET CUF COVD -B: Performed by: ANESTHESIOLOGY

## 2018-01-18 PROCEDURE — 82962 GLUCOSE BLOOD TEST: CPT

## 2018-01-18 PROCEDURE — C1758 CATHETER, URETERAL: HCPCS | Performed by: SURGERY

## 2018-01-18 PROCEDURE — 77030002933 HC SUT MCRYL J&J -A: Performed by: SURGERY

## 2018-01-18 PROCEDURE — 76010000174 HC OR TIME 3.5 TO 4 HR INTENSV-TIER 1: Performed by: SURGERY

## 2018-01-18 PROCEDURE — 77030012961 HC IRR KT CYSTO/TUR ICUM -A: Performed by: SURGERY

## 2018-01-18 PROCEDURE — 76060000038 HC ANESTHESIA 3.5 TO 4 HR: Performed by: SURGERY

## 2018-01-18 PROCEDURE — 77030010293 HC STPLR INT TI J&J -B: Performed by: SURGERY

## 2018-01-18 PROCEDURE — 77030026438 HC STYL ET INTUB CARD -A: Performed by: ANESTHESIOLOGY

## 2018-01-18 PROCEDURE — 77010033678 HC OXYGEN DAILY

## 2018-01-18 PROCEDURE — 36415 COLL VENOUS BLD VENIPUNCTURE: CPT | Performed by: INTERNAL MEDICINE

## 2018-01-18 PROCEDURE — 77030018832 HC SOL IRR H20 ICUM -A: Performed by: SURGERY

## 2018-01-18 PROCEDURE — 83735 ASSAY OF MAGNESIUM: CPT | Performed by: INTERNAL MEDICINE

## 2018-01-18 PROCEDURE — 77030034848: Performed by: SURGERY

## 2018-01-18 PROCEDURE — 0T778DZ DILATION OF LEFT URETER WITH INTRALUMINAL DEVICE, VIA NATURAL OR ARTIFICIAL OPENING ENDOSCOPIC: ICD-10-PCS | Performed by: UROLOGY

## 2018-01-18 PROCEDURE — 74011250636 HC RX REV CODE- 250/636: Performed by: SURGERY

## 2018-01-18 PROCEDURE — 74011000250 HC RX REV CODE- 250: Performed by: SURGERY

## 2018-01-18 PROCEDURE — 76210000016 HC OR PH I REC 1 TO 1.5 HR: Performed by: SURGERY

## 2018-01-18 PROCEDURE — 74011250637 HC RX REV CODE- 250/637: Performed by: SURGERY

## 2018-01-18 PROCEDURE — 80048 BASIC METABOLIC PNL TOTAL CA: CPT | Performed by: INTERNAL MEDICINE

## 2018-01-18 PROCEDURE — 77030018684: Performed by: SURGERY

## 2018-01-18 PROCEDURE — 77030003029 HC SUT VCRL J&J -B: Performed by: SURGERY

## 2018-01-18 PROCEDURE — 77030009527 HC GEL PRT SYS AMR -E: Performed by: SURGERY

## 2018-01-18 PROCEDURE — 77030008756 HC TU IRR SUC STRY -B: Performed by: SURGERY

## 2018-01-18 PROCEDURE — 74011250636 HC RX REV CODE- 250/636

## 2018-01-18 PROCEDURE — 77030031139 HC SUT VCRL2 J&J -A: Performed by: SURGERY

## 2018-01-18 PROCEDURE — 77030013079 HC BLNKT BAIR HGGR 3M -A: Performed by: ANESTHESIOLOGY

## 2018-01-18 PROCEDURE — 77030020061 HC IV BLD WRMR ADMIN SET 3M -B: Performed by: ANESTHESIOLOGY

## 2018-01-18 PROCEDURE — 88309 TISSUE EXAM BY PATHOLOGIST: CPT | Performed by: SURGERY

## 2018-01-18 PROCEDURE — 74011000250 HC RX REV CODE- 250

## 2018-01-18 PROCEDURE — 77030011640 HC PAD GRND REM COVD -A: Performed by: SURGERY

## 2018-01-18 PROCEDURE — 77030020782 HC GWN BAIR PAWS FLX 3M -B

## 2018-01-18 PROCEDURE — 77030018521 HC STPLR ENDOSCOPIC J&J -C: Performed by: SURGERY

## 2018-01-18 PROCEDURE — 77030002966 HC SUT PDS J&J -A: Performed by: SURGERY

## 2018-01-18 PROCEDURE — 77030018836 HC SOL IRR NACL ICUM -A: Performed by: SURGERY

## 2018-01-18 PROCEDURE — 77030008771 HC TU NG SALEM SUMP -A: Performed by: ANESTHESIOLOGY

## 2018-01-18 RX ORDER — BUPIVACAINE HYDROCHLORIDE AND EPINEPHRINE 2.5; 5 MG/ML; UG/ML
30 INJECTION, SOLUTION EPIDURAL; INFILTRATION; INTRACAUDAL; PERINEURAL ONCE
Status: COMPLETED | OUTPATIENT
Start: 2018-01-18 | End: 2018-01-18

## 2018-01-18 RX ORDER — DIPHENHYDRAMINE HYDROCHLORIDE 50 MG/ML
INJECTION, SOLUTION INTRAMUSCULAR; INTRAVENOUS AS NEEDED
Status: DISCONTINUED | OUTPATIENT
Start: 2018-01-18 | End: 2018-01-18 | Stop reason: HOSPADM

## 2018-01-18 RX ORDER — MIDAZOLAM HYDROCHLORIDE 1 MG/ML
INJECTION, SOLUTION INTRAMUSCULAR; INTRAVENOUS AS NEEDED
Status: DISCONTINUED | OUTPATIENT
Start: 2018-01-18 | End: 2018-01-18 | Stop reason: HOSPADM

## 2018-01-18 RX ORDER — OXYCODONE HYDROCHLORIDE 5 MG/1
5 TABLET ORAL
Status: DISCONTINUED | OUTPATIENT
Start: 2018-01-18 | End: 2018-01-23 | Stop reason: HOSPADM

## 2018-01-18 RX ORDER — HYDROMORPHONE HYDROCHLORIDE 2 MG/ML
INJECTION, SOLUTION INTRAMUSCULAR; INTRAVENOUS; SUBCUTANEOUS AS NEEDED
Status: DISCONTINUED | OUTPATIENT
Start: 2018-01-18 | End: 2018-01-18 | Stop reason: HOSPADM

## 2018-01-18 RX ORDER — ACETAMINOPHEN 10 MG/ML
INJECTION, SOLUTION INTRAVENOUS AS NEEDED
Status: DISCONTINUED | OUTPATIENT
Start: 2018-01-18 | End: 2018-01-18 | Stop reason: HOSPADM

## 2018-01-18 RX ORDER — SODIUM CHLORIDE, SODIUM LACTATE, POTASSIUM CHLORIDE, CALCIUM CHLORIDE 600; 310; 30; 20 MG/100ML; MG/100ML; MG/100ML; MG/100ML
75 INJECTION, SOLUTION INTRAVENOUS CONTINUOUS
Status: DISCONTINUED | OUTPATIENT
Start: 2018-01-18 | End: 2018-01-18 | Stop reason: HOSPADM

## 2018-01-18 RX ORDER — DEXAMETHASONE SODIUM PHOSPHATE 100 MG/10ML
INJECTION INTRAMUSCULAR; INTRAVENOUS AS NEEDED
Status: DISCONTINUED | OUTPATIENT
Start: 2018-01-18 | End: 2018-01-18 | Stop reason: HOSPADM

## 2018-01-18 RX ORDER — LIDOCAINE HYDROCHLORIDE 20 MG/ML
INJECTION, SOLUTION EPIDURAL; INFILTRATION; INTRACAUDAL; PERINEURAL AS NEEDED
Status: DISCONTINUED | OUTPATIENT
Start: 2018-01-18 | End: 2018-01-18 | Stop reason: HOSPADM

## 2018-01-18 RX ORDER — ROCURONIUM BROMIDE 10 MG/ML
INJECTION, SOLUTION INTRAVENOUS AS NEEDED
Status: DISCONTINUED | OUTPATIENT
Start: 2018-01-18 | End: 2018-01-18 | Stop reason: HOSPADM

## 2018-01-18 RX ORDER — ALVIMOPAN 12 MG/1
12 CAPSULE ORAL 2 TIMES DAILY
Status: DISCONTINUED | OUTPATIENT
Start: 2018-01-19 | End: 2018-01-21

## 2018-01-18 RX ORDER — NEOSTIGMINE METHYLSULFATE 1 MG/ML
INJECTION INTRAVENOUS AS NEEDED
Status: DISCONTINUED | OUTPATIENT
Start: 2018-01-18 | End: 2018-01-18 | Stop reason: HOSPADM

## 2018-01-18 RX ORDER — SODIUM CHLORIDE, SODIUM LACTATE, POTASSIUM CHLORIDE, CALCIUM CHLORIDE 600; 310; 30; 20 MG/100ML; MG/100ML; MG/100ML; MG/100ML
INJECTION, SOLUTION INTRAVENOUS
Status: DISCONTINUED | OUTPATIENT
Start: 2018-01-18 | End: 2018-01-18 | Stop reason: HOSPADM

## 2018-01-18 RX ORDER — SUCCINYLCHOLINE CHLORIDE 20 MG/ML
INJECTION INTRAMUSCULAR; INTRAVENOUS AS NEEDED
Status: DISCONTINUED | OUTPATIENT
Start: 2018-01-18 | End: 2018-01-18 | Stop reason: HOSPADM

## 2018-01-18 RX ORDER — ONDANSETRON 2 MG/ML
INJECTION INTRAMUSCULAR; INTRAVENOUS AS NEEDED
Status: DISCONTINUED | OUTPATIENT
Start: 2018-01-18 | End: 2018-01-18 | Stop reason: HOSPADM

## 2018-01-18 RX ORDER — GLYCOPYRROLATE 0.2 MG/ML
INJECTION INTRAMUSCULAR; INTRAVENOUS AS NEEDED
Status: DISCONTINUED | OUTPATIENT
Start: 2018-01-18 | End: 2018-01-18 | Stop reason: HOSPADM

## 2018-01-18 RX ORDER — HYDROMORPHONE HYDROCHLORIDE 2 MG/ML
0.5 INJECTION, SOLUTION INTRAMUSCULAR; INTRAVENOUS; SUBCUTANEOUS
Status: DISCONTINUED | OUTPATIENT
Start: 2018-01-18 | End: 2018-01-20

## 2018-01-18 RX ORDER — OXYCODONE HYDROCHLORIDE 5 MG/1
10 TABLET ORAL
Status: DISCONTINUED | OUTPATIENT
Start: 2018-01-18 | End: 2018-01-23 | Stop reason: HOSPADM

## 2018-01-18 RX ORDER — FENTANYL CITRATE 50 UG/ML
INJECTION, SOLUTION INTRAMUSCULAR; INTRAVENOUS AS NEEDED
Status: DISCONTINUED | OUTPATIENT
Start: 2018-01-18 | End: 2018-01-18 | Stop reason: HOSPADM

## 2018-01-18 RX ORDER — VECURONIUM BROMIDE FOR INJECTION 1 MG/ML
INJECTION, POWDER, LYOPHILIZED, FOR SOLUTION INTRAVENOUS AS NEEDED
Status: DISCONTINUED | OUTPATIENT
Start: 2018-01-18 | End: 2018-01-18 | Stop reason: HOSPADM

## 2018-01-18 RX ORDER — ACETAMINOPHEN 10 MG/ML
INJECTION, SOLUTION INTRAVENOUS AS NEEDED
Status: DISCONTINUED | OUTPATIENT
Start: 2018-01-18 | End: 2018-01-18

## 2018-01-18 RX ORDER — PROPOFOL 10 MG/ML
INJECTION, EMULSION INTRAVENOUS AS NEEDED
Status: DISCONTINUED | OUTPATIENT
Start: 2018-01-18 | End: 2018-01-18 | Stop reason: HOSPADM

## 2018-01-18 RX ORDER — SODIUM CHLORIDE, SODIUM LACTATE, POTASSIUM CHLORIDE, CALCIUM CHLORIDE 600; 310; 30; 20 MG/100ML; MG/100ML; MG/100ML; MG/100ML
25 INJECTION, SOLUTION INTRAVENOUS CONTINUOUS
Status: DISCONTINUED | OUTPATIENT
Start: 2018-01-18 | End: 2018-01-18 | Stop reason: HOSPADM

## 2018-01-18 RX ORDER — CEFOXITIN 2 G/1
INJECTION, POWDER, FOR SOLUTION INTRAVENOUS AS NEEDED
Status: DISCONTINUED | OUTPATIENT
Start: 2018-01-18 | End: 2018-01-18 | Stop reason: HOSPADM

## 2018-01-18 RX ADMIN — Medication 10 ML: at 21:21

## 2018-01-18 RX ADMIN — VECURONIUM BROMIDE FOR INJECTION 3 MG: 1 INJECTION, POWDER, LYOPHILIZED, FOR SOLUTION INTRAVENOUS at 10:00

## 2018-01-18 RX ADMIN — METOPROLOL SUCCINATE 50 MG: 50 TABLET, EXTENDED RELEASE ORAL at 06:06

## 2018-01-18 RX ADMIN — OXYCODONE HYDROCHLORIDE 10 MG: 5 TABLET ORAL at 21:26

## 2018-01-18 RX ADMIN — HYDROMORPHONE HYDROCHLORIDE 0.6 MG: 2 INJECTION, SOLUTION INTRAMUSCULAR; INTRAVENOUS; SUBCUTANEOUS at 08:29

## 2018-01-18 RX ADMIN — VECURONIUM BROMIDE FOR INJECTION 3 MG: 1 INJECTION, POWDER, LYOPHILIZED, FOR SOLUTION INTRAVENOUS at 08:55

## 2018-01-18 RX ADMIN — Medication 10 ML: at 16:03

## 2018-01-18 RX ADMIN — ROCURONIUM BROMIDE 30 MG: 10 INJECTION, SOLUTION INTRAVENOUS at 08:09

## 2018-01-18 RX ADMIN — CEFOXITIN 2 G: 2 INJECTION, POWDER, FOR SOLUTION INTRAVENOUS at 07:48

## 2018-01-18 RX ADMIN — SODIUM CHLORIDE, SODIUM LACTATE, POTASSIUM CHLORIDE, CALCIUM CHLORIDE: 600; 310; 30; 20 INJECTION, SOLUTION INTRAVENOUS at 10:58

## 2018-01-18 RX ADMIN — FENTANYL CITRATE 50 MCG: 50 INJECTION, SOLUTION INTRAMUSCULAR; INTRAVENOUS at 11:13

## 2018-01-18 RX ADMIN — LIDOCAINE HYDROCHLORIDE 20 MG: 20 INJECTION, SOLUTION EPIDURAL; INFILTRATION; INTRACAUDAL; PERINEURAL at 07:34

## 2018-01-18 RX ADMIN — SODIUM CHLORIDE, SODIUM LACTATE, POTASSIUM CHLORIDE, CALCIUM CHLORIDE: 600; 310; 30; 20 INJECTION, SOLUTION INTRAVENOUS at 07:28

## 2018-01-18 RX ADMIN — OXYCODONE HYDROCHLORIDE 5 MG: 5 TABLET ORAL at 16:02

## 2018-01-18 RX ADMIN — SODIUM CHLORIDE, SODIUM LACTATE, POTASSIUM CHLORIDE, AND CALCIUM CHLORIDE 75 ML/HR: 600; 310; 30; 20 INJECTION, SOLUTION INTRAVENOUS at 12:20

## 2018-01-18 RX ADMIN — FENTANYL CITRATE 50 MCG: 50 INJECTION, SOLUTION INTRAMUSCULAR; INTRAVENOUS at 10:01

## 2018-01-18 RX ADMIN — MIDAZOLAM HYDROCHLORIDE 1 MG: 1 INJECTION, SOLUTION INTRAMUSCULAR; INTRAVENOUS at 07:26

## 2018-01-18 RX ADMIN — FAMOTIDINE 20 MG: 20 TABLET, FILM COATED ORAL at 17:32

## 2018-01-18 RX ADMIN — HYDROMORPHONE HYDROCHLORIDE 0.4 MG: 2 INJECTION, SOLUTION INTRAMUSCULAR; INTRAVENOUS; SUBCUTANEOUS at 08:03

## 2018-01-18 RX ADMIN — SUCCINYLCHOLINE CHLORIDE 180 MG: 20 INJECTION INTRAMUSCULAR; INTRAVENOUS at 07:34

## 2018-01-18 RX ADMIN — DEXAMETHASONE SODIUM PHOSPHATE 8 MG: 100 INJECTION INTRAMUSCULAR; INTRAVENOUS at 07:48

## 2018-01-18 RX ADMIN — DIPHENHYDRAMINE HYDROCHLORIDE 25 MG: 50 INJECTION, SOLUTION INTRAMUSCULAR; INTRAVENOUS at 09:58

## 2018-01-18 RX ADMIN — GLYCOPYRROLATE 0.5 MG: 0.2 INJECTION INTRAMUSCULAR; INTRAVENOUS at 10:39

## 2018-01-18 RX ADMIN — ACETAMINOPHEN 500 MG: 10 INJECTION, SOLUTION INTRAVENOUS at 10:09

## 2018-01-18 RX ADMIN — NEOSTIGMINE METHYLSULFATE 3 MG: 1 INJECTION INTRAVENOUS at 10:39

## 2018-01-18 RX ADMIN — SODIUM CHLORIDE, SODIUM LACTATE, POTASSIUM CHLORIDE, CALCIUM CHLORIDE: 600; 310; 30; 20 INJECTION, SOLUTION INTRAVENOUS at 08:30

## 2018-01-18 RX ADMIN — ROCURONIUM BROMIDE 10 MG: 10 INJECTION, SOLUTION INTRAVENOUS at 07:34

## 2018-01-18 RX ADMIN — MIDAZOLAM HYDROCHLORIDE 1 MG: 1 INJECTION, SOLUTION INTRAMUSCULAR; INTRAVENOUS at 07:22

## 2018-01-18 RX ADMIN — ONDANSETRON 4 MG: 2 INJECTION INTRAMUSCULAR; INTRAVENOUS at 07:48

## 2018-01-18 RX ADMIN — FENTANYL CITRATE 50 MCG: 50 INJECTION, SOLUTION INTRAMUSCULAR; INTRAVENOUS at 07:33

## 2018-01-18 RX ADMIN — HEPARIN SODIUM 5000 UNITS: 5000 INJECTION, SOLUTION INTRAVENOUS; SUBCUTANEOUS at 21:21

## 2018-01-18 RX ADMIN — SODIUM CHLORIDE, SODIUM LACTATE, POTASSIUM CHLORIDE, AND CALCIUM CHLORIDE 25 ML/HR: 600; 310; 30; 20 INJECTION, SOLUTION INTRAVENOUS at 07:15

## 2018-01-18 RX ADMIN — FENTANYL CITRATE 100 MCG: 50 INJECTION, SOLUTION INTRAMUSCULAR; INTRAVENOUS at 08:09

## 2018-01-18 RX ADMIN — Medication 10 ML: at 02:23

## 2018-01-18 RX ADMIN — PROPOFOL 180 MG: 10 INJECTION, EMULSION INTRAVENOUS at 07:33

## 2018-01-18 RX ADMIN — HYDROMORPHONE HYDROCHLORIDE 0.4 MG: 2 INJECTION, SOLUTION INTRAMUSCULAR; INTRAVENOUS; SUBCUTANEOUS at 10:15

## 2018-01-18 NOTE — OP NOTES
[address] [facility] OP NOTE    Sharlette Meckel.  MR#: [id number]  : 1945  ACCOUNT #: [de-identified]   DATE OF SERVICE: 2018    PREOPERATIVE DIAGNOSIS:  Colon mass. POSTOPERATIVE DIAGNOSIS:  Colon mass. PROCEDURE PERFORMED:  Cystoscopy with insertion of left ureteral stent. SURGEON:  Nate Staples M.D.    ASSISTANT:      ANESTHESIA:  General.    ESTIMATED BLOOD LOSS:  None. COMPLICATIONS:  None. SPECIMENS:  None. FINDINGS:  Normal bladder. IMPLANTS:      FOR PROCEDURE:  The patient had been brought to the operating room by Dr. Rowan Habermann, prepped and draped in standard fashion. Timeout was performed, verifying patient, procedure, laterality placed a cystoscope into the urethra demonstrating a normal urethra, prostate with benign appearing enlargement and essentially the entire bladder appeared within normal limits and there were no mucosal lesions. There was some slight trabeculation. At this point, a whistle tip catheter was then placed, 5-Montenegrin into the left ureteral orifice and passed up to 20+ cm without difficulty. Secured in position. Rocha catheter placed. The patient was then turned over to Dr. Greg Yañez for the remainder of his procedure.       MD WIN Triplett / Esther Joshi  D: 2018 11:31     T: 2018 12:12  JOB #: [id number]

## 2018-01-18 NOTE — PERIOP NOTES
TRANSFER - IN REPORT:    Verbal report received from Wang Lew RN(name) on Venus Baldwin  being received from [facility] Room 2290(unit) for ordered procedure      Report consisted of patients Situation, Background, Assessment and   Recommendations(SBAR). Information from the following report(s) SBAR, Kardex, Intake/Output, MAR and Recent Results was reviewed with the receiving nurse. Opportunity for questions and clarification was provided. Assessment completed upon patients arrival to unit and care assumed.

## 2018-01-18 NOTE — PERIOP NOTES
Handoff Report from Operating Room to PACU    Report received from BERNARDA Rivera RN and Gabriela Loyola CRNA regarding Sammy Tran. Surgeon(s):  MD Taisha Zee MD  And Procedure(s) (LRB):  LAPAROSCOPIC HAND ASSISTED LEFT COLECTOMY, CYSTO WITH INSERTION LEFT URETERAL STENT, AND REMOVAL LEFT URETERAL STENT (Left)  CYSTOSCOPY INSERTION LEFT URETERAL STENT (Left)  confirmed   with allergies, drains and dressings discussed. Anesthesia type, drugs, patient history, complications, estimated blood loss, vital signs, intake and output, and last pain medication, lines, reversal medications and temperature were reviewed.

## 2018-01-18 NOTE — PROGRESS NOTES
Contact numbers for patient are : Mary Anne(wife) -[phone] and Matilde(daughter) -[phone].

## 2018-01-18 NOTE — ROUTINE PROCESS
Patient: Sammy Tran MRN: [id number]  SSN: [id number]   YOB: 1945  Age: 71 y.o. Sex: male     Patient is status post Procedure(s):  LAPAROSCOPIC HAND ASSISTED LEFT COLECTOMY WITH INTRAOPERATIVE COLONOSCOPY, CYSTO WITH INSERTION LEFT URETERAL STENT  CYSTOSCOPY INSERTION LEFT URETERAL STENT. Surgeon(s) and Role:  Panel 1:     * Swetha Ferrari MD - Primary    Panel 2:     * Taihsa Gale MD - Primary    Local/Dose/Irrigation:  30 ML LOCAL INJECTION TO ABDOMEN                  Peripheral IV 01/12/18 Right Antecubital (Active)   Site Assessment Clean, dry, & intact 1/18/2018  7:15 AM   Phlebitis Assessment 0 1/18/2018  7:15 AM   Infiltration Assessment 0 1/18/2018  7:15 AM   Dressing Status Clean, dry, & intact 1/18/2018  7:15 AM   Dressing Type Tape;Transparent 1/18/2018  7:15 AM   Hub Color/Line Status Pink;Flushed 1/18/2018  7:15 AM   Action Taken Blood drawn 1/12/2018 11:20 AM       Peripheral IV 01/12/18 Left Forearm (Active)   Site Assessment Clean, dry, & intact 1/18/2018  7:15 AM   Phlebitis Assessment 0 1/18/2018  7:15 AM   Infiltration Assessment 0 1/18/2018  7:15 AM   Dressing Status Clean, dry, & intact 1/18/2018  7:15 AM   Dressing Type Tape;Transparent 1/18/2018  7:15 AM   Hub Color/Line Status Pink; Infusing;Flushed 1/18/2018  7:15 AM            Airway - Endotracheal Tube Oral (Active)                   Dressing/Packing:  Wound Abdomen Mid;Left-DRESSING TYPE: Sutures; Topical skin adhesive/glue (01/18/18 1053)  Splint/Cast:  ]    Other:  ANDREWS CATH IN PLACE.  LEFT URETERAL STENT REMOVED

## 2018-01-18 NOTE — PROGRESS NOTES
Hospitalist Progress Note    NAME: Samantha Mcnally   :  1945   MRN:  [id number]       Assessment / Plan:  Atrial fib with RVR, HTN and CKD. Cardiology Inputs and recommendations appreciated. Hx of cardiomyopathy with EF 30% s/p BiV ICD, last EF normalized  Hx of PAF s/p cardioversion had been in sinus on xarelto and amiodarone  holding Lovenox for surgery .      Weight loss. Intermittent vomiting  Colon mass  GI/surgery  Inputs and recommendations appreciated. S/p Egd/colonoscopy, EGD with esophageal ulcers on peocid, colonoscopy with suspected colon cancer   Path reports reviewed. S/p Cystoscopy with insertion of left ureteral stent and laparoscopic lt colectomy  Path report is pending    CT chest. Needs OP follow up. Centrilobular emphysema, 4.6 cm fusiform ascending aortic aneurysm,  interstitial septal thickening which may reflect mild edema or chronic  interstitial disease.      Acutely elevated LFTs  Hx hepatic steatosis  No abdominal pain  Follow up LFT , slowly trending down. Unremarkable CT through the abdomen and pelvis on 2017. GI on board .     Aspiration with thin liquids on MBS  [place] thick liquids once resume diet  S/p  EGD ,Small proximal antral nodule--submucosal  Distal esophageal ulcers,Successful empiric dilation of the ge junction. Pepcid     Body mass index is 23.21 kg/(m^2). Body mass index is 22.7 kg/(m^2). Code status: Full  Prophylaxis: Lovenox  Recommended Disposition: Home w/Family     Subjective:     Chief Complaint / Reason for Physician Visit   no c/o, feeling fine  Discussed with RN events overnight.      Review of Systems:  Symptom Y/N Comments  Symptom Y/N Comments   Fever/Chills n   Chest Pain n    Poor Appetite n   Edema n    Cough n   Abdominal Pain n    Sputum n   Joint Pain n    SOB/GARCIA n   Pruritis/Rash n    Nausea/vomit n   Tolerating PT/OT     Diarrhea n   Tolerating Diet     Constipation n   Other       Could NOT obtain due to: Objective:     VITALS:   Last 24hrs VS reviewed since prior progress note. Most recent are:  Patient Vitals for the past 24 hrs:   Temp Pulse Resp BP SpO2   01/18/18 1237 97.7 °F (36.5 °C) (!) 104 18 121/89 99 %   01/18/18 1215 98.3 °F (36.8 °C) 95 19 130/81 97 %   01/18/18 1200 - 98 18 131/82 97 %   01/18/18 1145 - 99 19 130/78 97 %   01/18/18 1130 - (!) 105 19 123/77 97 %   01/18/18 1125 - 99 18 118/70 97 %   01/18/18 1120 - (!) 105 19 112/75 96 %   01/18/18 1115 - (!) 105 20 116/74 97 %   01/18/18 1112 98.5 °F (36.9 °C) (!) 111 20 119/80 97 %   01/18/18 0655 97.8 °F (36.6 °C) 81 20 143/88 96 %   01/18/18 0311 97.4 °F (36.3 °C) (!) 102 20 123/76 99 %   01/17/18 2304 97.9 °F (36.6 °C) 82 18 133/68 100 %   01/17/18 1928 97.6 °F (36.4 °C) 83 18 115/66 98 %   01/17/18 1538 98.1 °F (36.7 °C) 77 18 109/63 97 %       Intake/Output Summary (Last 24 hours) at 01/18/18 1456  Last data filed at 01/18/18 1058   Gross per 24 hour   Intake             3200 ml   Output              150 ml   Net             3050 ml        PHYSICAL EXAM:  General: WD, WN. Alert, cooperative, no acute distress    EENT:  EOMI. Anicteric sclerae. MMM  Resp:  CTA bilaterally, no wheezing or rales. No accessory muscle use  CV:  Regular  rhythm,  No edema  GI:  Soft, Non distended, Non tender.  +Bowel sounds lap incisions noted  Neurologic:  Alert and oriented X 3, normal speech, grossly intact  Psych:   Good insight. Not anxious nor agitated  Skin:  No rashes.   No jaundice    Reviewed most current lab test results and cultures  YES  Reviewed most current radiology test results   YES  Review and summation of old records today    NO  Reviewed patient's current orders and MAR    YES  PMH/ reviewed - no change compared to H&P  ________________________________________________________________________  Care Plan discussed with:    Comments   Patient y    Family  y    RN y    Care Manager y    Consultant                        Multidiciplinary team rounds were held today with , nursing, pharmacist and clinical coordinator. Patient's plan of care was discussed; medications were reviewed and discharge planning was addressed. ________________________________________________________________________  Total NON critical care TIME:  30   Minutes    Total CRITICAL CARE TIME Spent:   Minutes non procedure based      Comments   >50% of visit spent in counseling and coordination of care     ________________________________________________________________________  Lynne Weiner MD     Procedures: see electronic medical records for all procedures/Xrays and details which were not copied into this note but were reviewed prior to creation of Plan. LABS:  I reviewed today's most current labs and imaging studies.   Pertinent labs include:  Recent Labs      01/18/18   0326  01/17/18   0306   WBC  5.8  7.1   HGB  12.4  12.9   HCT  37.9  39.6   PLT  218  221     Recent Labs      01/18/18   0326  01/17/18   0306   NA  141  140   K  3.9  4.1   CL  110*  109*   CO2  25  24   GLU  95  89   BUN  16  23*   CREA  0.80  0.93   CA  8.5  8.7   MG  1.8  1.9   PHOS  2.9  4.0   ALB   --   2.3*   TBILI   --   1.1*   SGOT   --   151*   ALT   --   176*       Signed: Lynne Weiner MD

## 2018-01-18 NOTE — BRIEF OP NOTE
BRIEF OPERATIVE NOTE    Date of Procedure: 1/18/2018   Preoperative Diagnosis: COLON MASS   Postoperative Diagnosis: COLON MASS    Procedure(s):  LAPAROSCOPIC HAND ASSISTED LEFT COLECTOMY WITH INTRAOPERATIVE COLONOSCOPY, CYSTO WITH INSERTION LEFT URETERAL STENT  CYSTOSCOPY INSERTION LEFT URETERAL STENT  Surgeon(s) and Role:  Panel 1:     * Nikolai Mejia MD - Primary    Panel 2:     * Carisa El MD - Primary         Assistant Staff:       Surgical Staff:  Circ-1: Etelvina Fleming RN  Circ-Relief: Madison Lewis  Scrub Tech-1: Nemiah Haste  Surg Asst-1: Amada Ports McPhipps  Event Time In   Incision Start 0809   Incision Close 1055     Anesthesia: General   Estimated Blood Loss: 100ml  Specimens:   ID Type Source Tests Collected by Time Destination   1 : LEFT COLON Preservative Colon, Left  Nikolai Mejia MD 1/18/2018 1042 Pathology      Complications: None  Implants: * No implants in log *

## 2018-01-18 NOTE — ANESTHESIA PREPROCEDURE EVALUATION
Anesthetic History   No history of anesthetic complications            Review of Systems / Medical History  Patient summary reviewed, nursing notes reviewed and pertinent labs reviewed    Pulmonary          Smoker (30 pk yrs)         Neuro/Psych   Within defined limits           Cardiovascular    Hypertension        Dysrhythmias : atrial fibrillation  Pacemaker    Exercise tolerance: >4 METS  Comments: Echo in 2015 showed EF 55-65% with mod MR           GI/Hepatic/Renal  Within defined limits              Endo/Other        Arthritis     Other Findings   Comments: Back surgery         Physical Exam    Airway  Mallampati: II  TM Distance: 4 - 6 cm  Neck ROM: normal range of motion   Mouth opening: Normal     Cardiovascular    Rhythm: irregular  Rate: normal         Dental  No notable dental hx    Comments: Numerous mission teeth , the remaining teeth in poor condition.    Pulmonary  Breath sounds clear to auscultation               Abdominal  GI exam deferred       Other Findings            Anesthetic Plan    ASA: 3  Anesthesia type: general            Anesthetic plan and risks discussed with: Patient      Pt states he did take BB this am.

## 2018-01-18 NOTE — PROGRESS NOTES
CM completed chart review and was notified by discharge board that Pt is anticipated discharge for today. CM does not anticipate any discharge needs. CM did ask CM Specialist to start working on PCP appointment on AVS.     CM will continue to monitor discharge plan.      Onel Mendes

## 2018-01-18 NOTE — PROGRESS NOTES
1410 Pt post op and received full liquid diet tray. Family expressed concern. Called Dr. Alexa Garcia office to see if should only be clear liquid. Nurse Gaurang will call me back to confirm. 1600 Spoke to Dr. Kiki Miles and he meant to order full liquid diet. Pt has been keeping down ice chips and sips of water with no complaints. Pt refusing SCD's. Pt educated on DVT risks, especially after surgery. Bedside shift change report given to Boris Aguilera (oncoming nurse) by Letitia Arreola RN (offgoing nurse). Report included the following information SBAR. SHIFT SUMMARY:            [facility] NURSING NOTE   Admission Date 1/12/2018   Admission Diagnosis Atrial fibrillation with RVR (HCC)  dysphagia, wt loss  COLON MASS    Consults IP CONSULT TO GASTROENTEROLOGY  IP CONSULT TO COLORECTAL SURGERY      Cardiac Monitoring [x] Yes [] No      Purposeful Hourly Rounding [x] Yes    Darius Score Total Score: 1   Darius score 3 or > [x] Bed Alarm [] Avasys [] 1:1 sitter [] Patient refused (Place signed refusal form in chart)   Tr Score Tr Score: 20   Tr score 14 or < [] PMT consult [] Wound Care consult    []  Specialty bed  [] Nutrition consult      Influenza Vaccine Received Flu Vaccine for Current Season (usually Sept-March): Yes    Patient/Guardian Refused (Notify MD): No      Oxygen needs? [x] Room air Oxygen @  []1L    []2L    []3L   []4L    []5L   []6L     Use home O2? [] Yes [x] No  Perform O2 challenge test using  smartphrase (.Homeoxygen)      Last bowel movement Last Bowel Movement Date: 01/18/18      Urinary Catheter Urinary Catheter 01/18/18 2- way; Rocha-Criteria for Appropriate Use: Surgical procedure     Urinary Catheter 01/18/18 2- way; Rocha-Urine Output (mL): 200 ml     LDAs               Peripheral IV 01/12/18 Right Antecubital (Active)   Site Assessment Clean, dry, & intact 1/18/2018 12:43 PM   Phlebitis Assessment 0 1/18/2018 12:43 PM   Infiltration Assessment 0 1/18/2018 12:43 PM   Dressing Status Clean, dry, & intact 1/18/2018 12:43 PM   Dressing Type Tape;Transparent 1/18/2018 12:43 PM   Hub Color/Line Status Pink; Infusing 1/18/2018 12:43 PM   Action Taken Blood drawn 1/12/2018 11:20 AM       Peripheral IV 01/12/18 Left Forearm (Active)   Site Assessment Clean, dry, & intact 1/18/2018 12:43 PM   Phlebitis Assessment 0 1/18/2018 12:43 PM   Infiltration Assessment 0 1/18/2018 12:43 PM   Dressing Status Clean, dry, & intact 1/18/2018 12:43 PM   Dressing Type Tape;Transparent 1/18/2018 12:43 PM   Hub Color/Line Status Pink 1/18/2018 12:43 PM                         Readmission Risk Assessment Tool Score Low Risk            11       Total Score        3 Has Seen PCP in Last 6 Months (Yes=3, No=0)    3 Patient Length of Stay (>5 days = 3)    5 Pt. Coverage (Medicare=5 , Medicaid, or Self-Pay=4)        Criteria that do not apply:    . Living with Significant Other. Assisted Living. LTAC. SNF.  or   Rehab    IP Visits Last 12 Months (1-3=4, 4=9, >4=11)    Charlson Comorbidity Score (Age + Comorbid Conditions)       Expected Length of Stay 6d 7h   Actual Length of Stay 6

## 2018-01-18 NOTE — OP NOTES
[address] [facility] OP NOTE    Mendel Land.  MR#: [id number]  : 1945  ACCOUNT #: [de-identified]   DATE OF SERVICE: 2018    PREOPERATIVE DIAGNOSIS:  Left colon mass. POSTOPERATIVE DIAGNOSIS:  Left colon mass. PROCEDURES PERFORMED:  Laparoscopic hand-assisted left colectomy. COMPLICATIONS:  None. SURGEON:  Darvin Hdz MD    ANESTHESIA:  General.    ESTIMATED BLOOD LOSS:  100 mL. SPECIMEN:  Left colon. DESCRIPTION OF PROCEDURE:  The patient was brought to the operating suite. He was placed in supine position. General endotracheal anesthesia was induced. Stockings were placed as well as Rocha catheter. Dr. Luiz Pang placed a left ureteral system. For more details of this procedure, please refer to his operative note. Patient was then placed in the lithotomy position. His abdomen was prepped and draped in usual sterile fashion. A timeout was performed indicating the correct patient and procedure being performed as per hospital protocol. A 12 mm mid vertical midline incision was made above the umbilicus. The incision was taken down to the level of the fascia. The fascia was elevated using a 0 Vicryl suture. The fascia was incised and the abdomen was entered under direct visualization. A 12 mm Jazmyne trocar was placed in the abdomen and the abdomen was insufflated. Upon inspection of the abdomen, he was noted to have tattoo marks along the splenic flexure and proximal descending colon. At this time, the 5 mm trocar was placed in the left lower quadrant and a vertical midline incision from the hand port was placed in the left of the abdomen. The incision was taken down to the level of the fascia. The fascia was incised vertically. The muscle was spread and the posterior rectus sheath was incised gaining access into the abdomen. The medium size Ander wound protector was placed into the incision followed by [employer].   Patient was then tilted to the right and placed in Trendelenburg position. We began by dissection along the white line of Toldt and the lateral peritoneal reflection of the ascending colon. This was incised allowing the colon to be reflected medially. Once this was adequately mobilized, I continued my dissection proximally toward the splenic flexure. The splenic flexure was taken down along its lateral attachments and then the greater omentum was divided off the transverse colon, gaining access into the lesser sac. Later, the omentum was then divided completely off of the transverse colon from the hepatic flexure to the splenic flexure to allow for excellent mobilization. The posterior attachments were also divided to allow for complete mobilization of the transverse and descending colon as well as the sigmoid colon. Once this was completely mobilized, I identified proximal and distal resection margins. The left colic artery and vein were taken just distal to its takeoff from the JOSSE. The JOSSE was preserved to allow excellent blood supply to the sigmoid colon. Once this was divided, the mesenteric windows were created proximally and distally allowing greater than 5 cm margins from the tumor itself. The mesentery was taken down using LigaSure device. It was at this time that we were ready to create the anastomosis. The bowel was exteriorized through the hand port and the bowel was inspected and delineations were noted in areas of devascularization. This was confirmed using fluorescein dye and a Wood's lamp. Antimesenteric portion of the bowel was resected and a colotomy was made proximally and distally along the transverse colon and distal descending colon. A 75 mm ADENIKE stapler was placed in the colotomy and a side-to-side functional end-to-end anastomosis was created. The common enterotomy was closed using a TL90 stapler. The specimen was removed and sent to Pathology for further examination.   The staple line was imbricated using 3-0 Vicryl sutures in a Lembert fashion. The tip of the staple line was also approximated, reducing tension, using a 3-0 Vicryl suture. Of note, there was excellent bleeding proximally and distally and excellent blood supply was noted. The  anastomosis was brought back into the abdomen. The abdomen was reinspected to assure for hemostasis. Once hemostasis was assured, we were ready to close the abdomen. The paramedian incision was closed using a 3-0 Vicryl suture for the peritoneal layer. The fascia was closed with a running 0 looped PDS suture. The   Jazmyne trocar site was closed with 0 Vicryl suture as well. All skin incisions were closed with 4-0 Monocryl followed by Exofin. The patient was awakened, extubated, and taken to recovery room in stable condition.       MANUEL Charlton / [place]  D: 01/18/2018 11:05     T: 01/18/2018 11:46  JOB #: [id number]

## 2018-01-18 NOTE — PROGRESS NOTES
Bedside shift change report given to Brigitte Webber (oncoming nurse) by Hilary Olguin (offgoing nurse). Report included the following information SBAR, Kardex, Intake/Output, MAR and Recent Results. SHIFT SUMMARY:            [address] NURSING NOTE   Admission Date 1/12/2018   Admission Diagnosis Atrial fibrillation with RVR (HCC)  dysphagia, wt loss  COLON MASS    Consults IP CONSULT TO GASTROENTEROLOGY  IP CONSULT TO COLORECTAL SURGERY      Cardiac Monitoring [x] Yes [] No      Purposeful Hourly Rounding [x] Yes    Darius Score Total Score: 2   Darius score 3 or > [] Bed Alarm [] Avasys [] 1:1 sitter [] Patient refused (Place signed refusal form in chart)   Tr Score Tr Score: 21   Tr score 14 or < [] PMT consult [] Wound Care consult    []  Specialty bed  [] Nutrition consult      Influenza Vaccine Received Flu Vaccine for Current Season (usually Sept-March): Yes    Patient/Guardian Refused (Notify MD): No      Oxygen needs?  [x] Room air Oxygen @  []1L    []2L    []3L   []4L    []5L   []6L     Use home O2? [] Yes [x] No  Perform O2 challenge test using  smartphrase (.Homeoxygen)      Last bowel movement Last Bowel Movement Date: 01/17/18      Urinary Catheter             LDAs               Peripheral IV 01/12/18 Right Antecubital (Active)   Site Assessment Clean, dry, & intact 1/17/2018  7:56 PM   Phlebitis Assessment 0 1/17/2018  7:56 PM   Infiltration Assessment 0 1/17/2018  7:56 PM   Dressing Status Clean, dry, & intact 1/17/2018  7:56 PM   Dressing Type Transparent 1/17/2018  7:56 PM   Hub Color/Line Status Pink;Capped 1/17/2018  7:56 PM   Action Taken Blood drawn 1/12/2018 11:20 AM       Peripheral IV 01/12/18 Left Forearm (Active)   Site Assessment Clean, dry, & intact 1/17/2018  7:56 PM   Phlebitis Assessment 0 1/17/2018  7:56 PM   Infiltration Assessment 0 1/17/2018  7:56 PM   Dressing Status Clean, dry, & intact 1/17/2018  7:56 PM   Dressing Type Transparent 1/17/2018  7:56 PM   Hub Color/Line Status Pink;Capped 1/17/2018  7:56 PM                         Readmission Risk Assessment Tool Score Low Risk            11       Total Score        3 Has Seen PCP in Last 6 Months (Yes=3, No=0)    3 Patient Length of Stay (>5 days = 3)    5 Pt. Coverage (Medicare=5 , Medicaid, or Self-Pay=4)        Criteria that do not apply:    . Living with Significant Other. Assisted Living. LTAC. SNF.  or   Rehab    IP Visits Last 12 Months (1-3=4, 4=9, >4=11)    Charlson Comorbidity Score (Age + Comorbid Conditions)       Expected Length of Stay 2d 12h   Actual Length of Stay 5

## 2018-01-18 NOTE — CONSULTS
Asked to place ureteral stents by Dr. Greg Yañez. Patient is aware of indications. Verbalizes agreement with temporary stent placement. Risk vs benefits outlined. He agrees to proceed.

## 2018-01-18 NOTE — ANESTHESIA POSTPROCEDURE EVALUATION
Post-Anesthesia Evaluation and Assessment    Patient: Yudith Small MRN: [id number]  SSN: [id number]    YOB: 1945  Age: 71 y.o. Sex: male       Cardiovascular Function/Vital Signs  Visit Vitals    /82    Pulse 98    Temp 36.9 °C (98.5 °F)    Resp 18    Ht 6' 4\" (1.93 m)    Wt 84.6 kg (186 lb 8.2 oz)    SpO2 97%    BMI 22.7 kg/m2       Patient is status post general anesthesia for Procedure(s):  LAPAROSCOPIC HAND ASSISTED LEFT COLECTOMY, CYSTO WITH INSERTION LEFT URETERAL STENT, AND REMOVAL LEFT URETERAL STENT  CYSTOSCOPY INSERTION LEFT URETERAL STENT. Nausea/Vomiting: None    Postoperative hydration reviewed and adequate. Pain:  Pain Scale 1: Visual (01/18/18 1111)  Pain Intensity 1: 0 (01/18/18 1111)   Managed    Neurological Status:   Neuro (WDL): Exceptions to WDL (01/18/18 1203)  Neuro  Neurologic State: Drowsy; Eyes open to voice (01/18/18 1203)  Orientation Level: Appropriate for age;Oriented to person;Oriented to place;Oriented to situation (01/18/18 1203)  Cognition: Appropriate decision making; Appropriate for age attention/concentration; Appropriate safety awareness; Follows commands (01/18/18 1203)  Speech: Clear (01/18/18 1203)  LUE Motor Response: Purposeful (01/18/18 1203)  LLE Motor Response: Purposeful (01/18/18 1203)  RUE Motor Response: Purposeful (01/18/18 1203)  RLE Motor Response: Purposeful (01/18/18 1203)   At baseline    Mental Status and Level of Consciousness: Arousable    Pulmonary Status:   O2 Device: Nasal cannula (01/18/18 1112)   Adequate oxygenation and airway patent    Complications related to anesthesia: None    Post-anesthesia assessment completed.  No concerns    Signed By: Antelmo Dasilva MD     January 18, 2018

## 2018-01-18 NOTE — PERIOP NOTES
Wound Protector Used:  YES Yes or No or N/A       Closing Tray Isolated and used for closing fascia and skin:  YESYes or No or N/A   Sterile glove change by all team members prior to close:  YSYes or No or N/A  Gown changed by all team members prior to close: YESYes or No or N/A

## 2018-01-18 NOTE — PERIOP NOTES
TRANSFER - OUT REPORT:    Verbal report given to Colin Mcgee RN(name) on Yudith Small  being transferred to 2290(unit) for routine progression of care       Report consisted of patients Situation, Background, Assessment and   Recommendations(SBAR). Information from the following report(s) SBAR, OR Summary, Procedure Summary, Intake/Output and MAR was reviewed with the receiving nurse. Opportunity for questions and clarification was provided.       Patient transported with:   Monitor  O2 @ 2 liters  Registered Nurse  [facility]

## 2018-01-18 NOTE — PROGRESS NOTES
Problem: Falls - Risk of  Goal: *Absence of Falls  Document Darius Fall Risk and appropriate interventions in the flowsheet. Outcome: Progressing Towards Goal  Fall Risk Interventions:  Mobility Interventions: Bed/chair exit alarm, Communicate number of staff needed for ambulation/transfer, Mechanical lift, OT consult for ADLs, Patient to call before getting OOB, PT Consult for assist device competence         Medication Interventions: Teach patient to arise slowly    Elimination Interventions: Call light in reach, Elevated toilet seat, Patient to call for help with toileting needs, Toilet paper/wipes in reach    History of Falls Interventions:  Investigate reason for fall

## 2018-01-19 LAB
ANION GAP SERPL CALC-SCNC: 8 MMOL/L (ref 5–15)
BASOPHILS # BLD: 0 K/UL (ref 0–0.1)
BASOPHILS NFR BLD: 0 % (ref 0–1)
BUN SERPL-MCNC: 25 MG/DL (ref 6–20)
BUN/CREAT SERPL: 26 (ref 12–20)
CALCIUM SERPL-MCNC: 8.5 MG/DL (ref 8.5–10.1)
CHLORIDE SERPL-SCNC: 109 MMOL/L (ref 97–108)
CO2 SERPL-SCNC: 22 MMOL/L (ref 21–32)
CREAT SERPL-MCNC: 0.95 MG/DL (ref 0.7–1.3)
EOSINOPHIL # BLD: 0 K/UL (ref 0–0.4)
EOSINOPHIL NFR BLD: 0 % (ref 0–7)
ERYTHROCYTE [DISTWIDTH] IN BLOOD BY AUTOMATED COUNT: 15.9 % (ref 11.5–14.5)
GLUCOSE SERPL-MCNC: 164 MG/DL (ref 65–100)
HCT VFR BLD AUTO: 39.2 % (ref 36.6–50.3)
HGB BLD-MCNC: 12.7 G/DL (ref 12.1–17)
LYMPHOCYTES # BLD: 0.4 K/UL (ref 0.8–3.5)
LYMPHOCYTES NFR BLD: 3 % (ref 12–49)
MAGNESIUM SERPL-MCNC: 1.8 MG/DL (ref 1.6–2.4)
MCH RBC QN AUTO: 27.5 PG (ref 26–34)
MCHC RBC AUTO-ENTMCNC: 32.4 G/DL (ref 30–36.5)
MCV RBC AUTO: 84.8 FL (ref 80–99)
MONOCYTES # BLD: 1.1 K/UL (ref 0–1)
MONOCYTES NFR BLD: 8 % (ref 5–13)
NEUTS SEG # BLD: 11.5 K/UL (ref 1.8–8)
NEUTS SEG NFR BLD: 89 % (ref 32–75)
PLATELET # BLD AUTO: 238 K/UL (ref 150–400)
POTASSIUM SERPL-SCNC: 4.4 MMOL/L (ref 3.5–5.1)
RBC # BLD AUTO: 4.62 M/UL (ref 4.1–5.7)
SODIUM SERPL-SCNC: 139 MMOL/L (ref 136–145)
WBC # BLD AUTO: 13 K/UL (ref 4.1–11.1)

## 2018-01-19 PROCEDURE — 80048 BASIC METABOLIC PNL TOTAL CA: CPT | Performed by: SURGERY

## 2018-01-19 PROCEDURE — 74011250636 HC RX REV CODE- 250/636: Performed by: SURGERY

## 2018-01-19 PROCEDURE — 85025 COMPLETE CBC W/AUTO DIFF WBC: CPT | Performed by: SURGERY

## 2018-01-19 PROCEDURE — 97161 PT EVAL LOW COMPLEX 20 MIN: CPT

## 2018-01-19 PROCEDURE — 74011250637 HC RX REV CODE- 250/637: Performed by: HOSPITALIST

## 2018-01-19 PROCEDURE — 97116 GAIT TRAINING THERAPY: CPT

## 2018-01-19 PROCEDURE — 36415 COLL VENOUS BLD VENIPUNCTURE: CPT | Performed by: SURGERY

## 2018-01-19 PROCEDURE — 65660000000 HC RM CCU STEPDOWN

## 2018-01-19 PROCEDURE — 74011250637 HC RX REV CODE- 250/637: Performed by: SURGERY

## 2018-01-19 PROCEDURE — 74011250637 HC RX REV CODE- 250/637: Performed by: INTERNAL MEDICINE

## 2018-01-19 PROCEDURE — 83735 ASSAY OF MAGNESIUM: CPT | Performed by: SURGERY

## 2018-01-19 RX ADMIN — DILTIAZEM HYDROCHLORIDE 180 MG: 180 CAPSULE, COATED, EXTENDED RELEASE ORAL at 09:28

## 2018-01-19 RX ADMIN — HEPARIN SODIUM 5000 UNITS: 5000 INJECTION, SOLUTION INTRAVENOUS; SUBCUTANEOUS at 21:20

## 2018-01-19 RX ADMIN — AMIODARONE HYDROCHLORIDE 400 MG: 200 TABLET ORAL at 09:27

## 2018-01-19 RX ADMIN — HYDROMORPHONE HYDROCHLORIDE 0.5 MG: 2 INJECTION INTRAMUSCULAR; INTRAVENOUS; SUBCUTANEOUS at 23:28

## 2018-01-19 RX ADMIN — Medication 10 ML: at 13:36

## 2018-01-19 RX ADMIN — ALVIMOPAN 12 MG: 12 CAPSULE ORAL at 09:27

## 2018-01-19 RX ADMIN — VALSARTAN 80 MG: 40 TABLET, FILM COATED ORAL at 09:27

## 2018-01-19 RX ADMIN — HYDROMORPHONE HYDROCHLORIDE 0.5 MG: 2 INJECTION INTRAMUSCULAR; INTRAVENOUS; SUBCUTANEOUS at 04:22

## 2018-01-19 RX ADMIN — OXYCODONE HYDROCHLORIDE 10 MG: 5 TABLET ORAL at 02:41

## 2018-01-19 RX ADMIN — ALVIMOPAN 12 MG: 12 CAPSULE ORAL at 18:21

## 2018-01-19 RX ADMIN — Medication 10 ML: at 21:22

## 2018-01-19 RX ADMIN — HYDROMORPHONE HYDROCHLORIDE 0.5 MG: 2 INJECTION INTRAMUSCULAR; INTRAVENOUS; SUBCUTANEOUS at 07:45

## 2018-01-19 RX ADMIN — FAMOTIDINE 20 MG: 20 TABLET, FILM COATED ORAL at 09:27

## 2018-01-19 RX ADMIN — OXYCODONE HYDROCHLORIDE 10 MG: 5 TABLET ORAL at 21:21

## 2018-01-19 RX ADMIN — HEPARIN SODIUM 5000 UNITS: 5000 INJECTION, SOLUTION INTRAVENOUS; SUBCUTANEOUS at 09:25

## 2018-01-19 RX ADMIN — METOPROLOL SUCCINATE 50 MG: 50 TABLET, EXTENDED RELEASE ORAL at 09:27

## 2018-01-19 RX ADMIN — Medication 10 ML: at 02:33

## 2018-01-19 RX ADMIN — OXYCODONE HYDROCHLORIDE 10 MG: 5 TABLET ORAL at 12:52

## 2018-01-19 RX ADMIN — HYDROMORPHONE HYDROCHLORIDE 0.5 MG: 2 INJECTION INTRAMUSCULAR; INTRAVENOUS; SUBCUTANEOUS at 17:23

## 2018-01-19 RX ADMIN — FAMOTIDINE 20 MG: 20 TABLET, FILM COATED ORAL at 17:23

## 2018-01-19 NOTE — PROGRESS NOTES
Cardiopulmonary Care Interdisciplinary rounds were held today to discuss patient plan of care and outcomes. The following members were present: NP/Physician, PT, Pharmacy, Nursing, Nutritionist and Case Management.     Expected Length of Stay:  6d 7h  Geometric Length of Stay: DRG GLOS: 6.3    Plan of Care: Continue current treatment plan

## 2018-01-19 NOTE — PROGRESS NOTES
F/U esophgeal ulcer  Anemia  Weight loss  Colon mass s/p left colectomy yesterday    Yonatan Winchester PA-C for Dr. Lawrence Wilcox    S: Mr. Tj Gil was seen by me today during rounds. At this time, he is resting comfortably. The patient has no new complaints today. Please see admission consult for details of ROS; there are + changes today. Urinary catheter was removed and he hopes to get up and walk around today. He had a little bit of abd soreness last night that started once the pain medications wore off. Pain got up to 7/10 but was relieved with PRN oxycodone. No flatus or BM's yet. Tolerating a full liquid diet. O: Blood pressure (!) 129/94, pulse 97, temperature 97.9 °F (36.6 °C), resp. rate 20, height 6' 4\" (1.93 m), weight 84.6 kg (186 lb 8.2 oz), SpO2 96 %. Gen: Patient is in no acute distress. There is no  jaundice. Lungs: Clear to auscultation bilaterally . Heart : RRR. Abd: Soft, mildly tender, non-distended, bowel sounds present. Extremities: Warm. Final path from colectomy pending. Bx:     FINAL PATHOLOGIC DIAGNOSIS   1. Duodenum, biopsy:   Mild chronic duodenitis with mild blunting of villi or increased intraepithelial lymphocytes. 2. Gastric, biopsy:   Mild chronic superficial gastritis. Immunohistochemical stain for H. pylori is negative. 3. Distal esophagus, biopsy:   Mild chronic esophagitis with features suggestive of reflux. Gastric junctional mucosa; negative for specialized metaplastic epithelium and dysplasia. 4. Proximal gastric antral nodule, biopsy:   Mild chronic superficial gastritis and foveolar hyperplasia. Immunohistochemical stain for H. pylori is negative. 5. Cecal colon polyp, biopsy:   Tubular adenoma (multiple fragments). 6. Proximal transverse colon polyp, biopsy:   Tubular adenoma (multiple fragments). 7. 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  Electronically Signed Out By Debra Santos M.D. A: Principal Problem:    Atrial fibrillation with RVR ([address] (1/12/2018)    Active Problems:    Weight loss (1/12/2018)          Comment:   Continue current treatment    P. Follow final path.      JANET Vazquez  01/19/18  10:13 AM

## 2018-01-19 NOTE — PROGRESS NOTES
Problem: Falls - Risk of  Goal: *Absence of Falls  Document Darius Fall Risk and appropriate interventions in the flowsheet. Outcome: Progressing Towards Goal  Fall Risk Interventions:  Mobility Interventions: Bed/chair exit alarm, Communicate number of staff needed for ambulation/transfer, Mechanical lift, OT consult for ADLs, Patient to call before getting OOB, PT Consult for mobility concerns         Medication Interventions: Bed/chair exit alarm, Evaluate medications/consider consulting pharmacy, Patient to call before getting OOB, Teach patient to arise slowly    Elimination Interventions: Call light in reach, Elevated toilet seat, Patient to call for help with toileting needs, Toilet paper/wipes in reach, Toileting schedule/hourly rounds    History of Falls Interventions:  Investigate reason for fall

## 2018-01-19 NOTE — PROGRESS NOTES
Bedside shift change report given to Lauren Barragan (oncoming nurse) by [address] [place] [address] (offgoing nurse). Report included the following information SBAR, Kardex, Intake/Output, MAR and Recent Results. SHIFT SUMMARY:            [address] NURSING NOTE   Admission Date 1/12/2018   Admission Diagnosis Atrial fibrillation with RVR (HCC)  dysphagia, wt loss  COLON MASS    Consults IP CONSULT TO GASTROENTEROLOGY  IP CONSULT TO COLORECTAL SURGERY      Cardiac Monitoring [x] Yes [] No      Purposeful Hourly Rounding [x] Yes    Darius Score Total Score: 1   Darius score 3 or > [] Bed Alarm [] Avasys [] 1:1 sitter [] Patient refused (Place signed refusal form in chart)   Tr Score Tr Score: 20   Tr score 14 or < [] PMT consult [] Wound Care consult    []  Specialty bed  [] Nutrition consult      Influenza Vaccine Received Flu Vaccine for Current Season (usually Sept-March): Yes    Patient/Guardian Refused (Notify MD): No      Oxygen needs? [x] Room air Oxygen @  []1L    []2L    []3L   []4L    []5L   []6L     Use home O2? [] Yes [x] No  Perform O2 challenge test using  smartphrase (.Homeoxygen)      Last bowel movement Last Bowel Movement Date: 01/18/18      Urinary Catheter Urinary Catheter 01/18/18 2- way; Rocha-Criteria for Appropriate Use: Surgical procedure     Urinary Catheter 01/18/18 2- way; Rocha-Urine Output (mL): 150 ml     LDAs               Peripheral IV 01/12/18 Right Antecubital (Active)   Site Assessment Clean, dry, & intact 1/18/2018  7:54 PM   Phlebitis Assessment 0 1/18/2018  7:54 PM   Infiltration Assessment 0 1/18/2018  7:54 PM   Dressing Status Clean, dry, & intact 1/18/2018  7:54 PM   Dressing Type Transparent 1/18/2018  7:54 PM   Hub Color/Line Status Pink;Flushed 1/18/2018  7:54 PM   Action Taken Blood drawn 1/12/2018 11:20 AM       Peripheral IV 01/12/18 Left Forearm (Active)   Site Assessment Clean, dry, & intact 1/18/2018  7:54 PM   Phlebitis Assessment 0 1/18/2018  7:54 PM   Infiltration Assessment 0 1/18/2018  7:54 PM   Dressing Status Clean, dry, & intact 1/18/2018  7:54 PM   Dressing Type Transparent 1/18/2018  7:54 PM   Hub Color/Line Status Pink;Flushed 1/18/2018  7:54 PM                         Readmission Risk Assessment Tool Score Low Risk            11       Total Score        3 Has Seen PCP in Last 6 Months (Yes=3, No=0)    3 Patient Length of Stay (>5 days = 3)    5 Pt. Coverage (Medicare=5 , Medicaid, or Self-Pay=4)        Criteria that do not apply:    . Living with Significant Other. Assisted Living. LTAC. SNF.  or   Rehab    IP Visits Last 12 Months (1-3=4, 4=9, >4=11)    Charlson Comorbidity Score (Age + Comorbid Conditions)       Expected Length of Stay 6d 7h   Actual Length of Stay 6

## 2018-01-19 NOTE — PROGRESS NOTES
Hospitalist Progress Note    NAME: Noemy Mondragon   :  1945   MRN:  [id number]       Assessment / Plan:  Atrial fib with RVR, HTN and CKD. Cardiology Inputs and recommendations appreciated. Hx of cardiomyopathy with EF 30% s/p BiV ICD, last EF normalized  Hx of PAF s/p cardioversion had been in sinus on xarelto and amiodarone  Holding xarelto until discharge per surgery  start heparin subq      Weight loss. Intermittent vomiting  Colon mass, gastritis, duodenitis  GI/surgery  Inputs and recommendations appreciated. S/p Egd/colonoscopy, EGD with esophageal ulcers on peocid, colonoscopy with suspected colon cancer, tubular and villous adnoma  Path reports reviewed. S/p Cystoscopy with insertion of left ureteral stent and laparoscopic lt colectomy  Path report is pending    CT chest. Needs OP follow up. Centrilobular emphysema, 4.6 cm fusiform ascending aortic aneurysm,  interstitial septal thickening which may reflect mild edema or chronic  interstitial disease.      Acutely elevated LFTs  Hx hepatic steatosis  No abdominal pain  Follow up LFT , slowly trending down. Unremarkable CT through the abdomen and pelvis on 2017. GI on board .     Aspiration with thin liquids on MBS  [place] thick liquids once resume diet  S/p  EGD ,Small proximal antral nodule--submucosal  Distal esophageal ulcers,Successful empiric dilation of the ge junction. Pepcid     Body mass index is 23.21 kg/(m^2). Body mass index is 22.7 kg/(m^2). Code status: Full  Prophylaxis: Lovenox  Recommended Disposition: Home w/Family     Subjective:     Chief Complaint / Reason for Physician Visit   no c/o, feeling better Discussed with RN events overnight.      Review of Systems:  Symptom Y/N Comments  Symptom Y/N Comments   Fever/Chills n   Chest Pain n    Poor Appetite n   Edema n    Cough n   Abdominal Pain n    Sputum n   Joint Pain n    SOB/GARCIA n   Pruritis/Rash n    Nausea/vomit n   Tolerating PT/OT     Diarrhea n Tolerating Diet     Constipation n   Other       Could NOT obtain due to:      Objective:     VITALS:   Last 24hrs VS reviewed since prior progress note. Most recent are:  Patient Vitals for the past 24 hrs:   Temp Pulse Resp BP SpO2   01/19/18 1103 98 °F (36.7 °C) 98 20 128/82 95 %   01/19/18 0728 97.9 °F (36.6 °C) 97 20 (!) 129/94 96 %   01/19/18 0259 97.5 °F (36.4 °C) 100 18 146/81 96 %   01/18/18 2253 97.9 °F (36.6 °C) (!) 103 18 121/66 96 %   01/18/18 1959 97.6 °F (36.4 °C) 98 18 123/79 98 %   01/18/18 1459 97.6 °F (36.4 °C) 97 18 126/54 100 %       Intake/Output Summary (Last 24 hours) at 01/19/18 1302  Last data filed at 01/19/18 0805   Gross per 24 hour   Intake             1180 ml   Output             1250 ml   Net              -70 ml        PHYSICAL EXAM:  General: WD, WN. Alert, cooperative, no acute distress    EENT:  EOMI. Anicteric sclerae. MMM  Resp:  CTA bilaterally, no wheezing or rales. No accessory muscle use  CV:  Regular  rhythm,  No edema  GI:  Soft, Non distended, Non tender.  +Bowel sounds lap incisions noted  Neurologic:  Alert and oriented X 3, normal speech, grossly intact  Psych:   Good insight. Not anxious nor agitated  Skin:  No rashes. No jaundice    Reviewed most current lab test results and cultures  YES  Reviewed most current radiology test results   YES  Review and summation of old records today    NO  Reviewed patient's current orders and MAR    YES  PMH/SH reviewed - no change compared to H&P  ________________________________________________________________________  Care Plan discussed with:    Comments   Patient y    Family  y    RN y    Care Manager y    Consultant                        Multidiciplinary team rounds were held today with , nursing, pharmacist and clinical coordinator. Patient's plan of care was discussed; medications were reviewed and discharge planning was addressed. ________________________________________________________________________  Total NON critical care TIME:  30   Minutes    Total CRITICAL CARE TIME Spent:   Minutes non procedure based      Comments   >50% of visit spent in counseling and coordination of care     ________________________________________________________________________  Juanjo Lopez MD     Procedures: see electronic medical records for all procedures/Xrays and details which were not copied into this note but were reviewed prior to creation of Plan. LABS:  I reviewed today's most current labs and imaging studies.   Pertinent labs include:  Recent Labs      01/19/18   0246  01/18/18   0326  01/17/18   0306   WBC  13.0*  5.8  7.1   HGB  12.7  12.4  12.9   HCT  39.2  37.9  39.6   PLT  238  218  221     Recent Labs      01/19/18   0246  01/18/18   0326  01/17/18   0306   NA  139  141  140   K  4.4  3.9  4.1   CL  109*  110*  109*   CO2  22  25  24   GLU  164*  95  89   BUN  25*  16  23*   CREA  0.95  0.80  0.93   CA  8.5  8.5  8.7   MG  1.8  1.8  1.9   PHOS   --   2.9  4.0   ALB   --    --   2.3*   TBILI   --    --   1.1*   SGOT   --    --   151*   ALT   --    --   176*       Signed: Juanjo Lopez MD

## 2018-01-19 NOTE — PROGRESS NOTES
CRS POD#1 s/p lap left colectomy    Doing well. Pain controlled. No nausea or vomiting. No flatus or BM. BP (!) 129/94 (BP 1 Location: Right arm, BP Patient Position: At rest)  Pulse 97  Temp 97.9 °F (36.6 °C)  Resp 20  Ht 6' 4\" (1.93 m)  Wt 84.6 kg (186 lb 8.2 oz)  SpO2 96%  BMI 22.7 kg/m2    NAD, AAOx3  Abd soft, approp TTP, nondistended  Incisons c/d/i with mild erythema    Plan  -Continue full liquid diet. Await return of bowel function  -PRN IV dilaudid and PO oxycodone for pain control  -Heq sq for DVT prophylaxis.   Will hold off on restarting xarelto until discharge  -D/C meléndez  -Await path  -Discharge once passing gas/BM and pain controlled with PO pain meds

## 2018-01-19 NOTE — PROGRESS NOTES
Problem: Mobility Impaired (Adult and Pediatric)  Goal: *Acute Goals and Plan of Care (Insert Text)  Physical Therapy Goals  Initiated 1/19/2018  1. Patient will move from supine to sit and sit to supine , scoot up and down and roll side to side in bed with modified independence within 7 day(s). 2.  Patient will transfer from bed to chair and chair to bed with independence using the least restrictive device within 7 day(s). 3.  Patient will perform sit to stand with independence within 7 day(s). 4.  Patient will ambulate with modified independence for 100 feet with the least restrictive device within 7 day(s). 5.  Patient will ascend/descend 3 stairs with handrail(s) with supervision/set-up within 7 day(s). physical Therapy EVALUATION  Patient: Leola Wilson (82 y.o. male)  Date: 1/19/2018  Primary Diagnosis: Atrial fibrillation with RVR (HCC)  dysphagia, wt loss  COLON MASS   Procedure(s) (LRB):  LAPAROSCOPIC HAND ASSISTED LEFT COLECTOMY, CYSTO WITH INSERTION LEFT URETERAL STENT, AND REMOVAL LEFT URETERAL STENT (Left)  CYSTOSCOPY INSERTION LEFT URETERAL STENT (Left) 1 Day Post-Op   Precautions: Fall    ASSESSMENT :  Based on the objective data described below, the patient presents with increased abdominal/incisional pain, generalized weakness, impaired standing balance and decreased activity tolerance all limiting patients functional mobility. Patient tolerated in room ambulation to/from bathroom commode but mildly unsteady. RW placed in room post session and encouraged him to use RW with nursing assistance. Patient declined additional ambulation and sitting in chair at this time secondary to pain. Nurse informed of request for pain medication. Patient educated on the benefits of sitting up for all meals and continued mobility with nursing assist. He verbalized understanding and agreeable when pain improved. [facility] but patient declined stating he didn't need it.  Patient aware of process to arrange [facility] from home setting if need arises. Will continue to follow while admitted. Patient will benefit from skilled intervention to address the above impairments. Patients rehabilitation potential is considered to be Good  Factors which may influence rehabilitation potential include:   [x]         None noted  []         Mental ability/status  []         Medical condition  []         Home/family situation and support systems  []         Safety awareness  []         Pain tolerance/management  []         Other:      PLAN :  Recommendations and Planned Interventions:  [x]           Bed Mobility Training             []    Neuromuscular Re-Education  [x]           Transfer Training                   []    Orthotic/Prosthetic Training  [x]           Gait Training                         []    Modalities  [x]           Therapeutic Exercises           []    Edema Management/Control  [x]           Therapeutic Activities            [x]    Patient and Family Training/Education  []           Other (comment):    Frequency/Duration: Patient will be followed by physical therapy  5 times a week to address goals. Discharge Recommendations: Home Health PT but patient declined   Further Equipment Recommendations for Discharge: None expected      SUBJECTIVE:   Patient stated I am just hurting. Once the pain is better I will be fine.     OBJECTIVE DATA SUMMARY:   HISTORY:    Past Medical History:   Diagnosis Date    Arrhythmia     afib    Arthritis     Atrial fibrillation ([address] Dx 5/19/14    Dr Smitha Mckeon [phone]    Hypertension     Hypertrophy (benign) of prostate     Thromboembolus [facility]) 2003    s/p Lt knee surgery (was on Coumadin x3 yr)     Past Surgical History:   Procedure Laterality Date    COLONOSCOPY N/A 1/15/2018    COLONOSCOPY performed by Olivia Taveras MD at [facility]  1/15/2018         Monique Kaufman  1/15/2018         HX ORTHOPAEDIC      back surgery no hardware    HX ORTHOPAEDIC  2014    right knee surgery arthroscopy    HX ORTHOPAEDIC  3/23/15    REVISION TO LEFT TOTAL KNEE REPLACEMENT    HX PACEMAKER  8/2014    3 wire    TOTAL KNEE ARTHROPLASTY  2003    Left    TOTAL KNEE ARTHROPLASTY  2009    Right    UPPER GI ENDOSCOPY,BIOPSY  1/15/2018          Prior Level of Function/Home Situation: prior independence, no DME used at baseline  Personal factors and/or comorbidities impacting plan of care:     Home Situation  Home Environment: Private residence  # Steps to Enter: 3  Rails to Enter: Yes  Hand Rails : Bilateral  One/Two Story Residence: One story  Living Alone: No  Support Systems: Spouse/Significant Other/Partner, Family member(s)  Patient Expects to be Discharged to[de-identified] Private residence  Current DME Used/Available at Home: [place], straight, Walker, rolling, Commode, bedside, Grab bars, Shower chair  Tub or Shower Type: Shower    EXAMINATION/PRESENTATION/DECISION MAKING:   Critical Behavior:  Neurologic State: Alert  Orientation Level: Oriented X4  Cognition: Appropriate decision making     Hearing: Auditory  Auditory Impairment: Hard of hearing, bilateral  Hearing Aids/Status: Does not own    Range Of Motion:  AROM: Generally decreased, functional (limited secondary to abdominal pain)     Strength:    Strength: Generally decreased, functional     Tone & Sensation:   Tone: Normal  Sensation: Intact      Coordination:  Coordination: Within functional limits    Functional Mobility:  Bed Mobility:  Rolling: Supervision; Additional time (use of bed rail)  Supine to Sit: Supervision; Additional time  Sit to Supine: Supervision; Additional time  Scooting: Supervision; Additional time  Transfers:  Sit to Stand: Contact guard assistance; Additional time  Stand to Sit: Contact guard assistance; Additional time     Balance:   Sitting: Intact  Standing: Impaired  Standing - Static: Fair  Standing - Dynamic : Fair  Ambulation/Gait Training:  Distance (ft): 12 Feet (ft) (x 2 reps; to/from bathroom)  Assistive Device:  (intermittent HHA)  Ambulation - Level of Assistance: Contact guard assistance;Minimal assistance  Gait Abnormalities: Decreased step clearance  Base of Support: Widened  Speed/Eliz: Slow  Step Length: Right shortened;Right lengthened      Patient slow, mildly unsteady. Recommend use of RW and device left in patients room      Functional Measure:  Barthel Index:    Bathin  Bladder: 10  Bowels: 10  Groomin  Dressin  Feeding: 10  Mobility: 0  Stairs: 5  Toilet Use: 5  Transfer (Bed to Chair and Back): 10  Total: 60     Barthel and G-code impairment scale:  Percentage of impairment CH  0% CI  1-19% CJ  20-39% CK  40-59% CL  60-79% CM  80-99% CN  100%   Barthel Score 0-100 100 99-80 79-60 59-40 20-39 1-19   0   Barthel Score 0-20 20 17-19 13-16 9-12 5-8 1-4 0      The Barthel ADL Index: Guidelines  1. The index should be used as a record of what a patient does, not as a record of what a patient could do. 2. The main aim is to establish degree of independence from any help, physical or verbal, however minor and for whatever reason. 3. The need for supervision renders the patient not independent. 4. A patient's performance should be established using the best available evidence. Asking the patient, friends/relatives and nurses are the usual sources, but direct observation and common sense are also important. However direct testing is not needed. 5. Usually the patient's performance over the preceding 24-48 hours is important, but occasionally longer periods will be relevant. 6. Middle categories imply that the patient supplies over 50 per cent of the effort. 7. Use of aids to be independent is allowed. Ophelia Horn., Barthel, D.W. (4184). Functional evaluation: the Barthel Index. [facility] (14)2. RIVER Denson, Brooke Ortiz., Jaxon Garcia., [facility], [address] [place] [address] ().  Measuring the change indisability after inpatient rehabilitation; comparison of the responsiveness of the Barthel Index and Functional [place] Measure. Journal of Neurology, Neurosurgery, and Psychiatry, 66(4), 321-106. JEFFRY Garcia.A, DIOR Arshad, & Makayla Valadez M.A. (2004.) Assessment of post-stroke quality of life in cost-effectiveness studies: The usefulness of the Barthel Index and the EuroQoL-5D. Quality of Life Research, 13, 244-35     G codes: In compliance with CMSs Claims Based Outcome Reporting, the following G-code set was chosen for this patient based on their primary functional limitation being treated: The outcome measure chosen to determine the severity of the functional limitation was the Barthel Index with a score of 60/100 which was correlated with the impairment scale.     ? Mobility - Walking and Moving Around:     - CURRENT STATUS: CJ - 20%-39% impaired, limited or restricted    - GOAL STATUS: CI - 1%-19% impaired, limited or restricted    - D/C STATUS:  ---------------To be determined---------------      Physical Therapy Evaluation Charge Determination   History Examination Presentation Decision-Making   LOW Complexity : Zero comorbidities / personal factors that will impact the outcome / POC LOW Complexity : 1-2 Standardized tests and measures addressing body structure, function, activity limitation and / or participation in recreation  LOW Complexity : Stable, uncomplicated  Other outcome measures Barthel Index  LOW       Based on the above components, the patient evaluation is determined to be of the following complexity level: LOW     Pain:  Pain Scale 1: Numeric (0 - 10)  Pain Intensity 1: 7  Pain Location 1: Abdomen  Pain Orientation 1: Mid  Pain Description 1: Aching  Pain Intervention(s) 1: Medication (see MAR)  Activity Tolerance:   No s/s of VS distress   After treatment:   []         Patient left in no apparent distress sitting up in chair  [x]         Patient left in no apparent distress in bed  [x] Call bell left within reach  [x]         Nursing notified  []         Caregiver present  []         Bed alarm activated    COMMUNICATION/EDUCATION:   The patients plan of care was discussed with: Registered Nurse. [x]         Fall prevention education was provided and the patient/caregiver indicated understanding. [x]         Patient/family have participated as able in goal setting and plan of care. [x]         Patient/family agree to work toward stated goals and plan of care. []         Patient understands intent and goals of therapy, but is neutral about his/her participation. []         Patient is unable to participate in goal setting and plan of care.     Thank you for this referral.  Genesis Moura, PT, DPT   Time Calculation: 17 mins

## 2018-01-19 NOTE — PROGRESS NOTES
Progress Note      1/19/2018 7:18 PM  NAME: Quita Arnett   MRN:  [id number]   Admit Diagnosis: Atrial fibrillation with RVR ([address]  dysphagia, wt loss  COLON MASS      Assessment:     1. Weight loss, difficulty swallowing, CT of abd/pelvis unrevealing, after bowel prep found to be in Afib with RVR  2. Stress 2014 with no ischemia  3. Hx of cardiomyopathy with EF 30% s/p BiV ICD, last EF normalized  4. Hx of PAF s/p cardioversion had been in sinus on xarelto and amiodarone  5. HTN  6. CKD  7. S/p bilteral Knee replacement, hx of prosthesis infection    Severe weight loss, >40lbs, difficulty swallowing, nausea, undergoing GI evaluation. Here to have EGD and Colonoscopy. Found to be in afib with RVR  with dyspnea. Procedures canceled and pt admitted for further management. 6. 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 Hared to tell . In any case the rate is controlled. OK to proceed with studies tomorrow. 1/15 Endoscopy shows ulceration of the distal esophagus. Colonoscopy showed a mass in the descending Colon which is c/w colon CA. Was biopsied. For colorectal consult. Back in A fib. Rate is controlled. Continue on lovenox and transition back to Xarelto when OK     1/16 HR is still up a little. Add Cardizem for rate control      1/17 Seen in AM on rounds . palns for surgery noted . Rate controlled. Still in A fib . Can use IV Cardizem if needed for rate control robles op. 1/19 s/p partial colectomy. Remains in A Fib with controlled rate. He is having abdominal pain and has not been oob much. On liquid diet            Plan:     Continue on meds. [x]        High complexity decision making was performed    Subjective:     Quita Arnett denies chest pain, dyspnea. Discussed with RN events overnight.      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 ([facility] [address] T84.50XA    DJD (degenerative joint disease) of knee M17.10    Atrial fibrillation with RVR (HCC) I48.91    Weight loss R63.4       Review of Systems:    Symptom Y/N Comments  Symptom Y/N Comments   Fever/Chills N   Chest Pain N    Poor Appetite N   Edema N    Cough N   Abdominal Pain N    Sputum N   Joint Pain N    SOB/GARCIA N   Pruritis/Rash N    Nausea/vomit N   Tolerating PT/OT Y    Diarrhea N   Tolerating Diet Y    Constipation N   Other       Could NOT obtain due to:      Objective:      Physical Exam:    Last 24hrs VS reviewed since prior progress note. Most recent are:    Visit Vitals    /67 (BP 1 Location: Right arm, BP Patient Position: At rest)    Pulse 93    Temp 97.5 °F (36.4 °C)    Resp 20    Ht 6' 4\" (1.93 m)    Wt 84.6 kg (186 lb 8.2 oz)    SpO2 98%    BMI 22.7 kg/m2       Intake/Output Summary (Last 24 hours) at 01/19/18 1519  Last data filed at 01/19/18 1130   Gross per 24 hour   Intake             1180 ml   Output             1400 ml   Net             -220 ml        General Appearance: Well developed, well nourished, alert & oriented x 3,    no acute distress. Ears/Nose/Mouth/Throat: Hearing grossly normal.  Neck: Supple. Chest: Lungs clear to auscultation bilaterally. Cardiovascular: Regular rate and rhythm, S1S2 normal, no murmur. Abdomen: Soft, non-tender, bowel sounds are active. Extremities: No edema bilaterally. Skin: Warm and dry.     PMH/SH reviewed - no change compared to H&P    Data Review    Telemetry: normal sinus rhythm     Lab Data Personally Reviewed:    Recent Labs      01/19/18   0246  01/18/18   0326   WBC  13.0*  5.8   HGB  12.7  12.4   HCT  39.2  37.9   PLT  238  218   LABRCNT(INR:3,PTP:3,APTT:3,)  Recent Labs      01/19/18   0246  01/18/18   0326  01/17/18   0306 NA  139  141  140   K  4.4  3.9  4.1   CL  109*  110*  109*   CO2  22  25  24   BUN  25*  16  23*   CREA  0.95  0.80  0.93   GLU  164*  95  89   CA  8.5  8.5  8.7   MG  1.8  1.8  1.9   LABRCNT(CPK:3,CpKMB:3,ckndx:3,troiq:3)No results found for: CHOL, CHOLX, CHLST, CHOLV, HDL, LDL, LDLC, DLDLP, TGLX, TRIGL, TRIGP, CHHD, CHHDXLABRCNT(sgot:3,gpt:3,ap:3,tbiL:3,TP:3,ALB:3,GLOB:3,ggt:3,aml:3,amyp:3,lpse:3,hlpse:3)No results for input(s): PH, PCO2, PO2 in the last 72 hours. No results found for: CHOL, CHOLX, CHLST, CHOLV, HDL, LDL, LDLC, DLDLP, TGLX, TRIGL, TRIGP, CHHD, CHHDXMEDTABLETimmali Gonsales MD  No results for input(s): PH, PCO2, PO2 in the last 72 hours.     Medications Personally Reviewed:    Current Facility-Administered Medications   Medication Dose Route Frequency    alvimopan (ENTEREG) capsule 12 mg  12 mg Oral BID    HYDROmorphone (DILAUDID) injection 0.5 mg  0.5 mg IntraVENous Q3H PRN    oxyCODONE IR (ROXICODONE) tablet 5 mg  5 mg Oral Q4H PRN    oxyCODONE IR (ROXICODONE) tablet 10 mg  10 mg Oral Q4H PRN    heparin (porcine) injection 5,000 Units  5,000 Units SubCUTAneous Q12H    dilTIAZem CD (CARDIZEM CD) capsule 180 mg  180 mg Oral DAILY    amiodarone (CORDARONE) tablet 400 mg  400 mg Oral DAILY    metoprolol succinate (TOPROL-XL) XL tablet 50 mg  50 mg Oral DAILY    valsartan (DIOVAN) tablet 80 mg  80 mg Oral DAILY    sodium chloride (NS) flush 5-10 mL  5-10 mL IntraVENous Q8H    sodium chloride (NS) flush 5-10 mL  5-10 mL IntraVENous PRN    acetaminophen (TYLENOL) tablet 650 mg  650 mg Oral Q6H PRN    ondansetron (ZOFRAN) injection 4 mg  4 mg IntraVENous Q6H PRN    famotidine (PEPCID) tablet 20 mg  20 mg Oral BID         Dominique Zimmer MD

## 2018-01-19 NOTE — PROGRESS NOTES
SHIFT SUMMARY:  0800: D/C meléndez catheter per orders    1130: Patient able to void s/p meléndez dc    Bedside shift change report given to MANUEL Bledsoe (oncoming nurse) by Sherran Duane (offgoing nurse). Report included the following information SBAR, Kardex, Intake/Output, MAR and Recent Results. [facility] NURSING NOTE   Admission Date 1/12/2018   Admission Diagnosis Atrial fibrillation with RVR (HCC)  dysphagia, wt loss  COLON MASS    Consults IP CONSULT TO GASTROENTEROLOGY  IP CONSULT TO COLORECTAL SURGERY      Cardiac Monitoring [x] Yes [] No      Purposeful Hourly Rounding [x] Yes    Darius Score Total Score: 1   Darius score 3 or > [x] Bed Alarm [] Avasys [] 1:1 sitter [] Patient refused (Place signed refusal form in chart)   Tr Score Tr Score: 21   Tr score 14 or < [] PMT consult [] Wound Care consult    []  Specialty bed  [] Nutrition consult      Influenza Vaccine Received Flu Vaccine for Current Season (usually Sept-March): Yes    Patient/Guardian Refused (Notify MD): No      Oxygen needs? [x] Room air Oxygen @  []1L    []2L    []3L   []4L    []5L   []6L     Use home O2? [] Yes [x] No  Perform O2 challenge test using  smartphrase (.Homeoxygen)      Last bowel movement Last Bowel Movement Date: 01/18/18      Urinary Catheter [REMOVED] Urinary Catheter 01/18/18 2- way; Meléndez-Criteria for Appropriate Use: Surgical procedure     [REMOVED] Urinary Catheter 01/18/18 2- way; Meléndez-Urine Output (mL): 200 ml     LDAs               Peripheral IV 01/12/18 Right Antecubital (Active)   Site Assessment Clean, dry, & intact 1/19/2018  8:05 AM   Phlebitis Assessment 0 1/19/2018  8:05 AM   Infiltration Assessment 0 1/19/2018  8:05 AM   Dressing Status Clean, dry, & intact 1/19/2018  8:05 AM   Dressing Type Tape;Transparent 1/19/2018  8:05 AM   Hub Color/Line Status Pink 1/19/2018  8:05 AM   Action Taken Blood drawn 1/12/2018 11:20 AM       Peripheral IV 01/12/18 Left Forearm (Active)   Site Assessment Clean, dry, & intact 1/19/2018  8:05 AM   Phlebitis Assessment 0 1/19/2018  8:05 AM   Infiltration Assessment 0 1/19/2018  8:05 AM   Dressing Status Clean, dry, & intact 1/19/2018  8:05 AM   Dressing Type Tape;Transparent 1/19/2018  8:05 AM   Hub Color/Line Status Flushed 1/19/2018  8:05 AM                         Readmission Risk Assessment Tool Score Low Risk            11       Total Score        3 Has Seen PCP in Last 6 Months (Yes=3, No=0)    3 Patient Length of Stay (>5 days = 3)    5 Pt. Coverage (Medicare=5 , Medicaid, or Self-Pay=4)        Criteria that do not apply:    . Living with Significant Other. Assisted Living. LTAC. SNF.  or   Rehab    IP Visits Last 12 Months (1-3=4, 4=9, >4=11)    Charlson Comorbidity Score (Age + Comorbid Conditions)       Expected Length of Stay 6d 7h   Actual Length of Stay 7

## 2018-01-19 NOTE — PROGRESS NOTES
Nutrition Assessment:    INTERVENTIONS/RECOMMENDATIONS:   Diet progression per CRS, consider low fiber  Ensure enlive TID (vanilla)     ASSESSMENT:   Chart reviewed, s/p lap left colectomy. Currently on full liquids and tolerate well. Good appetite consuming 100% of meals and ONS. Pt agreed to transition to ensure enlive from ensure clear to provide more kcal and protein. Will provide pt with low fiber diet education before discharge. Diet Order: Full liquids  % Eaten:  Patient Vitals for the past 72 hrs:   % Diet Eaten   01/17/18 1538 100 %   01/17/18 0831 100 %     Pertinent Medications: [x]Reviewed: pepcid  Pertinent Labs: [x]Reviewed:   Food Allergies: [x]NKFA  []Other   Last BM:  1/18  Edema:      []RUE   []LUE   []RLE   []LLE      Pressure Ulcer:      [] Stage I   [] Stage II   [] Stage III   [] Stage IV      Anthropometrics: Height: 6' 4\" (193 cm) Weight: 84.6 kg (186 lb 8.2 oz)    IBW (%IBW):   ( ) UBW (%UBW):   (  %)    BMI: Body mass index is 22.7 kg/(m^2). This BMI is indicative of:  []Underweight   [x]Normal   []Overweight   [] Obesity   [] Extreme Obesity (BMI>40)  Last Weight Metrics:  Weight Loss Metrics 1/18/2018 1/12/2018 3/23/2015 3/19/2015 3/10/2015 2/23/2015 1/29/2015   Today's Wt 186 lb 8.2 oz - 223 lb 223 lb 6 oz 234 lb 225 lb 234 lb   BMI - 22.7 kg/m2 27.16 kg/m2 27.2 kg/m2 28.5 kg/m2 27.4 kg/m2 28.5 kg/m2       Estimated Nutrition Needs (Based on): 2225 Kcals/day (BMR: 1710 x 1.3) , 105 g (1.2 g/kg) Protein  Carbohydrate:  At Least 130 g/day  Fluids: 2225 mL/day or per primary team    NUTRITION DIAGNOSES:   Problem:  Inadequate protein-energy intake      Etiology: related to vomiting and dysphagia     Signs/Symptoms: as evidenced by 17% wt loss x 5 months      NUTRITION INTERVENTIONS:  Meals/Snacks: General/healthful diet   Supplements: Commercial supplement              GOAL:   consume >50% of meals and ONS in 2-4 days    NUTRITION MONITORING AND EVALUATION      Food/Nutrient Intake Outcomes:  Total energy intake  Physical Signs/Symptoms Outcomes: Weight/weight change, Growth pattern, Electrolyte and renal profile, GI    Previous Goal Met:   [x] Met              [] Progressing Towards Goal              [] Not Progressing Towards Goal   Previous Recommendations:   [] Implemented          [] Not Implemented          [x] Not Applicable    LEARNING NEEDS (Diet, Food/Nutrient-Drug Interaction):    [x] None Identified   [] Identified and Education Provided/Documented   [] Identified and Pt declined/was not appropriate     Cultural, Congregation, OR Ethnic Dietary Needs:    [x] None Identified   [] Identified and Addressed     [x] Interdisciplinary Care Plan Reviewed/Documented    [x] Discharge Planning: low fiber diet 4-6 weeks   [] Participated in Interdisciplinary Rounds    NUTRITION RISK:    [x] High      to        [x] Moderate           []  Low  []  Minimal/Uncompromised      Jordon Cutler RDN  Pager [phone]  Weekend Pager [phone]

## 2018-01-19 NOTE — PROGRESS NOTES
F/U for colon mass    S: Mr. You Bergman was seen by me today during rounds. At this time, he is resting + comfortably. He walked with assistance to the bathroom and is sitting on the daybed. The patient has no new complaints today. Please see admission consult for details of ROS; there are no other changes today. O: Blood pressure (!) 129/94, pulse 97, temperature 97.9 °F (36.6 °C), resp. rate 20, height 6' 4\" (1.93 m), weight 84.6 kg (186 lb 8.2 oz), SpO2 96 %. Gen: Patient is in no acute distress. There is no jaundice. Lungs: Clear to auscultation bilaterally . Heart:+RRR. Abd: Soft, non tender, non-distended, bowel sounds present. Extremities: Warm. Lab Results   Component Value Date/Time    WBC 13.0 01/19/2018 02:46 AM    Hemoglobin (POC) 12.6 01/12/2015 02:27 PM    HGB 12.7 01/19/2018 02:46 AM    Hematocrit (POC) 37 01/12/2015 02:27 PM    HCT 39.2 01/19/2018 02:46 AM    PLATELET 125 17/45/4680 02:46 AM    MCV 84.8 01/19/2018 02:46 AM     Lab Results   Component Value Date/Time    Sodium 139 01/19/2018 02:46 AM    Potassium 4.4 01/19/2018 02:46 AM    Chloride 109 01/19/2018 02:46 AM    CO2 22 01/19/2018 02:46 AM    Anion gap 8 01/19/2018 02:46 AM    Glucose 164 01/19/2018 02:46 AM    BUN 25 01/19/2018 02:46 AM    Creatinine 0.95 01/19/2018 02:46 AM    BUN/Creatinine ratio 26 01/19/2018 02:46 AM    GFR est AA >60 01/19/2018 02:46 AM    GFR est non-AA >60 01/19/2018 02:46 AM    Calcium 8.5 01/19/2018 02:46 AM    Bilirubin, total 1.1 01/17/2018 03:06 AM    AST (SGOT) 151 01/17/2018 03:06 AM    Alk.  phosphatase 136 01/17/2018 03:06 AM    Protein, total 5.8 01/17/2018 03:06 AM    Albumin 2.3 01/17/2018 03:06 AM    Globulin 3.5 01/17/2018 03:06 AM    A-G Ratio 0.7 01/17/2018 03:06 AM    ALT (SGPT) 176 01/17/2018 03:06 AM    pathology pending  Cross sectional imaging:  None new    A: Principal Problem:    Atrial fibrillation with RVR ([address] (1/12/2018)    Active Problems:    Weight loss (1/12/2018)        Comment:  I reviewed Dr Gracia's note  P:  ppi  I will see in follow up in a month or two (or [facility])  I will make him seen on request for this weekend 1.20, 1.21    Radha Flowers MD  9:55 AM  1/19/2018

## 2018-01-20 LAB
ANION GAP SERPL CALC-SCNC: 8 MMOL/L (ref 5–15)
BASOPHILS # BLD: 0 K/UL (ref 0–0.1)
BASOPHILS NFR BLD: 0 % (ref 0–1)
BUN SERPL-MCNC: 27 MG/DL (ref 6–20)
BUN/CREAT SERPL: 38 (ref 12–20)
CALCIUM SERPL-MCNC: 8.3 MG/DL (ref 8.5–10.1)
CHLORIDE SERPL-SCNC: 106 MMOL/L (ref 97–108)
CO2 SERPL-SCNC: 22 MMOL/L (ref 21–32)
CREAT SERPL-MCNC: 0.71 MG/DL (ref 0.7–1.3)
EOSINOPHIL # BLD: 0 K/UL (ref 0–0.4)
EOSINOPHIL NFR BLD: 0 % (ref 0–7)
ERYTHROCYTE [DISTWIDTH] IN BLOOD BY AUTOMATED COUNT: 15.8 % (ref 11.5–14.5)
GLUCOSE SERPL-MCNC: 108 MG/DL (ref 65–100)
HCT VFR BLD AUTO: 37.2 % (ref 36.6–50.3)
HGB BLD-MCNC: 12.4 G/DL (ref 12.1–17)
LYMPHOCYTES # BLD: 1 K/UL (ref 0.8–3.5)
LYMPHOCYTES NFR BLD: 10 % (ref 12–49)
MAGNESIUM SERPL-MCNC: 1.9 MG/DL (ref 1.6–2.4)
MCH RBC QN AUTO: 28.6 PG (ref 26–34)
MCHC RBC AUTO-ENTMCNC: 33.3 G/DL (ref 30–36.5)
MCV RBC AUTO: 85.9 FL (ref 80–99)
MONOCYTES # BLD: 1 K/UL (ref 0–1)
MONOCYTES NFR BLD: 10 % (ref 5–13)
NEUTS SEG # BLD: 8.3 K/UL (ref 1.8–8)
NEUTS SEG NFR BLD: 80 % (ref 32–75)
PHOSPHATE SERPL-MCNC: 2.7 MG/DL (ref 2.6–4.7)
PLATELET # BLD AUTO: 203 K/UL (ref 150–400)
POTASSIUM SERPL-SCNC: 4.7 MMOL/L (ref 3.5–5.1)
RBC # BLD AUTO: 4.33 M/UL (ref 4.1–5.7)
SODIUM SERPL-SCNC: 136 MMOL/L (ref 136–145)
WBC # BLD AUTO: 10.3 K/UL (ref 4.1–11.1)

## 2018-01-20 PROCEDURE — 80048 BASIC METABOLIC PNL TOTAL CA: CPT | Performed by: SURGERY

## 2018-01-20 PROCEDURE — 36415 COLL VENOUS BLD VENIPUNCTURE: CPT | Performed by: SURGERY

## 2018-01-20 PROCEDURE — 84100 ASSAY OF PHOSPHORUS: CPT | Performed by: SURGERY

## 2018-01-20 PROCEDURE — 74011250636 HC RX REV CODE- 250/636: Performed by: SURGERY

## 2018-01-20 PROCEDURE — 74011250637 HC RX REV CODE- 250/637: Performed by: SURGERY

## 2018-01-20 PROCEDURE — 85025 COMPLETE CBC W/AUTO DIFF WBC: CPT | Performed by: SURGERY

## 2018-01-20 PROCEDURE — 74011250637 HC RX REV CODE- 250/637: Performed by: INTERNAL MEDICINE

## 2018-01-20 PROCEDURE — 83735 ASSAY OF MAGNESIUM: CPT | Performed by: SURGERY

## 2018-01-20 PROCEDURE — 74011250637 HC RX REV CODE- 250/637: Performed by: HOSPITALIST

## 2018-01-20 PROCEDURE — 65660000000 HC RM CCU STEPDOWN

## 2018-01-20 RX ADMIN — DILTIAZEM HYDROCHLORIDE 180 MG: 180 CAPSULE, COATED, EXTENDED RELEASE ORAL at 09:53

## 2018-01-20 RX ADMIN — FAMOTIDINE 20 MG: 20 TABLET, FILM COATED ORAL at 17:57

## 2018-01-20 RX ADMIN — OXYCODONE HYDROCHLORIDE 10 MG: 5 TABLET ORAL at 12:26

## 2018-01-20 RX ADMIN — FAMOTIDINE 20 MG: 20 TABLET, FILM COATED ORAL at 09:53

## 2018-01-20 RX ADMIN — VALSARTAN 80 MG: 40 TABLET, FILM COATED ORAL at 09:53

## 2018-01-20 RX ADMIN — Medication 10 ML: at 20:24

## 2018-01-20 RX ADMIN — ALVIMOPAN 12 MG: 12 CAPSULE ORAL at 10:43

## 2018-01-20 RX ADMIN — OXYCODONE HYDROCHLORIDE 10 MG: 5 TABLET ORAL at 20:23

## 2018-01-20 RX ADMIN — METOPROLOL SUCCINATE 50 MG: 50 TABLET, EXTENDED RELEASE ORAL at 09:53

## 2018-01-20 RX ADMIN — ALVIMOPAN 12 MG: 12 CAPSULE ORAL at 17:57

## 2018-01-20 RX ADMIN — Medication 10 ML: at 14:00

## 2018-01-20 RX ADMIN — HEPARIN SODIUM 5000 UNITS: 5000 INJECTION, SOLUTION INTRAVENOUS; SUBCUTANEOUS at 20:23

## 2018-01-20 RX ADMIN — AMIODARONE HYDROCHLORIDE 400 MG: 200 TABLET ORAL at 09:53

## 2018-01-20 RX ADMIN — OXYCODONE HYDROCHLORIDE 10 MG: 5 TABLET ORAL at 04:53

## 2018-01-20 RX ADMIN — HEPARIN SODIUM 5000 UNITS: 5000 INJECTION, SOLUTION INTRAVENOUS; SUBCUTANEOUS at 09:53

## 2018-01-20 NOTE — PROGRESS NOTES
Hospitalist Progress Note    NAME: Anita Boogie   :  1945   MRN:  [id number]       Assessment / Plan:  Atrial fib with RVR, HTN and CKD. Cardiology following, considering KIM and cardioversion  Hx of cardiomyopathy with EF 30% s/p BiV ICD, last EF normalized  Hx of PAF s/p cardioversion had been in sinus on xarelto and amiodarone  Holding xarelto until discharge per surgery  Started heparin subq    Weight loss. Intermittent vomiting  Colon mass, gastritis, duodenitis  GI/surgery  Inputs and recommendations appreciated. S/p Egd/colonoscopy, EGD with esophageal ulcers on peocid, colonoscopy with suspected colon cancer, tubular and villous adnoma  Path reports reviewed. S/p Cystoscopy with insertion of left ureteral stent and laparoscopic lt colectomy  Advance diet as tolerated   Path Villous adenoma, 3.0 cm, with focal high-grade dysplasia; negative for invasive carcinoma   Regional lymph nodes: Twenty-seven (27) reactive lymph nodes, negative for carcinoma   Tubular adenoma (0.8 cm), Biopsy sites, at least one associated with submucosal abscess     CT chest. Needs OP follow up. Centrilobular emphysema, 4.6 cm fusiform ascending aortic aneurysm,  interstitial septal thickening which may reflect mild edema or chronic  interstitial disease.      Acutely elevated LFTs  Hx hepatic steatosis  No abdominal pain  Follow up LFT , slowly trending down. Unremarkable CT through the abdomen and pelvis on 2017. GI on board .     Aspiration with thin liquids on MBS  Boscobel thick liquids once resume diet  S/p  EGD ,Small proximal antral nodule--submucosal  Distal esophageal ulcers,Successful empiric dilation of the ge junction. Pepcid     Body mass index is 23.21 kg/(m^2). Body mass index is 22.7 kg/(m^2).     Code status: Full  Prophylaxis: heparin  Recommended Disposition: Home w/Family     Subjective:     Chief Complaint / Reason for Physician Visit  Feeling fine, Discussed with RN events overnight. Review of Systems:  Symptom Y/N Comments  Symptom Y/N Comments   Fever/Chills n   Chest Pain n    Poor Appetite n   Edema n    Cough n   Abdominal Pain n    Sputum n   Joint Pain n    SOB/GARCIA n   Pruritis/Rash n    Nausea/vomit n   Tolerating PT/OT     Diarrhea n   Tolerating Diet     Constipation n   Other       Could NOT obtain due to:      Objective:     VITALS:   Last 24hrs VS reviewed since prior progress note. Most recent are:  Patient Vitals for the past 24 hrs:   Temp Pulse Resp BP SpO2   01/20/18 1104 98 °F (36.7 °C) 95 16 102/66 98 %   01/20/18 0742 97.4 °F (36.3 °C) 100 18 112/75 94 %   01/20/18 0356 98 °F (36.7 °C) (!) 102 20 114/81 93 %   01/19/18 2313 97.4 °F (36.3 °C) 100 20 140/81 94 %   01/19/18 1956 97.7 °F (36.5 °C) (!) 102 20 115/67 97 %   01/19/18 1504 97.5 °F (36.4 °C) 93 20 108/67 98 %       Intake/Output Summary (Last 24 hours) at 01/20/18 1304  Last data filed at 01/20/18 0742   Gross per 24 hour   Intake              740 ml   Output                0 ml   Net              740 ml        PHYSICAL EXAM:  General: WD, WN. Alert, cooperative, no acute distress    EENT:  EOMI. Anicteric sclerae. MMM  Resp:  CTA bilaterally, no wheezing or rales. No accessory muscle use  CV:  Regular  rhythm,  No edema  GI:  Soft, Non distended, Non tender.  +Bowel sounds lap incisions noted  Neurologic:  Alert and oriented X 3, normal speech, grossly intact  Psych:   Good insight. Not anxious nor agitated  Skin:  No rashes.   No jaundice    Reviewed most current lab test results and cultures  YES  Reviewed most current radiology test results   YES  Review and summation of old records today    NO  Reviewed patient's current orders and MAR    YES  PMH/SH reviewed - no change compared to H&P  ________________________________________________________________________  Care Plan discussed with:    Comments   Patient y    Family  y    RN y    Care Manager y    Consultant Multidiciplinary team rounds were held today with , nursing, pharmacist and clinical coordinator. Patient's plan of care was discussed; medications were reviewed and discharge planning was addressed. ________________________________________________________________________  Total NON critical care TIME:  30   Minutes    Total CRITICAL CARE TIME Spent:   Minutes non procedure based      Comments   >50% of visit spent in counseling and coordination of care     ________________________________________________________________________  Yokasta Roberts MD     Procedures: see electronic medical records for all procedures/Xrays and details which were not copied into this note but were reviewed prior to creation of Plan. LABS:  I reviewed today's most current labs and imaging studies.   Pertinent labs include:  Recent Labs      01/20/18   0418  01/19/18   0246  01/18/18   0326   WBC  10.3  13.0*  5.8   HGB  12.4  12.7  12.4   HCT  37.2  39.2  37.9   PLT  203  238  218     Recent Labs      01/20/18   0418  01/19/18   0246  01/18/18   0326   NA  136  139  141   K  4.7  4.4  3.9   CL  106  109*  110*   CO2  22  22  25   GLU  108*  164*  95   BUN  27*  25*  16   CREA  0.71  0.95  0.80   CA  8.3*  8.5  8.5   MG  1.9  1.8  1.8   PHOS  2.7   --   2.9       Signed: Yokasta Roberts MD

## 2018-01-20 NOTE — PROGRESS NOTES
Progress Note      1/20/2018 7:18 PM  NAME: Chelo Rodriguez   MRN:  [id number]   Admit Diagnosis: Atrial fibrillation with RVR ([address]  dysphagia, wt loss  COLON MASS      Assessment:     1. Weight loss, difficulty swallowing, CT of abd/pelvis unrevealing, after bowel prep found to be in Afib with RVR  2. Stress 2014 with no ischemia  3. Hx of cardiomyopathy with EF 30% s/p BiV ICD, last EF normalized  4. Hx of PAF s/p cardioversion had been in sinus on xarelto and amiodarone  5. HTN  6. CKD  7. S/p bilteral Knee replacement, hx of prosthesis infection    Severe weight loss, >40lbs, difficulty swallowing, nausea, undergoing GI evaluation. Here to have EGD and Colonoscopy. Found to be in afib with RVR  with dyspnea. Procedures canceled and pt admitted for further management. 6. 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 Hared to tell . In any case the rate is controlled. OK to proceed with studies tomorrow. 1/15 Endoscopy shows ulceration of the distal esophagus. Colonoscopy showed a mass in the descending Colon which is c/w colon CA. Was biopsied. For colorectal consult. Back in A fib. Rate is controlled. Continue on lovenox and transition back to Xarelto when OK     1/16 HR is still up a little. Add Cardizem for rate control      1/17 Seen in AM on rounds . palns for surgery noted . Rate controlled. Still in A fib . Can use IV Cardizem if needed for rate control robles op. 1/19 s/p partial colectomy. Remains in A Fib with controlled rate. He is having abdominal pain and has not been oob much. On liquid diet        1/20:  GARCIA is new; post-op vs AFib contribution. Remains in AFib; intermittent BiV pacing; rate controlled. On Sq heparin; resume xarelto at surgeons discretion. Cont dilt/metop/amio. UOOB. Can consider KIM/DCCV is remains symptomatic. Plan:     Continue on meds. [x]        High complexity decision making was performed    Subjective:     Michelle Rob denies chest pain. ABD pain better. GARCIA is new. Discussed with RN events overnight. 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 Most recent are:    Visit Vitals    /66 (BP 1 Location: Right arm, BP Patient Position: At rest)    Pulse 95    Temp 98 °F (36.7 °C)    Resp 16    Ht 6' 4\" (1.93 m)    Wt 84.6 kg (186 lb 8.2 oz)    SpO2 98%    BMI 22.7 kg/m2       Intake/Output Summary (Last 24 hours) at 01/20/18 1204  Last data filed at 01/20/18 0742   Gross per 24 hour   Intake              740 ml   Output                0 ml   Net              740 ml        General Appearance: Well developed, well nourished, alert & oriented x 3,    no acute distress. Ears/Nose/Mouth/Throat: Hearing grossly normal.  Neck: Supple. Chest: Lungs clear to auscultation bilaterally. Cardiovascular: Irregular rate and rhythm, S1S2 normal, no murmur. Abdomen: Soft, non-tender, bowel sounds are active. Extremities: No edema bilaterally. Skin: Warm and dry.     PMH/SH reviewed - no change compared to H&P    Data Review    Telemetry: AFib w/ intermittent BiV pacing    Lab Data Personally Reviewed:    Recent Labs      01/20/18 0418 01/19/18   0246   WBC  10.3  13.0*   HGB  12.4  12.7   HCT  37.2  39.2   PLT  203  238   LABRCNT(INR:3,PTP:3,APTT:3,)  Recent Labs      01/20/18 0418 01/19/18   0246  01/18/18   0326   NA  136  139  141   K  4.7  4.4  3.9   CL  106  109*  110*   CO2  22  22  25   BUN  27*  25*  16   CREA  0.71  0.95  0.80   GLU  108*  164*  95   CA  8.3*  8.5  8.5   MG  1.9  1.8  1.8   LABRCNT(CPK:3,CpKMB:3,ckndx:3,troiq:3)No results found for: CHOL, CHOLX, CHLST, CHOLV, HDL, LDL, LDLC, DLDLP, TGLX, TRIGL, TRIGP, CHHD, CHHDXLABRCNT(sgot:3,gpt:3,ap:3,tbiL:3,TP:3,ALB:3,GLOB:3,ggt:3,aml:3,amyp:3,lpse:3,hlpse:3)No results for input(s): PH, PCO2, PO2 in the last 72 hours. No results found for: CHOL, CHOLX, CHLST, CHOLV, HDL, LDL, LDLC, DLDLP, TGLX, TRIGL, TRIGP, CHHD, CHHDXMEDTABLEGeorge River [facility] III, DO  No results for input(s): PH, PCO2, PO2 in the last 72 hours.     Medications Personally Reviewed:    Current Facility-Administered Medications   Medication Dose Route Frequency    alvimopan (ENTEREG) capsule 12 mg  12 mg Oral BID    oxyCODONE IR (ROXICODONE) tablet 5 mg  5 mg Oral Q4H PRN    oxyCODONE IR (ROXICODONE) tablet 10 mg  10 mg Oral Q4H PRN    heparin (porcine) injection 5,000 Units  5,000 Units SubCUTAneous Q12H    dilTIAZem CD (CARDIZEM CD) capsule 180 mg  180 mg Oral DAILY    amiodarone (CORDARONE) tablet 400 mg  400 mg Oral DAILY    metoprolol succinate (TOPROL-XL) XL tablet 50 mg  50 mg Oral DAILY    valsartan (DIOVAN) tablet 80 mg  80 mg Oral DAILY    sodium chloride (NS) flush 5-10 mL  5-10 mL IntraVENous Q8H    sodium chloride (NS) flush 5-10 mL  5-10 mL IntraVENous PRN    acetaminophen (TYLENOL) tablet 650 mg  650 mg Oral Q6H PRN    ondansetron (ZOFRAN) injection 4 mg  4 mg IntraVENous Q6H PRN    famotidine (PEPCID) tablet 20 mg  20 mg Oral BID         Cl Mon III, DO

## 2018-01-20 NOTE — PROGRESS NOTES
1900: Bedside shift change report given to MANUEL Bledsoe (oncoming nurse) by Donnie Hernandez (offgoing nurse). Report included the following information SBAR, Kardex, Intake/Output, MAR and Recent Results. [facility] NURSING NOTE   Admission Date 1/12/2018   Admission Diagnosis Atrial fibrillation with RVR (HCC)  dysphagia, wt loss  COLON MASS    Consults IP CONSULT TO GASTROENTEROLOGY  IP CONSULT TO COLORECTAL SURGERY      Cardiac Monitoring [x] Yes [] No      Purposeful Hourly Rounding [x] Yes    Darius Score Total Score: 1   Darius score 3 or > [x] Bed Alarm [] Avasys [] 1:1 sitter [] Patient refused (Place signed refusal form in chart)   Tr Score Tr Score: 20   Tr score 14 or < [] PMT consult [] Wound Care consult    []  Specialty bed  [] Nutrition consult      Influenza Vaccine Received Flu Vaccine for Current Season (usually Sept-March): Yes    Patient/Guardian Refused (Notify MD): No      Oxygen needs? [x] Room air Oxygen @  []1L    []2L    []3L   []4L    []5L   []6L     Use home O2? [] Yes [x] No  Perform O2 challenge test using  smartphrase (.Homeoxygen)      Last bowel movement Last Bowel Movement Date: 01/19/18      Urinary Catheter [REMOVED] Urinary Catheter 01/18/18 2- way; Rocha-Criteria for Appropriate Use: Surgical procedure     [REMOVED] Urinary Catheter 01/18/18 2- way; Rocha-Urine Output (mL): 200 ml     LDAs               Peripheral IV 01/12/18 Right Antecubital (Active)   Site Assessment Clean, dry, & intact 1/20/2018  7:42 AM   Phlebitis Assessment 0 1/20/2018  7:42 AM   Infiltration Assessment 0 1/20/2018  7:42 AM   Dressing Status Clean, dry, & intact 1/20/2018  7:42 AM   Dressing Type Tape;Transparent 1/20/2018  7:42 AM   Hub Color/Line Status Pink 1/20/2018  7:42 AM   Action Taken Blood drawn 1/12/2018 11:20 AM       Peripheral IV 01/12/18 Left Forearm (Active)   Site Assessment Clean, dry, & intact 1/20/2018  7:42 AM   Phlebitis Assessment 0 1/20/2018  7:42 AM   Infiltration Assessment 0 1/20/2018  7:42 AM   Dressing Status Clean, dry, & intact 1/20/2018  7:42 AM   Dressing Type Tape;Transparent 1/20/2018  7:42 AM   Hub Color/Line Status Pink 1/20/2018  7:42 AM                         Readmission Risk Assessment Tool Score Medium Risk            13       Total Score        3 Has Seen PCP in Last 6 Months (Yes=3, No=0)    2 . Living with Significant Other. Assisted Living. LTAC. SNF. or   Rehab    3 Patient Length of Stay (>5 days = 3)    5 Pt.  Coverage (Medicare=5 , Medicaid, or Self-Pay=4)        Criteria that do not apply:    IP Visits Last 12 Months (1-3=4, 4=9, >4=11)    Charlson Comorbidity Score (Age + Comorbid Conditions)       Expected Length of Stay 6d 7h   Actual Length of Stay 8

## 2018-01-20 NOTE — PROGRESS NOTES
Problem: Falls - Risk of  Goal: *Absence of Falls  Document Darius Fall Risk and appropriate interventions in the flowsheet. Outcome: Progressing Towards Goal  Fall Risk Interventions:  Mobility Interventions: Bed/chair exit alarm         Medication Interventions: Patient to call before getting OOB    Elimination Interventions: Call light in reach    History of Falls Interventions:  Investigate reason for fall

## 2018-01-20 NOTE — PROGRESS NOTES
CRS POD#2 s/p lap left colectomy    Doing well. Pain controlled. No nausea or vomiting. Passing flatus and has had 2 BMs (soft). /75 (BP 1 Location: Right arm, BP Patient Position: At rest)  Pulse 100  Temp 97.4 °F (36.3 °C)  Resp 18  Ht 6' 4\" (1.93 m)  Wt 84.6 kg (186 lb 8.2 oz)  SpO2 94%  BMI 22.7 kg/m2    NAD, AAOx3  Abd soft, approp TTP, nondistended  Incisons c/d/i with mild erythema    Plan  -Advance to GI lite diet  -PO oxycodone prn for pain control  -Continue heparin subq. Resume xarelto upon discharge  -Likely okay to discharge tomorrow vs Monday. I am still unclear as to why he was having such significant weight loss. The mass that was removed did not show evidence of invasive cancer and it was not causing compressive symptoms on his stomach or duodenum.

## 2018-01-21 LAB
ANION GAP SERPL CALC-SCNC: 5 MMOL/L (ref 5–15)
BASOPHILS # BLD: 0 K/UL (ref 0–0.1)
BASOPHILS NFR BLD: 0 % (ref 0–1)
BUN SERPL-MCNC: 26 MG/DL (ref 6–20)
BUN/CREAT SERPL: 35 (ref 12–20)
CALCIUM SERPL-MCNC: 8.4 MG/DL (ref 8.5–10.1)
CHLORIDE SERPL-SCNC: 105 MMOL/L (ref 97–108)
CO2 SERPL-SCNC: 26 MMOL/L (ref 21–32)
CREAT SERPL-MCNC: 0.74 MG/DL (ref 0.7–1.3)
EOSINOPHIL # BLD: 0.2 K/UL (ref 0–0.4)
EOSINOPHIL NFR BLD: 1 % (ref 0–7)
ERYTHROCYTE [DISTWIDTH] IN BLOOD BY AUTOMATED COUNT: 16.1 % (ref 11.5–14.5)
GLUCOSE SERPL-MCNC: 104 MG/DL (ref 65–100)
HCT VFR BLD AUTO: 37.2 % (ref 36.6–50.3)
HGB BLD-MCNC: 12.6 G/DL (ref 12.1–17)
LYMPHOCYTES # BLD: 1.2 K/UL (ref 0.8–3.5)
LYMPHOCYTES NFR BLD: 10 % (ref 12–49)
MAGNESIUM SERPL-MCNC: 1.9 MG/DL (ref 1.6–2.4)
MCH RBC QN AUTO: 28.9 PG (ref 26–34)
MCHC RBC AUTO-ENTMCNC: 33.9 G/DL (ref 30–36.5)
MCV RBC AUTO: 85.3 FL (ref 80–99)
MONOCYTES # BLD: 1.1 K/UL (ref 0–1)
MONOCYTES NFR BLD: 9 % (ref 5–13)
NEUTS SEG # BLD: 10.1 K/UL (ref 1.8–8)
NEUTS SEG NFR BLD: 80 % (ref 32–75)
PLATELET # BLD AUTO: 246 K/UL (ref 150–400)
POTASSIUM SERPL-SCNC: 4.7 MMOL/L (ref 3.5–5.1)
RBC # BLD AUTO: 4.36 M/UL (ref 4.1–5.7)
SODIUM SERPL-SCNC: 136 MMOL/L (ref 136–145)
WBC # BLD AUTO: 12.6 K/UL (ref 4.1–11.1)

## 2018-01-21 PROCEDURE — 74011250636 HC RX REV CODE- 250/636: Performed by: SURGERY

## 2018-01-21 PROCEDURE — 74011250637 HC RX REV CODE- 250/637: Performed by: INTERNAL MEDICINE

## 2018-01-21 PROCEDURE — 74011250637 HC RX REV CODE- 250/637: Performed by: SURGERY

## 2018-01-21 PROCEDURE — 83735 ASSAY OF MAGNESIUM: CPT | Performed by: SURGERY

## 2018-01-21 PROCEDURE — 80048 BASIC METABOLIC PNL TOTAL CA: CPT | Performed by: SURGERY

## 2018-01-21 PROCEDURE — 74011250637 HC RX REV CODE- 250/637: Performed by: HOSPITALIST

## 2018-01-21 PROCEDURE — 36415 COLL VENOUS BLD VENIPUNCTURE: CPT | Performed by: SURGERY

## 2018-01-21 PROCEDURE — 85025 COMPLETE CBC W/AUTO DIFF WBC: CPT | Performed by: SURGERY

## 2018-01-21 PROCEDURE — 65660000000 HC RM CCU STEPDOWN

## 2018-01-21 RX ORDER — AMIODARONE HYDROCHLORIDE 200 MG/1
400 TABLET ORAL EVERY 8 HOURS
Status: DISCONTINUED | OUTPATIENT
Start: 2018-01-21 | End: 2018-01-22

## 2018-01-21 RX ADMIN — OXYCODONE HYDROCHLORIDE 10 MG: 5 TABLET ORAL at 16:05

## 2018-01-21 RX ADMIN — VALSARTAN 80 MG: 40 TABLET, FILM COATED ORAL at 09:51

## 2018-01-21 RX ADMIN — Medication 10 ML: at 20:22

## 2018-01-21 RX ADMIN — Medication 10 ML: at 13:38

## 2018-01-21 RX ADMIN — OXYCODONE HYDROCHLORIDE 10 MG: 5 TABLET ORAL at 20:21

## 2018-01-21 RX ADMIN — DILTIAZEM HYDROCHLORIDE 180 MG: 180 CAPSULE, COATED, EXTENDED RELEASE ORAL at 09:52

## 2018-01-21 RX ADMIN — FAMOTIDINE 20 MG: 20 TABLET, FILM COATED ORAL at 17:32

## 2018-01-21 RX ADMIN — AMIODARONE HYDROCHLORIDE 400 MG: 200 TABLET ORAL at 09:52

## 2018-01-21 RX ADMIN — METOPROLOL SUCCINATE 50 MG: 50 TABLET, EXTENDED RELEASE ORAL at 09:51

## 2018-01-21 RX ADMIN — HEPARIN SODIUM 5000 UNITS: 5000 INJECTION, SOLUTION INTRAVENOUS; SUBCUTANEOUS at 20:24

## 2018-01-21 RX ADMIN — HEPARIN SODIUM 5000 UNITS: 5000 INJECTION, SOLUTION INTRAVENOUS; SUBCUTANEOUS at 09:51

## 2018-01-21 RX ADMIN — AMIODARONE HYDROCHLORIDE 400 MG: 200 TABLET ORAL at 20:21

## 2018-01-21 RX ADMIN — OXYCODONE HYDROCHLORIDE 10 MG: 5 TABLET ORAL at 00:35

## 2018-01-21 RX ADMIN — FAMOTIDINE 20 MG: 20 TABLET, FILM COATED ORAL at 09:51

## 2018-01-21 RX ADMIN — AMIODARONE HYDROCHLORIDE 400 MG: 200 TABLET ORAL at 13:37

## 2018-01-21 NOTE — PROGRESS NOTES
Bedside shift change report given to MANUEL Bledsoe (oncoming nurse) by Sherran Duane (offgoing nurse). Report included the following information SBAR, Kardex, Intake/Output, MAR and Recent Results. [address] NURSING NOTE   Admission Date 1/12/2018   Admission Diagnosis Atrial fibrillation with RVR (HCC)  dysphagia, wt loss  COLON MASS    Consults IP CONSULT TO GASTROENTEROLOGY  IP CONSULT TO COLORECTAL SURGERY      Cardiac Monitoring [x] Yes [] No      Purposeful Hourly Rounding [x] Yes    Darius Score Total Score: 2   Darius score 3 or > [x] Bed Alarm [] Avasys [] 1:1 sitter [] Patient refused (Place signed refusal form in chart)   Tr Score Tr Score: 20   Tr score 14 or < [] PMT consult [] Wound Care consult    []  Specialty bed  [] Nutrition consult      Influenza Vaccine Received Flu Vaccine for Current Season (usually Sept-March): Yes    Patient/Guardian Refused (Notify MD): No      Oxygen needs? [x] Room air Oxygen @  []1L    []2L    []3L   []4L    []5L   []6L     Use home O2? [] Yes [x] No  Perform O2 challenge test using  smartphrase (.Homeoxygen)      Last bowel movement Last Bowel Movement Date: 01/20/18      Urinary Catheter [REMOVED] Urinary Catheter 01/18/18 2- way; Rocha-Criteria for Appropriate Use: Surgical procedure     [REMOVED] Urinary Catheter 01/18/18 2- way; Rocha-Urine Output (mL): 200 ml     LDAs               Peripheral IV 01/12/18 Left Forearm (Active)   Site Assessment Clean, dry, & intact 1/21/2018  7:50 AM   Phlebitis Assessment 0 1/21/2018  7:50 AM   Infiltration Assessment 0 1/21/2018  7:50 AM   Dressing Status Clean, dry, & intact 1/21/2018  7:50 AM   Dressing Type Tape;Transparent 1/21/2018  7:50 AM   Hub Color/Line Status Pink 1/21/2018  7:50 AM                         Readmission Risk Assessment Tool Score Medium Risk            13       Total Score        3 Has Seen PCP in Last 6 Months (Yes=3, No=0)    2 . Living with Significant Other. Assisted Living. LTAC. SNF.  or   Rehab 3 Patient Length of Stay (>5 days = 3)    5 Pt.  Coverage (Medicare=5 , Medicaid, or Self-Pay=4)        Criteria that do not apply:    IP Visits Last 12 Months (1-3=4, 4=9, >4=11)    Charlson Comorbidity Score (Age + Comorbid Conditions)       Expected Length of Stay 6d 7h   Actual Length of Stay 9

## 2018-01-21 NOTE — PROGRESS NOTES
CRS POD#3 s/p lap left colectomy    Doing well. Pain controlled. No nausea or vomiting. Passing flatus and having loose BM. /79 (BP 1 Location: Right arm, BP Patient Position: At rest)  Pulse 86  Temp 99.7 °F (37.6 °C)  Resp 17  Ht 6' 4\" (1.93 m)  Wt 87.7 kg (193 lb 6.4 oz)  SpO2 97%  BMI 23.54 kg/m2    NAD, AAOx3  Abd soft, approp TTP, nondistended  Incisons c/d/i with mild erythema    Plan  -Continue GI lite diet  -PO oxycodone prn for pain control  -Continue heparin subq. Resume xarelto upon discharge  -WBC up slightly today.   Continue to monitor

## 2018-01-21 NOTE — PROGRESS NOTES
Progress Note      1/21/2018 7:18 PM  NAME: Eliu Arias   MRN:  [id number]   Admit Diagnosis: Atrial fibrillation with RVR ([facility] [address] [facility]  dysphagia, wt loss  COLON MASS      Assessment:     1. Weight loss, difficulty swallowing, CT of abd/pelvis unrevealing, after bowel prep found to be in Afib with RVR  2. Stress 2014 with no ischemia  3. Hx of cardiomyopathy with EF 30% s/p BiV ICD, last EF normalized  4. Hx of PAF s/p cardioversion had been in sinus on xarelto and amiodarone  5. HTN  6. CKD  7. S/p bilteral Knee replacement, hx of prosthesis infection    Severe weight loss, >40lbs, difficulty swallowing, nausea, undergoing GI evaluation. Here to have EGD and Colonoscopy. Found to be in afib with RVR  with dyspnea. Procedures canceled and pt admitted for further management. 6. 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 Hared to tell . In any case the rate is controlled. OK to proceed with studies tomorrow. 1/15 Endoscopy shows ulceration of the distal esophagus. Colonoscopy showed a mass in the descending Colon which is c/w colon CA. Was biopsied. For colorectal consult. Back in A fib. Rate is controlled. Continue on lovenox and transition back to Xarelto when OK     1/16 HR is still up a little. Add Cardizem for rate control      1/17 Seen in AM on rounds . palns for surgery noted . Rate controlled. Still in A fib . Can use IV Cardizem if needed for rate control robles op. 1/19 s/p partial colectomy. Remains in A Fib with controlled rate. He is having abdominal pain and has not been oob much. On liquid diet        1/20:  GARCIA is new; post-op vs AFib contribution. Remains in AFib; intermittent BiV pacing; rate controlled. On Sq heparin; resume xarelto at surgeons discretion. Cont dilt/metop/amio. UOOB.   Can consider KIM/DCCV is remains symptomatic.    1/21:  Remains in AFib. Still w/ GARCIA. WBC up slightly. Inc amio to 400mg TID. NPO p MN should Dr. Roger Macias want to consider DCCV. UOOB. Will need xarelto if this is the case. Continue dilt and metop. On SQ heparin. Plan:     Continue on meds. [x]        High complexity decision making was performed    Subjective:     Franklyn Lemos denies chest pain. ABD pain better. GARCIA is new. Discussed with RN events overnight. 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 Most recent are:    Visit Vitals    /82 (BP 1 Location: Right arm, BP Patient Position: At rest)    Pulse 88    Temp 98.1 °F (36.7 °C)    Resp 17    Ht 6' 4\" (1.93 m)    Wt 87.7 kg (193 lb 6.4 oz)    SpO2 97%    BMI 23.54 kg/m2       Intake/Output Summary (Last 24 hours) at 01/21/18 1132  Last data filed at 01/21/18 0750   Gross per 24 hour   Intake              480 ml   Output                0 ml   Net              480 ml        General Appearance: Well developed, well nourished, alert & oriented x 3,    no acute distress. Ears/Nose/Mouth/Throat: Hearing grossly normal.  Neck: Supple. Chest: Lungs clear to auscultation bilaterally.   Cardiovascular: Irregular rate and rhythm, S1S2 normal, no murmur. Abdomen: Soft, non-tender, bowel sounds are active. Extremities: No edema bilaterally. Skin: Warm and dry. PMH/SH reviewed - no change compared to H&P    Data Review    Telemetry: AFib w/ intermittent BiV pacing    Lab Data Personally Reviewed:    Recent Labs      01/21/18 0228 01/20/18 0418   WBC  12.6*  10.3   HGB  12.6  12.4   HCT  37.2  37.2   PLT  246  203   LABRCNT(INR:3,PTP:3,APTT:3,)  Recent Labs      01/21/18 0228 01/20/18 0418 01/19/18   0246   NA  136  136  139   K  4.7  4.7  4.4   CL  105  106  109*   CO2  26  22  22   BUN  26*  27*  25*   CREA  0.74  0.71  0.95   GLU  104*  108*  164*   CA  8.4*  8.3*  8.5   MG  1.9  1.9  1.8   LABRCNT(CPK:3,CpKMB:3,ckndx:3,troiq:3)No results found for: CHOL, CHOLX, CHLST, CHOLV, HDL, LDL, LDLC, DLDLP, TGLX, TRIGL, TRIGP, CHHD, CHHDXLABRCNT(sgot:3,gpt:3,ap:3,tbiL:3,TP:3,ALB:3,GLOB:3,ggt:3,aml:3,amyp:3,lpse:3,hlpse:3)No results for input(s): PH, PCO2, PO2 in the last 72 hours. No results found for: CHOL, CHOLX, CHLST, CHOLV, HDL, LDL, LDLC, DLDLP, TGLX, TRIGL, TRIGP, CHHD, CHHDXMEDTABLEGeorge H Young III, DO  No results for input(s): PH, PCO2, PO2 in the last 72 hours.     Medications Personally Reviewed:    Current Facility-Administered Medications   Medication Dose Route Frequency    amiodarone (CORDARONE) tablet 400 mg  400 mg Oral Q8H    oxyCODONE IR (ROXICODONE) tablet 5 mg  5 mg Oral Q4H PRN    oxyCODONE IR (ROXICODONE) tablet 10 mg  10 mg Oral Q4H PRN    heparin (porcine) injection 5,000 Units  5,000 Units SubCUTAneous Q12H    dilTIAZem CD (CARDIZEM CD) capsule 180 mg  180 mg Oral DAILY    metoprolol succinate (TOPROL-XL) XL tablet 50 mg  50 mg Oral DAILY    valsartan (DIOVAN) tablet 80 mg  80 mg Oral DAILY    sodium chloride (NS) flush 5-10 mL  5-10 mL IntraVENous Q8H    sodium chloride (NS) flush 5-10 mL  5-10 mL IntraVENous PRN    acetaminophen (TYLENOL) tablet 650 mg  650 mg Oral Q6H PRN    ondansetron (ZOFRAN) injection 4 mg  4 mg IntraVENous Q6H PRN    famotidine (PEPCID) tablet 20 mg  20 mg Oral BID         Abeba Real III, DO

## 2018-01-21 NOTE — PROGRESS NOTES
Hospitalist Progress Note    NAME: Jesus Manuel Bazan   :  1945   MRN:  [id number]       Assessment / Plan:  Atrial fib with RVR, HTN and CKD. Cardiology following, considering KIM and cardioversion  Hx of cardiomyopathy with EF 30% s/p BiV ICD, last EF normalized  Hx of PAF s/p cardioversion had been in sinus on xarelto and amiodarone  Holding xarelto until discharge per surgery  Started heparin subq, D/w Dr. Flo Case    Weight loss. Intermittent vomiting  Colon mass, gastritis, duodenitis  GI/surgery  Inputs and recommendations appreciated. S/p Egd/colonoscopy, EGD with esophageal ulcers on peocid, colonoscopy with suspected colon cancer, tubular and villous adnoma  Path reports reviewed. S/p Cystoscopy with insertion of left ureteral stent and laparoscopic lt colectomy  Advance diet as tolerated   Path Villous adenoma, 3.0 cm, with focal high-grade dysplasia; negative for invasive carcinoma   Regional lymph nodes: Twenty-seven (27) reactive lymph nodes, negative for carcinoma   Tubular adenoma (0.8 cm), Biopsy sites, at least one associated with submucosal abscess     CT chest. Needs OP follow up. Centrilobular emphysema, 4.6 cm fusiform ascending aortic aneurysm,  interstitial septal thickening which may reflect mild edema or chronic  interstitial disease.      Acutely elevated LFTs  Hx hepatic steatosis  No abdominal pain  Follow up LFT , slowly trending down. Unremarkable CT through the abdomen and pelvis on 2017. GI on board .     Aspiration with thin liquids on MBS  Bouse thick liquids once resume diet, advancing slowly  S/p  EGD ,Small proximal antral nodule--submucosal  Distal esophageal ulcers,Successful empiric dilation of the ge junction. Pepcid    Generalized weakness  Check TSH, lytes wnl, ? 2/2 deconditioning     Body mass index is 23.21 kg/(m^2). Body mass index is 23.54 kg/(m^2).     Code status: Full  Prophylaxis: heparin  Recommended Disposition: Home w/Family Subjective:     Chief Complaint / Reason for Physician Visit  Feeling weak, Discussed with RN events overnight. Review of Systems:  Symptom Y/N Comments  Symptom Y/N Comments   Fever/Chills n   Chest Pain n    Poor Appetite n   Edema n    Cough n   Abdominal Pain n    Sputum n   Joint Pain n    SOB/GARCIA n   Pruritis/Rash n    Nausea/vomit n   Tolerating PT/OT     Diarrhea n   Tolerating Diet     Constipation n   Other       Could NOT obtain due to:      Objective:     VITALS:   Last 24hrs VS reviewed since prior progress note. Most recent are:  Patient Vitals for the past 24 hrs:   Temp Pulse Resp BP SpO2   01/21/18 1124 98.1 °F (36.7 °C) 88 17 119/82 97 %   01/21/18 0750 99.7 °F (37.6 °C) 86 17 120/79 97 %   01/21/18 0235 98.6 °F (37 °C) 99 18 138/86 95 %   01/20/18 2231 98.7 °F (37.1 °C) 95 18 125/82 98 %   01/20/18 1941 98.6 °F (37 °C) 85 18 116/62 96 %   01/20/18 1635 97.7 °F (36.5 °C) 98 18 119/69 98 %       Intake/Output Summary (Last 24 hours) at 01/21/18 1248  Last data filed at 01/21/18 0750   Gross per 24 hour   Intake              480 ml   Output                0 ml   Net              480 ml        PHYSICAL EXAM:  General: WD, WN. Alert, cooperative, no acute distress    EENT:  EOMI. Anicteric sclerae. MMM  Resp:  CTA bilaterally, no wheezing or rales. No accessory muscle use  CV:  Regular  rhythm,  No edema  GI:  Soft, Non distended, Non tender.  +Bowel sounds lap incisions noted  Neurologic:  Alert and oriented X 3, normal speech, grossly intact  Psych:   Good insight. Not anxious nor agitated  Skin:  No rashes.   No jaundice    Reviewed most current lab test results and cultures  YES  Reviewed most current radiology test results   YES  Review and summation of old records today    NO  Reviewed patient's current orders and MAR    YES  PMH/SH reviewed - no change compared to H&P  ________________________________________________________________________  Care Plan discussed with:    Comments Patient y    Family  y    RN y    Care Manager y    Consultant                        Multidiciplinary team rounds were held today with , nursing, pharmacist and clinical coordinator. Patient's plan of care was discussed; medications were reviewed and discharge planning was addressed. ________________________________________________________________________  Total NON critical care TIME:  30   Minutes    Total CRITICAL CARE TIME Spent:   Minutes non procedure based      Comments   >50% of visit spent in counseling and coordination of care     ________________________________________________________________________  Addie Lundberg MD     Procedures: see electronic medical records for all procedures/Xrays and details which were not copied into this note but were reviewed prior to creation of Plan. LABS:  I reviewed today's most current labs and imaging studies.   Pertinent labs include:  Recent Labs      01/21/18 0228 01/20/18 0418  01/19/18   0246   WBC  12.6*  10.3  13.0*   HGB  12.6  12.4  12.7   HCT  37.2  37.2  39.2   PLT  246  203  238     Recent Labs      01/21/18 0228 01/20/18 0418  01/19/18   0246   NA  136  136  139   K  4.7  4.7  4.4   CL  105  106  109*   CO2  26  22  22   GLU  104*  108*  164*   BUN  26*  27*  25*   CREA  0.74  0.71  0.95   CA  8.4*  8.3*  8.5   MG  1.9  1.9  1.8   PHOS   --   2.7   --        Signed: Addie Lundberg MD

## 2018-01-22 ENCOUNTER — TELEPHONE (OUTPATIENT)
Dept: ENDOCRINOLOGY | Age: 73
End: 2018-01-22

## 2018-01-22 LAB
ANION GAP SERPL CALC-SCNC: 7 MMOL/L (ref 5–15)
BASOPHILS # BLD: 0 K/UL (ref 0–0.1)
BASOPHILS NFR BLD: 0 % (ref 0–1)
BUN SERPL-MCNC: 21 MG/DL (ref 6–20)
BUN/CREAT SERPL: 32 (ref 12–20)
CALCIUM SERPL-MCNC: 8.3 MG/DL (ref 8.5–10.1)
CHLORIDE SERPL-SCNC: 104 MMOL/L (ref 97–108)
CO2 SERPL-SCNC: 26 MMOL/L (ref 21–32)
CREAT SERPL-MCNC: 0.66 MG/DL (ref 0.7–1.3)
EOSINOPHIL # BLD: 0.4 K/UL (ref 0–0.4)
EOSINOPHIL NFR BLD: 5 % (ref 0–7)
ERYTHROCYTE [DISTWIDTH] IN BLOOD BY AUTOMATED COUNT: 15.7 % (ref 11.5–14.5)
GLUCOSE SERPL-MCNC: 88 MG/DL (ref 65–100)
HCT VFR BLD AUTO: 36.6 % (ref 36.6–50.3)
HGB BLD-MCNC: 12.3 G/DL (ref 12.1–17)
LYMPHOCYTES # BLD: 1 K/UL (ref 0.8–3.5)
LYMPHOCYTES NFR BLD: 11 % (ref 12–49)
MAGNESIUM SERPL-MCNC: 1.8 MG/DL (ref 1.6–2.4)
MCH RBC QN AUTO: 28.8 PG (ref 26–34)
MCHC RBC AUTO-ENTMCNC: 33.6 G/DL (ref 30–36.5)
MCV RBC AUTO: 85.7 FL (ref 80–99)
MONOCYTES # BLD: 0.8 K/UL (ref 0–1)
MONOCYTES NFR BLD: 9 % (ref 5–13)
NEUTS SEG # BLD: 6.5 K/UL (ref 1.8–8)
NEUTS SEG NFR BLD: 75 % (ref 32–75)
PHOSPHATE SERPL-MCNC: 3.5 MG/DL (ref 2.6–4.7)
PLATELET # BLD AUTO: 217 K/UL (ref 150–400)
POTASSIUM SERPL-SCNC: 4.6 MMOL/L (ref 3.5–5.1)
RBC # BLD AUTO: 4.27 M/UL (ref 4.1–5.7)
SODIUM SERPL-SCNC: 137 MMOL/L (ref 136–145)
TSH SERPL DL<=0.05 MIU/L-ACNC: <0.01 UIU/ML (ref 0.36–3.74)
WBC # BLD AUTO: 8.7 K/UL (ref 4.1–11.1)

## 2018-01-22 PROCEDURE — 97110 THERAPEUTIC EXERCISES: CPT

## 2018-01-22 PROCEDURE — 84443 ASSAY THYROID STIM HORMONE: CPT | Performed by: INTERNAL MEDICINE

## 2018-01-22 PROCEDURE — 74011250636 HC RX REV CODE- 250/636: Performed by: SURGERY

## 2018-01-22 PROCEDURE — 74011250637 HC RX REV CODE- 250/637: Performed by: INTERNAL MEDICINE

## 2018-01-22 PROCEDURE — 65660000000 HC RM CCU STEPDOWN

## 2018-01-22 PROCEDURE — 97116 GAIT TRAINING THERAPY: CPT

## 2018-01-22 PROCEDURE — 84100 ASSAY OF PHOSPHORUS: CPT | Performed by: INTERNAL MEDICINE

## 2018-01-22 PROCEDURE — 36415 COLL VENOUS BLD VENIPUNCTURE: CPT | Performed by: SURGERY

## 2018-01-22 PROCEDURE — 83735 ASSAY OF MAGNESIUM: CPT | Performed by: INTERNAL MEDICINE

## 2018-01-22 PROCEDURE — 85025 COMPLETE CBC W/AUTO DIFF WBC: CPT | Performed by: SURGERY

## 2018-01-22 PROCEDURE — 74011250637 HC RX REV CODE- 250/637: Performed by: SURGERY

## 2018-01-22 PROCEDURE — 80048 BASIC METABOLIC PNL TOTAL CA: CPT | Performed by: INTERNAL MEDICINE

## 2018-01-22 PROCEDURE — 74011250637 HC RX REV CODE- 250/637: Performed by: HOSPITALIST

## 2018-01-22 RX ORDER — OXYCODONE HYDROCHLORIDE 5 MG/1
5 TABLET ORAL
Qty: 30 TAB | Refills: 0 | Status: SHIPPED | OUTPATIENT
Start: 2018-01-22 | End: 2018-02-13 | Stop reason: CLARIF

## 2018-01-22 RX ORDER — AMIODARONE HYDROCHLORIDE 200 MG/1
400 TABLET ORAL DAILY
Status: DISCONTINUED | OUTPATIENT
Start: 2018-01-23 | End: 2018-01-23 | Stop reason: HOSPADM

## 2018-01-22 RX ADMIN — OXYCODONE HYDROCHLORIDE 10 MG: 5 TABLET ORAL at 00:09

## 2018-01-22 RX ADMIN — VALSARTAN 80 MG: 40 TABLET, FILM COATED ORAL at 10:03

## 2018-01-22 RX ADMIN — Medication 10 ML: at 20:27

## 2018-01-22 RX ADMIN — OXYCODONE HYDROCHLORIDE 10 MG: 5 TABLET ORAL at 20:25

## 2018-01-22 RX ADMIN — OXYCODONE HYDROCHLORIDE 10 MG: 5 TABLET ORAL at 11:53

## 2018-01-22 RX ADMIN — METOPROLOL SUCCINATE 50 MG: 50 TABLET, EXTENDED RELEASE ORAL at 10:03

## 2018-01-22 RX ADMIN — AMIODARONE HYDROCHLORIDE 400 MG: 200 TABLET ORAL at 05:25

## 2018-01-22 RX ADMIN — Medication 10 ML: at 13:36

## 2018-01-22 RX ADMIN — HEPARIN SODIUM 5000 UNITS: 5000 INJECTION, SOLUTION INTRAVENOUS; SUBCUTANEOUS at 10:03

## 2018-01-22 RX ADMIN — DILTIAZEM HYDROCHLORIDE 180 MG: 180 CAPSULE, COATED, EXTENDED RELEASE ORAL at 10:02

## 2018-01-22 RX ADMIN — FAMOTIDINE 20 MG: 20 TABLET, FILM COATED ORAL at 20:26

## 2018-01-22 RX ADMIN — FAMOTIDINE 20 MG: 20 TABLET, FILM COATED ORAL at 10:03

## 2018-01-22 RX ADMIN — Medication 10 ML: at 05:18

## 2018-01-22 RX ADMIN — HEPARIN SODIUM 5000 UNITS: 5000 INJECTION, SOLUTION INTRAVENOUS; SUBCUTANEOUS at 20:26

## 2018-01-22 RX ADMIN — OXYCODONE HYDROCHLORIDE 10 MG: 5 TABLET ORAL at 16:01

## 2018-01-22 NOTE — TELEPHONE ENCOUNTER
Tata(Nurse with ED [facility] [phone]) is calling for a hospital consult for Dr. Adenike Kapoor.     Patient: Nick Burns   :   Room: 2290    Reason: Hyperthyroidism    Attending:  Dr. Yanna Mota

## 2018-01-22 NOTE — PROGRESS NOTES
F/U for stone  Dysphagia  Colon mass     S: Mr. Quita Arnett was seen by me today during rounds. At this time, he is resting + comfortably. The patient has no new complaints today. Please see admission consult for details of ROS; there are + changes today. Hb and indigestion is occasional.  He is concerned about hiccoughs. O: Blood pressure 120/85, pulse 96, temperature 97.8 °F (36.6 °C), resp. rate 16, height 6' 4\" (1.93 m), weight 87.7 kg (193 lb 6.4 oz), SpO2 100 %. Gen: Patient is in no acute distress. There is no jaundice. Lungs: Clear to auscultation bilaterally . Heart:irregular . Abd: Soft, non tender, non-distended, bowel sounds present. Extremities: Warm. Cross sectional imaging:  None new  Lab Results   Component Value Date/Time    WBC 8.7 01/22/2018 05:13 AM    Hemoglobin (POC) 12.6 01/12/2015 02:27 PM    HGB 12.3 01/22/2018 05:13 AM    Hematocrit (POC) 37 01/12/2015 02:27 PM    HCT 36.6 01/22/2018 05:13 AM    PLATELET 409 45/41/5646 05:13 AM    MCV 85.7 01/22/2018 05:13 AM     Lab Results   Component Value Date/Time    Sodium 137 01/22/2018 05:13 AM    Potassium 4.6 01/22/2018 05:13 AM    Chloride 104 01/22/2018 05:13 AM    CO2 26 01/22/2018 05:13 AM    Anion gap 7 01/22/2018 05:13 AM    Glucose 88 01/22/2018 05:13 AM    BUN 21 01/22/2018 05:13 AM    Creatinine 0.66 01/22/2018 05:13 AM    BUN/Creatinine ratio 32 01/22/2018 05:13 AM    GFR est AA >60 01/22/2018 05:13 AM    GFR est non-AA >60 01/22/2018 05:13 AM    Calcium 8.3 01/22/2018 05:13 AM    Bilirubin, total 1.1 01/17/2018 03:06 AM    AST (SGOT) 151 01/17/2018 03:06 AM    Alk.  phosphatase 136 01/17/2018 03:06 AM    Protein, total 5.8 01/17/2018 03:06 AM    Albumin 2.3 01/17/2018 03:06 AM    Globulin 3.5 01/17/2018 03:06 AM    A-G Ratio 0.7 01/17/2018 03:06 AM    ALT (SGPT) 176 01/17/2018 03:06 AM        Pathology:     FINAL PATHOLOGIC DIAGNOSIS   Left colon, left hemicolectomy:   Villous adenoma, 3.0 cm, with focal high-grade dysplasia; negative for invasive carcinoma   Regional lymph nodes: Twenty-seven (27) reactive lymph nodes, negative for carcinoma   Tubular adenoma (0.8 cm)   Biopsy sites, at least one associated with submucosal abscess   PATHOLOGIC STAGE CLASSIFICATION (pTNM, AJCC 8th Edition)   Primary Tumor (pT): pTis   Regional Lymph Nodes (pN): N0   Distant Metastasis (pM): MX *Electronically Signed Out By Victor Hugo Hilario M.D.*       A: Principal Problem:    Atrial fibrillation with RVR ([address] (1/12/2018)    Active Problems:    Weight loss (1/12/2018)        Comment:  He is doing better  Colon mass was completely resected and has no invasive cancer.     P:  ppi for stone  See me in office in six weeks  Colonoscopy six months by me  I will sign off    Nic Norris MD  8:45 AM  1/22/2018

## 2018-01-22 NOTE — PROGRESS NOTES
Discussed the recommendation by therapy for home PT. Patient was agreeable and had no preference for a home care agency. Referral placed to [address] [place] [address]. Patient stated his wife will be home to assist and that he already owned a rolling walker.      [phone]

## 2018-01-22 NOTE — PROGRESS NOTES
Endocrinology Progress Note    Aware of consult. Will be by to see patient in the morning for low TSH. Will repeat TSH, free T4, total T3 and TSH receptor antibody tomorrow am.    Please don't hesitate to call [phone] with further questions.     Luis Dean MD

## 2018-01-22 NOTE — PROGRESS NOTES
{[facility] BEDSIDE_VERBAL_RECORDED_WRITTEN:[id number]::\"Bedside\"} shift change report given to *** (oncoming nurse) by *** (offgoing nurse). Report included the following information {[facility] REPORTS RPQM:[id number]}. SHIFT SUMMARY:            [address] NURSING NOTE   Admission Date 1/12/2018   Admission Diagnosis Atrial fibrillation with RVR (HCC)  dysphagia, wt loss  COLON MASS    Consults IP CONSULT TO GASTROENTEROLOGY  IP CONSULT TO COLORECTAL SURGERY      Cardiac Monitoring [] Yes [] No      Purposeful Hourly Rounding [] Yes    Darius Score Total Score: 2   Darius score 3 or > [] Bed Alarm [] [employer] [] 1:1 sitter [] Patient refused (Place signed refusal form in chart)   Tr Score Tr Score: 20   Tr score 14 or < [] PMT consult [] Wound Care consult    []  Specialty bed  [] Nutrition consult      Influenza Vaccine Received Flu Vaccine for Current Season (usually Sept-March): Yes    Patient/Guardian Refused (Notify MD): No      Oxygen needs? [] Room air Oxygen @  []1L    []2L    []3L   []4L    []5L   []6L     Use home O2? [] Yes [] No  Perform O2 challenge test using  smartphrase (.Homeoxygen)      Last bowel movement Last Bowel Movement Date: 01/21/18      Urinary Catheter [REMOVED] Urinary Catheter 01/18/18 2- way; Rocha-Criteria for Appropriate Use: Surgical procedure     [REMOVED] Urinary Catheter 01/18/18 2- way; Rocha-Urine Output (mL): 200 ml     LDAs               Peripheral IV 01/12/18 Left Forearm (Active)   Site Assessment Clean, dry, & intact 1/22/2018  3:53 AM   Phlebitis Assessment 0 1/22/2018  3:53 AM   Infiltration Assessment 0 1/22/2018  3:53 AM   Dressing Status Clean, dry, & intact 1/22/2018  3:53 AM   Dressing Type Transparent 1/22/2018  3:53 AM   Hub Color/Line Status Pink 1/22/2018  3:53 AM                         Readmission Risk Assessment Tool Score Medium Risk            13       Total Score        3 Has Seen PCP in Last 6 Months (Yes=3, No=0)    2 . Living with Significant Other.  Assisted Living. LTAC. SNF. or   Rehab    3 Patient Length of Stay (>5 days = 3)    5 Pt.  Coverage (Medicare=5 , Medicaid, or Self-Pay=4)        Criteria that do not apply:    IP Visits Last 12 Months (1-3=4, 4=9, >4=11)    Charlson Comorbidity Score (Age + Comorbid Conditions)       Expected Length of Stay 6d 7h   Actual Length of Stay 10

## 2018-01-22 NOTE — PROGRESS NOTES
Problem: Mobility Impaired (Adult and Pediatric)  Goal: *Acute Goals and Plan of Care (Insert Text)  Physical Therapy Goals  Initiated 1/19/2018  1. Patient will move from supine to sit and sit to supine , scoot up and down and roll side to side in bed with modified independence within 7 day(s). 2.  Patient will transfer from bed to chair and chair to bed with independence using the least restrictive device within 7 day(s). 3.  Patient will perform sit to stand with independence within 7 day(s). 4.  Patient will ambulate with modified independence for 100 feet with the least restrictive device within 7 day(s). 5.  Patient will ascend/descend 3 stairs with handrail(s) with supervision/set-up within 7 day(s). physical Therapy TREATMENT  Patient: Samantha Mcnally (45 y.o. male)  Date: 1/22/2018  Diagnosis: Atrial fibrillation with RVR ([address] dysphagia, wt loss, COLON MASS      Procedure(s) (LRB):  LAPAROSCOPIC HAND ASSISTED LEFT COLECTOMY, CYSTO WITH INSERTION LEFT URETERAL STENT, AND REMOVAL LEFT URETERAL STENT (Left)  CYSTOSCOPY INSERTION LEFT URETERAL STENT (Left) 4 Days Post-Op     Precautions: Fall  Chart, physical therapy assessment, plan of care and goals were reviewed. ASSESSMENT: pt tolerated tx very well, no LOB or SOB, good motivation, does well with bed mob and ther-ex, vc's for safety and proper RW use. Progression toward goals:  [x]      Improving appropriately and progressing toward goals  []      Improving slowly and progressing toward goals  []      Not making progress toward goals and plan of care will be adjusted     PLAN:  Patient continues to benefit from skilled intervention to address the above impairments. Continue treatment per established plan of care.   Discharge Recommendations:  Home Health  Further Equipment Recommendations for Discharge:  rolling walker     OBJECTIVE DATA SUMMARY:     Critical Behavior:  Neurologic State: Alert  Orientation Level: Oriented X4  Cognition: Follows commands     Functional Mobility Training:  Bed Mobility:  Rolling: Supervision  Supine to Sit: Supervision  Scooting: Supervision  Level of Assistance: Supervision   Interventions: Verbal cues     Transfers:  Sit to Stand: Stand-by asssistance  Stand to Sit: Stand-by asssistance  Bed to Chair: Contact guard assistance  Interventions: Verbal cues  Level of Assistance: Contact guard assistance     Balance:  Sitting: Intact; Without support  Standing: Intact; With support  Standing - Static: Good;Constant support  Standing - Dynamic : Good     Ambulation/Gait Training:  Distance (ft): 180 Feet (ft)  Assistive Device: Gait belt;Walker, rolling  Ambulation - Level of Assistance: Contact guard assistance;Stand-by asssistance  Gait Abnormalities: Decreased step clearance  Right Side Weight Bearing: Full  Left Side Weight Bearing: Full  Base of Support: Widened  Speed/Eliz: Pace decreased (<100 feet/min)  Step Length: Left shortened;Right shortened    Therapeutic Exercises:   sitting  EXERCISE   Sets   Reps   Active Active Assist   Passive   Comments   Ankle pumps 1 10 [x] [] [] bilat   Heel raises 1 10 [x] [] [] \"   Toe tap 1 10 [x] [] [] \"   Knee ext 1 10 [x] [] [] \"   Hip flex 1 10 [x] [] [] \"     Pain:  Pain Scale 1: Numeric (0 - 10)  Pain Intensity 1: 0  Pain Location 1: Abdomen  Pain Orientation 1: Mid  Pain Description 1: Aching  Pain Intervention(s) 1: Medication (see MAR)     Activity Tolerance: good    After treatment:   [x] Patient left in no apparent distress sitting up in chair  [] Patient left in no apparent distress in bed  [x] Call bell left within reach  [x] Nursing notified  [] Caregiver present  [] Bed alarm activated    COMMUNICATION/COLLABORATION:   The patients plan of care was discussed with: Registered Nurse    Clifton Talbert PTA   Time Calculation: 25 mins

## 2018-01-22 NOTE — PROGRESS NOTES
CRS POD#4 s/p lap left colectomy    Doing well. Pain controlled. No nausea or vomiting. Passing flatus and having loose BM.     /65 (BP 1 Location: Right arm, BP Patient Position: At rest)  Pulse 100  Temp 98.5 °F (36.9 °C)  Resp 18  Ht 6' 4\" (1.93 m)  Wt 87.7 kg (193 lb 6.4 oz)  SpO2 95%  BMI 23.54 kg/m2    NAD, AAOx3  Abd soft, approp TTP, nondistended  Incisons c/d/i with mild erythema    Plan  -Continue GI lite diet  -PO oxycodone prn for pain control.  -Resume xarelto upon discharge

## 2018-01-22 NOTE — PROGRESS NOTES
Hospitalist Progress Note    NAME: Kvng Dunbar   :  1945   MRN:  [id number]       Assessment / Plan:  Atrial fib with RVR, HTN and CKD. Now rate controlled  Cardiology following, plan to cont amiodarone  Hx of cardiomyopathy with EF 30% s/p BiV ICD, last EF normalized  Hx of PAF s/p cardioversion had been in sinus on xarelto and amiodarone  Holding xarelto until discharge per surgery  Started heparin subq  Has elevated TSH, will check TFT's, on BB, will request cardiology consult    Weight loss. Intermittent vomiting  Colon mass, gastritis, duodenitis  GI/surgery  Inputs and recommendations appreciated. S/p Egd/colonoscopy, EGD with esophageal ulcers on peocid, colonoscopy with suspected colon cancer, tubular and villous adnoma  Path reports reviewed. S/p Cystoscopy with insertion of left ureteral stent and laparoscopic lt colectomy  Tolerating diet  Path Villous adenoma, 3.0 cm, with focal high-grade dysplasia; negative for invasive carcinoma   Regional lymph nodes: Twenty-seven (27) reactive lymph nodes, negative for carcinoma   Tubular adenoma (0.8 cm), Biopsy sites, at least one associated with submucosal abscess   D/w Dr. Tierra Barraza, will need to follow-up OP in 4-6 weeks, rec to cont PPI    CT chest. Needs OP follow up. Centrilobular emphysema, 4.6 cm fusiform ascending aortic aneurysm,  interstitial septal thickening which may reflect mild edema or chronic  interstitial disease.      Acutely elevated LFTs  Hx hepatic steatosis  No abdominal pain  Follow up LFT , slowly trending down. Unremarkable CT through the abdomen and pelvis on 2017. GI following     Aspiration with thin liquids on MBS  [place] thick liquids once resume diet, advancing slowly  S/p  EGD ,Small proximal antral nodule--submucosal  Distal esophageal ulcers,Successful empiric dilation of the ge junction.    Pepcid    Generalized weakness  Likely secondary to deconditioning  PT/OT    Body mass index is 23.21 kg/(m^2). Body mass index is 23.54 kg/(m^2). Code status: Full  Prophylaxis: heparin  Recommended Disposition: Home w/Family     Subjective:     Chief Complaint / Reason for Physician Visit  gen weakness Discussed with RN events overnight. Review of Systems:  Symptom Y/N Comments  Symptom Y/N Comments   Fever/Chills n   Chest Pain n    Poor Appetite n   Edema n    Cough n   Abdominal Pain n    Sputum n   Joint Pain n    SOB/GARCIA n   Pruritis/Rash n    Nausea/vomit n   Tolerating PT/OT     Diarrhea n   Tolerating Diet     Constipation n   Other       Could NOT obtain due to:      Objective:     VITALS:   Last 24hrs VS reviewed since prior progress note. Most recent are:  Patient Vitals for the past 24 hrs:   Temp Pulse Resp BP SpO2   01/22/18 1057 97.5 °F (36.4 °C) 97 18 112/76 100 %   01/22/18 0734 97.8 °F (36.6 °C) 96 16 120/85 100 %   01/22/18 0512 97.9 °F (36.6 °C) 99 16 123/82 95 %   01/21/18 2315 98 °F (36.7 °C) 94 16 118/70 95 %   01/21/18 2028 97.8 °F (36.6 °C) 97 18 110/74 98 %   01/21/18 1531 97.6 °F (36.4 °C) (!) 107 18 111/78 96 %     No intake or output data in the 24 hours ending 01/22/18 1432     PHYSICAL EXAM:  General: WD, WN. Alert, cooperative, no acute distress    EENT:  EOMI. Anicteric sclerae. MMM  Resp:  CTA bilaterally, no wheezing or rales. No accessory muscle use  CV:  Regular  rhythm,  No edema  GI:  Soft, Non distended, Non tender.  +Bowel sounds lap incisions noted  Neurologic:  Alert and oriented X 3, normal speech, grossly intact  Psych:   Good insight. Not anxious nor agitated  Skin:  No rashes.   No jaundice    Reviewed most current lab test results and cultures  YES  Reviewed most current radiology test results   YES  Review and summation of old records today    NO  Reviewed patient's current orders and MAR    YES  PMH/SH reviewed - no change compared to H&P  ________________________________________________________________________  Care Plan discussed with:    Comments Patient y    Family  y    RN y    Care Manager y    Consultant                        Multidiciplinary team rounds were held today with , nursing, pharmacist and clinical coordinator. Patient's plan of care was discussed; medications were reviewed and discharge planning was addressed. ________________________________________________________________________  Total NON critical care TIME:  30   Minutes    Total CRITICAL CARE TIME Spent:   Minutes non procedure based      Comments   >50% of visit spent in counseling and coordination of care     ________________________________________________________________________  Amari Pettit MD     Procedures: see electronic medical records for all procedures/Xrays and details which were not copied into this note but were reviewed prior to creation of Plan. LABS:  I reviewed today's most current labs and imaging studies.   Pertinent labs include:  Recent Labs      01/22/18   0513  01/21/18 0228 01/20/18 0418   WBC  8.7  12.6*  10.3   HGB  12.3  12.6  12.4   HCT  36.6  37.2  37.2   PLT  217  246  203     Recent Labs      01/22/18   0513  01/21/18 0228  01/20/18 0418   NA  137  136  136   K  4.6  4.7  4.7   CL  104  105  106   CO2  26  26  22   GLU  88  104*  108*   BUN  21*  26*  27*   CREA  0.66*  0.74  0.71   CA  8.3*  8.4*  8.3*   MG  1.8  1.9  1.9   PHOS  3.5   --   2.7       Signed: Amari Pettit MD

## 2018-01-22 NOTE — PROGRESS NOTES
1630  Report received from off going nurse      Weight loss of 40 lbs in 5 months, colonoscopy, EGD, mass found and esophogeal ermias, Jan. 18th lap melody ----derma bond, want PRN q 4 hours, AFIB controlled, up with 1 assist, modified barium swallow 20 L FA  Bedside and Verbal shift change report given to oncoming nurse. Report included the following information SBAR, Kardex and Intake/Output.

## 2018-01-22 NOTE — PROGRESS NOTES
Progress Note      1/22/2018 7:18 PM  NAME: Chelo Rodriguez   MRN:  [id number]   Admit Diagnosis: Atrial fibrillation with RVR ([address]  dysphagia, wt loss  COLON MASS      Assessment:     1. Weight loss, difficulty swallowing, CT of abd/pelvis unrevealing, after bowel prep found to be in Afib with RVR  2. Stress 2014 with no ischemia  3. Hx of cardiomyopathy with EF 30% s/p BiV ICD, last EF normalized  4. Hx of PAF s/p cardioversion had been in sinus on xarelto and amiodarone  5. HTN  6. CKD  7. S/p bilteral Knee replacement, hx of prosthesis infection    Severe weight loss, >40lbs, difficulty swallowing, nausea, undergoing GI evaluation. Here to have EGD and Colonoscopy. Found to be in afib with RVR  with dyspnea. Procedures canceled and pt admitted for further management. 6. 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 Hared to tell . In any case the rate is controlled. OK to proceed with studies tomorrow. 1/15 Endoscopy shows ulceration of the distal esophagus. Colonoscopy showed a mass in the descending Colon which is c/w colon CA. Was biopsied. For colorectal consult. Back in A fib. Rate is controlled. Continue on lovenox and transition back to Xarelto when OK     1/16 HR is still up a little. Add Cardizem for rate control      1/17 Seen in AM on rounds . palns for surgery noted . Rate controlled. Still in A fib . Can use IV Cardizem if needed for rate control robles op. 1/19 s/p partial colectomy. Remains in A Fib with controlled rate. He is having abdominal pain and has not been oob much. On liquid diet        1/20:  GARCIA is new; post-op vs AFib contribution. Remains in AFib; intermittent BiV pacing; rate controlled. On Sq heparin; resume xarelto at surgeons discretion. Cont dilt/metop/amio. UOOB.   Can consider KIM/DCCV is remains symptomatic.    1/21:  Remains in AFib. Still w/ GARCIA. WBC up slightly. Inc amio to 400mg TID. NPO p MN should Dr. Michael Hays want to consider DCCV. UOOB. Will need xarelto if this is the case. Continue dilt and metop. On SQ heparin. 1/22 Remains in A fib with rate controlled. He  Has plenty of Amiodarone on board and I will resume daily dosing. I do not plan to do a cardioversion in this setting. Would plan to wait until he is over the stress of his current issues /  Surgery , etc.  Would have a good chance of going back into A fib so no reason to CV at this time. He has been in and out of SR on his own here. Does need to be on oral anticoagulation when OK with surgerery. Plan:     Continue on meds. See orders     Resume diet. [x]        High complexity decision making was performed    Subjective:     Tj Gil denies chest pain. ABD pain better. GARCIA is new. Discussed with RN events overnight. 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  Most recent are:    Visit Vitals    /85 (BP 1 Location: Right arm, BP Patient Position: At rest)    Pulse 96    Temp 97.8 °F (36.6 °C)    Resp 16    Ht 6' 4\" (1.93 m)    Wt 87.7 kg (193 lb 6.4 oz)    SpO2 100%    BMI 23.54 kg/m2       Intake/Output Summary (Last 24 hours) at 01/22/18 0825  Last data filed at 01/21/18 1413   Gross per 24 hour   Intake              440 ml   Output                0 ml   Net              440 ml        General Appearance: Well developed, well nourished, alert & oriented x 3,    no acute distress. Ears/Nose/Mouth/Throat: Hearing grossly normal.  Neck: Supple. Chest: Lungs clear to auscultation bilaterally. Cardiovascular: Irregular rate and rhythm, S1S2 normal, no murmur. Abdomen: Soft, non-tender, bowel sounds are active. Extremities: No edema bilaterally. Skin: Warm and dry. PMH/SH reviewed - no change compared to H&P    Data Review    Telemetry: AFib w/ intermittent BiV pacing    Lab Data Personally Reviewed:    Recent Labs      01/22/18 0513 01/21/18 0228   WBC  8.7  12.6*   HGB  12.3  12.6   HCT  36.6  37.2   PLT  217  246   LABRCNT(INR:3,PTP:3,APTT:3,)  Recent Labs      01/22/18   0513  01/21/18 0228 01/20/18   0418   NA  137  136  136   K  4.6  4.7  4.7   CL  104  105  106   CO2  26  26  22   BUN  21*  26*  27*   CREA  0.66*  0.74  0.71   GLU  88  104*  108*   CA  8.3*  8.4*  8.3*   MG  1.8  1.9  1.9   LABRCNT(CPK:3,CpKMB:3,ckndx:3,troiq:3)No results found for: CHOL, CHOLX, CHLST, CHOLV, HDL, LDL, LDLC, DLDLP, TGLX, TRIGL, TRIGP, CHHD, CHHDXLABRCNT(sgot:3,gpt:3,ap:3,tbiL:3,TP:3,ALB:3,GLOB:3,ggt:3,aml:3,amyp:3,lpse:3,hlpse:3)No results for input(s): PH, PCO2, PO2 in the last 72 hours. No results found for: CHOL, CHOLX, CHLST, CHOLV, HDL, LDL, LDLC, DLDLP, TGLX, TRIGL, TRIGP, CHHD, CHHDXMEDTABLETimmali Underwood MD  No results for input(s): PH, PCO2, PO2 in the last 72 hours.     Medications Personally Reviewed:    Current Facility-Administered Medications   Medication Dose Route Frequency    [START ON 1/23/2018] amiodarone (CORDARONE) tablet 400 mg  400 mg Oral DAILY    oxyCODONE IR (ROXICODONE) tablet 5 mg  5 mg Oral Q4H PRN    oxyCODONE IR (ROXICODONE) tablet 10 mg  10 mg Oral Q4H PRN    heparin (porcine) injection 5,000 Units  5,000 Units SubCUTAneous Q12H    dilTIAZem CD (CARDIZEM CD) capsule 180 mg  180 mg Oral DAILY    metoprolol succinate (TOPROL-XL) XL tablet 50 mg  50 mg Oral DAILY    valsartan (DIOVAN) tablet 80 mg  80 mg Oral DAILY    sodium chloride (NS) flush 5-10 mL  5-10 mL IntraVENous Q8H    sodium chloride (NS) flush 5-10 mL  5-10 mL IntraVENous PRN    acetaminophen (TYLENOL) tablet 650 mg  650 mg Oral Q6H PRN    ondansetron (ZOFRAN) injection 4 mg  4 mg IntraVENous Q6H PRN    famotidine (PEPCID) tablet 20 mg  20 mg Oral BID         Dagmar López MD

## 2018-01-23 ENCOUNTER — TELEPHONE (OUTPATIENT)
Dept: ENDOCRINOLOGY | Age: 73
End: 2018-01-23

## 2018-01-23 ENCOUNTER — HOME HEALTH ADMISSION (OUTPATIENT)
Dept: HOME HEALTH SERVICES | Facility: HOME HEALTH | Age: 73
End: 2018-01-23

## 2018-01-23 VITALS
RESPIRATION RATE: 16 BRPM | HEART RATE: 95 BPM | WEIGHT: 193.4 LBS | BODY MASS INDEX: 23.55 KG/M2 | HEIGHT: 76 IN | TEMPERATURE: 98.2 F | SYSTOLIC BLOOD PRESSURE: 118 MMHG | OXYGEN SATURATION: 95 % | DIASTOLIC BLOOD PRESSURE: 85 MMHG

## 2018-01-23 DIAGNOSIS — E05.90 HYPERTHYROIDISM: Primary | ICD-10-CM

## 2018-01-23 LAB
ALBUMIN SERPL-MCNC: 2.1 G/DL (ref 3.5–5)
ALBUMIN/GLOB SERPL: 0.7 {RATIO} (ref 1.1–2.2)
ALP SERPL-CCNC: 167 U/L (ref 45–117)
ALT SERPL-CCNC: 98 U/L (ref 12–78)
ANION GAP SERPL CALC-SCNC: 9 MMOL/L (ref 5–15)
AST SERPL-CCNC: 82 U/L (ref 15–37)
BASOPHILS # BLD: 0 K/UL (ref 0–0.1)
BASOPHILS NFR BLD: 0 % (ref 0–1)
BILIRUB DIRECT SERPL-MCNC: 0.4 MG/DL (ref 0–0.2)
BILIRUB SERPL-MCNC: 0.8 MG/DL (ref 0.2–1)
BUN SERPL-MCNC: 19 MG/DL (ref 6–20)
BUN/CREAT SERPL: 27 (ref 12–20)
CALCIUM SERPL-MCNC: 8.4 MG/DL (ref 8.5–10.1)
CHLORIDE SERPL-SCNC: 104 MMOL/L (ref 97–108)
CO2 SERPL-SCNC: 23 MMOL/L (ref 21–32)
CREAT SERPL-MCNC: 0.7 MG/DL (ref 0.7–1.3)
EOSINOPHIL # BLD: 0.6 K/UL (ref 0–0.4)
EOSINOPHIL NFR BLD: 7 % (ref 0–7)
ERYTHROCYTE [DISTWIDTH] IN BLOOD BY AUTOMATED COUNT: 15.6 % (ref 11.5–14.5)
GLOBULIN SER CALC-MCNC: 3.2 G/DL (ref 2–4)
GLUCOSE SERPL-MCNC: 106 MG/DL (ref 65–100)
HCT VFR BLD AUTO: 35.5 % (ref 36.6–50.3)
HGB BLD-MCNC: 12.1 G/DL (ref 12.1–17)
LYMPHOCYTES # BLD: 0.9 K/UL (ref 0.8–3.5)
LYMPHOCYTES NFR BLD: 12 % (ref 12–49)
MCH RBC QN AUTO: 28.9 PG (ref 26–34)
MCHC RBC AUTO-ENTMCNC: 34.1 G/DL (ref 30–36.5)
MCV RBC AUTO: 84.7 FL (ref 80–99)
MONOCYTES # BLD: 0.7 K/UL (ref 0–1)
MONOCYTES NFR BLD: 10 % (ref 5–13)
NEUTS SEG # BLD: 5.5 K/UL (ref 1.8–8)
NEUTS SEG NFR BLD: 71 % (ref 32–75)
PHOSPHATE SERPL-MCNC: 3.3 MG/DL (ref 2.6–4.7)
PLATELET # BLD AUTO: 219 K/UL (ref 150–400)
POTASSIUM SERPL-SCNC: 4.4 MMOL/L (ref 3.5–5.1)
PROT SERPL-MCNC: 5.3 G/DL (ref 6.4–8.2)
RBC # BLD AUTO: 4.19 M/UL (ref 4.1–5.7)
SODIUM SERPL-SCNC: 136 MMOL/L (ref 136–145)
T4 FREE SERPL-MCNC: >8 NG/DL (ref 0.8–1.5)
TSH SERPL DL<=0.05 MIU/L-ACNC: <0.01 UIU/ML (ref 0.36–3.74)
WBC # BLD AUTO: 7.7 K/UL (ref 4.1–11.1)

## 2018-01-23 PROCEDURE — 84100 ASSAY OF PHOSPHORUS: CPT | Performed by: INTERNAL MEDICINE

## 2018-01-23 PROCEDURE — 74011250637 HC RX REV CODE- 250/637: Performed by: INTERNAL MEDICINE

## 2018-01-23 PROCEDURE — 85025 COMPLETE CBC W/AUTO DIFF WBC: CPT | Performed by: SURGERY

## 2018-01-23 PROCEDURE — 84480 ASSAY TRIIODOTHYRONINE (T3): CPT | Performed by: INTERNAL MEDICINE

## 2018-01-23 PROCEDURE — 36415 COLL VENOUS BLD VENIPUNCTURE: CPT | Performed by: INTERNAL MEDICINE

## 2018-01-23 PROCEDURE — 83520 IMMUNOASSAY QUANT NOS NONAB: CPT | Performed by: INTERNAL MEDICINE

## 2018-01-23 PROCEDURE — 80076 HEPATIC FUNCTION PANEL: CPT | Performed by: INTERNAL MEDICINE

## 2018-01-23 PROCEDURE — 84439 ASSAY OF FREE THYROXINE: CPT | Performed by: INTERNAL MEDICINE

## 2018-01-23 PROCEDURE — 74011250637 HC RX REV CODE- 250/637: Performed by: SURGERY

## 2018-01-23 PROCEDURE — 80048 BASIC METABOLIC PNL TOTAL CA: CPT | Performed by: INTERNAL MEDICINE

## 2018-01-23 PROCEDURE — 74011250637 HC RX REV CODE- 250/637: Performed by: HOSPITALIST

## 2018-01-23 PROCEDURE — 84443 ASSAY THYROID STIM HORMONE: CPT | Performed by: INTERNAL MEDICINE

## 2018-01-23 PROCEDURE — 74011250636 HC RX REV CODE- 250/636: Performed by: SURGERY

## 2018-01-23 RX ORDER — DILTIAZEM HYDROCHLORIDE 180 MG/1
180 CAPSULE, COATED, EXTENDED RELEASE ORAL DAILY
Qty: 30 CAP | Refills: 0 | Status: SHIPPED | OUTPATIENT
Start: 2018-01-24 | End: 2018-02-28

## 2018-01-23 RX ORDER — LANSOPRAZOLE 30 MG/1
30 CAPSULE, DELAYED RELEASE ORAL
Qty: 30 CAP | Refills: 0 | Status: SHIPPED | OUTPATIENT
Start: 2018-01-23 | End: 2018-02-22

## 2018-01-23 RX ORDER — VALSARTAN 80 MG/1
80 TABLET ORAL DAILY
Qty: 30 TAB | Refills: 0 | Status: SHIPPED | OUTPATIENT
Start: 2018-01-24

## 2018-01-23 RX ORDER — METHIMAZOLE 5 MG/1
20 TABLET ORAL DAILY
Status: DISCONTINUED | OUTPATIENT
Start: 2018-01-23 | End: 2018-01-23

## 2018-01-23 RX ORDER — ONDANSETRON 4 MG/1
4 TABLET, FILM COATED ORAL
Qty: 20 TAB | Refills: 0 | Status: SHIPPED | OUTPATIENT
Start: 2018-01-23 | End: 2018-02-13 | Stop reason: CLARIF

## 2018-01-23 RX ORDER — METHIMAZOLE 5 MG/1
20 TABLET ORAL 2 TIMES DAILY
Status: DISCONTINUED | OUTPATIENT
Start: 2018-01-23 | End: 2018-01-23 | Stop reason: HOSPADM

## 2018-01-23 RX ORDER — AMIODARONE HYDROCHLORIDE 400 MG/1
400 TABLET ORAL DAILY
Qty: 30 TAB | Refills: 0 | Status: SHIPPED | OUTPATIENT
Start: 2018-01-24 | End: 2018-02-28

## 2018-01-23 RX ORDER — METHIMAZOLE 10 MG/1
20 TABLET ORAL 2 TIMES DAILY
Qty: 120 TAB | Refills: 11 | Status: SHIPPED | OUTPATIENT
Start: 2018-01-23 | End: 2018-02-06 | Stop reason: SDUPTHER

## 2018-01-23 RX ADMIN — OXYCODONE HYDROCHLORIDE 10 MG: 5 TABLET ORAL at 04:07

## 2018-01-23 RX ADMIN — OXYCODONE HYDROCHLORIDE 10 MG: 5 TABLET ORAL at 00:20

## 2018-01-23 RX ADMIN — Medication 10 ML: at 04:07

## 2018-01-23 RX ADMIN — OXYCODONE HYDROCHLORIDE 10 MG: 5 TABLET ORAL at 09:04

## 2018-01-23 RX ADMIN — VALSARTAN 80 MG: 40 TABLET, FILM COATED ORAL at 09:04

## 2018-01-23 RX ADMIN — FAMOTIDINE 20 MG: 20 TABLET, FILM COATED ORAL at 09:03

## 2018-01-23 RX ADMIN — METHIMAZOLE 20 MG: 5 TABLET ORAL at 11:20

## 2018-01-23 RX ADMIN — METOPROLOL SUCCINATE 50 MG: 50 TABLET, EXTENDED RELEASE ORAL at 09:03

## 2018-01-23 RX ADMIN — HEPARIN SODIUM 5000 UNITS: 5000 INJECTION, SOLUTION INTRAVENOUS; SUBCUTANEOUS at 09:02

## 2018-01-23 RX ADMIN — AMIODARONE HYDROCHLORIDE 400 MG: 200 TABLET ORAL at 09:02

## 2018-01-23 RX ADMIN — DILTIAZEM HYDROCHLORIDE 180 MG: 180 CAPSULE, COATED, EXTENDED RELEASE ORAL at 09:02

## 2018-01-23 NOTE — PROGRESS NOTES
Physical therapy note:   Patient received sitting on window bench upon arrival, says he is going home today at 1:30. Patient denies any PT needs and deferred ambulation at this time. Patient has DME at home. Recommend HHPT. Will follow up tomorrow if patient not discharged. Virginia Garcia, PT, DPT

## 2018-01-23 NOTE — PROGRESS NOTES
Spiritual Care Assessment/Progress Notes    Suma Mendez [id number]  [id number]    1945  71 y.o.  male    Patient Telephone Number: [phone] (home)   Amish Affiliation: Episcopalian   Language: English   Extended Emergency Contact Information  Primary Emergency Contact: Mary Anne Hugo  Address: [address]           [facility] [address], [address] [place] [address] [address] Phone: [phone]  Mobile Phone: [phone]  Relation: Spouse   Patient Active Problem List    Diagnosis Date Noted    Hyperthyroidism 01/23/2018    Atrial fibrillation with RVR ([address] 01/12/2018    Weight loss 01/12/2018    Infection and inflammatory reaction due to internal joint prosthesis ([address] 03/23/2015    DJD (degenerative joint disease) of knee 03/23/2015    Broken prosthetic joint implant ([facility] [address] 01/12/2015    Infection of prosthetic knee joint ([facility] 01/12/2015        Date: 1/23/2018       Level of Amish/Spiritual Activity:  [x]         Involved in lisa tradition/spiritual practice    []         Not involved in lisa tradition/spiritual practice  []         Spiritually oriented    []         Claims no spiritual orientation    []         seeking spiritual identity  []         Feels alienated from Episcopal practice/tradition  []         Feels angry about Episcopal practice/tradition  [x]         Spirituality/Episcopal tradition is a resource for coping at this time.   []         Not able to assess due to medical condition    Services Provided Today:  []         crisis intervention    []         reading Scriptures  [x]         spiritual assessment    []         prayer  [x]         empathic listening/emotional support  []         rites and rituals (cite in comments)  []         life review     []         Episcopal support  []         theological development     []         advocacy  []         ethical dialog     []         blessing  []         bereavement support    []         support to family  [] anticipatory grief support   []         help with AMD  []         spiritual guidance    []         meditation      Spiritual Care Needs  []         Emotional Support  []         Spiritual/Gnosticist Care  []         Loss/Adjustment  []         Advocacy/Referral                /Ethics  [x]         No needs expressed at               this time  []         Other: (note in               comments)  [address]  []         Follow up visits with               pt/family  []         Provide materials  []         Schedule sacraments  []         Contact Community               Clergy  [x]         Follow up as needed  []         Other: (note in               comments)     Comments: Initial visit with patient in 2290. Introduced self and explained role of chaplains in the hospital. Provided reflective listening as patient shared events of his hospitalization and processed unexpectedness of this length of stay. Patient also talked about how his health has declined in the past months and his gratitude and hope that there are now answers that will help restore him to his previous level of activity and well-being. Patient shared that his is Mosque and has appreciated the visits by [employer] during his hospital visit. Patient is planning to go home today and is hopeful for continued recovery. Offered blessing for patient's trip home and continued improvement and advised of  availability as needed or desired.  Suzette Schuster M.Div.    Paging Service [phone])

## 2018-01-23 NOTE — TELEPHONE ENCOUNTER
Called patient this afternoon. Scheduled him for a hospital follow-up on 3/8/18 at 2:10 PM.    He had already picked up his Methimazole prescription at the pharmacy.    Told him he needs to have labs drawn 2-3 prior to his March appointment and that the lab order was already in the sytem at [facility].

## 2018-01-23 NOTE — PROGRESS NOTES
Pt is being discharged from the hospital.  D/c instructions were reviewed with him and prescriptions were given. Pt verbalized understanding. IV and telemetry box were removed. Pt's wife to transport home.

## 2018-01-23 NOTE — DISCHARGE SUMMARY
Hospitalist Discharge Summary     Patient ID:  Darvin Ramirez  [id number]  69 y.o.  1945    PCP on record: Adeline Fontana MD    Admit date: 1/12/2018  Discharge date and time: 1/23/2018      DISCHARGE DIAGNOSIS  Atrial fib with RVR, HTN and CKD. Now rate controlled  Cardiology following, plan to cont amiodarone  Hx of cardiomyopathy with EF 30% s/p BiV ICD, last EF normalized  Hx of PAF s/p cardioversion had been in sinus on xarelto and amiodarone  Holding xarelto until discharge per surgery  Started heparin subq  Has elevated TSH, will check TFT's, on BB, will request cardiology consult     Weight loss. Intermittent vomiting  Colon mass, gastritis, duodenitis  GI/surgery  Inputs and recommendations appreciated. S/p Egd/colonoscopy, EGD with esophageal ulcers on peocid, colonoscopy with suspected colon cancer, tubular and villous adnoma  Path reports reviewed. S/p Cystoscopy with insertion of left ureteral stent and laparoscopic lt colectomy  Tolerating diet  Path Villous adenoma, 3.0 cm, with focal high-grade dysplasia; negative for invasive carcinoma   Regional lymph nodes: Twenty-seven (27) reactive lymph nodes, negative for carcinoma   Tubular adenoma (0.8 cm), Biopsy sites, at least one associated with submucosal abscess   D/w Dr. Destinee Bhatt, will need to follow-up OP in 4-6 weeks, rec to cont PPI     Hyperthyroidism, new dx  Started on methimazole   Appreciate endocrine consult, follow-up OP  TSH in 6-8 weeks    CT chest. Needs OP follow up. Centrilobular emphysema, 4.6 cm fusiform ascending aortic aneurysm,  interstitial septal thickening which may reflect mild edema or chronic  interstitial disease.       Acutely elevated LFTs  Hx hepatic steatosis  No abdominal pain  Follow up LFT , slowly trending down. Unremarkable CT through the abdomen and pelvis on 12/22/2017.   GI following      Aspiration with thin liquids on MBS  [employer] thick liquids once resume diet, advancing slowly  S/p EGD ,Small proximal antral nodule--submucosal  Distal esophageal ulcers,Successful empiric dilation of the ge junction. Pepcid     Generalized weakness  Likely secondary to deconditioning  PT/OT     Body mass index is 23.21 kg/(m^2).    Body mass index is 23.54 kg/(m^2).     Code status: Full  Prophylaxis: heparin  Recommended Disposition: Home w/Family      CONSULTATIONS:  IP CONSULT TO GASTROENTEROLOGY  IP CONSULT TO COLORECTAL SURGERY    Excerpted HPI from H&P of Doreatha Phalen, MD:    CHIEF COMPLAINT:  SOB     HISTORY OF PRESENT ILLNESS:     Nemesio Dallas is a 71 y.o.  male with CMO, p. afib who is sent from endoscopy to ER for afib with rvr. Patient due to have egd/colonoscopy today with Dr. Lucas Romano to evaluate 40+ lb weight loss in past 5 months with intermittent episodes of vomiting after eating. Had CT abdomen/pelvis that was normal. Also had abd us and modified barium swallow showing aspiration with thin liquids. He does not like to use thickener and stopped it after a few times. After being sent to erroneous locations on campus for endoscopy, he arrived to correct location and told anesthesiologist he was SOB and found to be in afib with RVR HR in 110s. Apparently was SR last week. Been off anticoagulation with xarelto since Tuesday. Evaluated by Dr. Marcos Villalpando and sent to ER for admission.       GARCIA and nonproductive cough chronically. Denies CP, dizziness, HA.     We were asked to admit for work up and evaluation of the above problems. ______________________________________________________________________  DISCHARGE SUMMARY/HOSPITAL COURSE:  for full details see H&P, daily progress notes, labs, consult notes. _______________________________________________________________________  Patient seen and examined by me on discharge day. Pertinent Findings:  Gen:    Not in distress  Chest: Clear lungs  CVS:   Regular rhythm.   No edema  Abd:  Soft, not distended, not tender  Neuro:  Alert, awake, oriented x3, grossly intact  _______________________________________________________________________  DISCHARGE MEDICATIONS:   Discharge Medication List as of 1/23/2018 12:51 PM      START taking these medications    Details   methIMAzole (TAPAZOLE) 10 mg tablet Take 2 Tabs by mouth two (2) times a day. With food, Normal, Disp-120 Tab, R-11      valsartan (DIOVAN) 80 mg tablet Take 1 Tab by mouth daily. , Print, Disp-30 Tab, R-0      dilTIAZem CD (CARDIZEM CD) 180 mg ER capsule Take 1 Cap by mouth daily. , Print, Disp-30 Cap, R-0      lansoprazole (PREVACID) 30 mg capsule Take 1 Cap by mouth Daily (before breakfast) for 30 days. , Print, Disp-30 Cap, R-0      OTHER CBC, CMP, LFT's in 1-2 weeks  TSh, Free T 4, total T3 in 6-8 weeks, Print, Disp-1 Act, R-0      ondansetron hcl (ZOFRAN, AS HYDROCHLORIDE,) 4 mg tablet Take 1 Tab by mouth every eight (8) hours as needed for Nausea. , Print, Disp-20 Tab, R-0      oxyCODONE IR (ROXICODONE) 5 mg immediate release tablet Take 1 Tab by mouth every four (4) hours as needed. Max Daily Amount: 30 mg. Indications: Pain, Print, Disp-30 Tab, R-0         CONTINUE these medications which have CHANGED    Details   amiodarone (PACERONE) 400 mg tablet Take 1 Tab by mouth daily. , Print, Disp-30 Tab, R-0         CONTINUE these medications which have NOT CHANGED    Details   rivaroxaban (XARELTO) 20 mg tab tablet Take 20 mg by mouth daily. , Historical Med      metoprolol succinate (TOPROL-XL) 50 mg XL tablet Take 50 mg by mouth daily. , Historical Med         STOP taking these medications       B.infantis-B.ani-B.long-B.bifi (PROBIOTIC 4X) 10-15 mg TbEC Comments:   Reason for Stopping:         losartan (COZAAR) 100 mg tablet Comments:   Reason for Stopping:         potassium chloride SA (MICRO-K) 10 mEq capsule Comments:   Reason for Stopping:         famotidine (PEPCID) 20 mg tablet Comments:   Reason for Stopping:         furosemide (LASIX) 40 mg tablet Comments: Reason for Stopping:               My Recommended Diet, Activity, Wound Care, and follow-up labs are listed in the patient's Discharge Insturctions which I have personally completed and reviewed. ______________________________________________________________________    Risk of deterioration: Low    Condition at Discharge:  Stable  ______________________________________________________________________    Disposition  Home with family and home health services  ______________________________________________________________________    Care Plan discussed with:   Patient, Family, RN, Care Manager, Consultant    ______________________________________________________________________    Code Status: Full Code  ______________________________________________________________________      Follow up with:   PCP : Troy Du MD  Follow-up Information     Follow up With Details 55[address] Lorna Christy MD On 1/26/2018 10;00am  for a post hospital follow up appointment with your PCP. Follow-up labs [address]  1st Floor  [place] [address]    Chris Ceja MD In 2 weeks hospital follow-up. Dr. Mac Pablo nurse will call you to make the appointment. call them if you have not heard from them by tomorrow afternoon.  [address]  [address] [facility]  [phone]      Mary Grewal MD In 5 weeks  [address] [facility]  [address] [facility] Zendejas [facility] In 6 weeks hospital follow-up [address] [facility] [address] [facility]  [phone]      [facility] In 4 weeks 4.6 cm fusiform ascending aortic aneurysm, [address] [facility]  [phone]    [facility] In 3 weeks ureteral stent [address] [facility] [address] [facility] [id number] Total time in minutes spent coordinating this discharge (includes going over instructions, follow-up, prescriptions, and preparing report for sign off to her PCP) :  60 minutes    Signed:  Lonnie Davenport MD

## 2018-01-23 NOTE — DISCHARGE INSTRUCTIONS
Dr. Kesha Maza discharge instructions:    1) It appears you have a condition called hyperthyroidism that is the cause of your symptoms of fatigue, weight loss, and shortness of breath. You will need to take a medication called methimazole to help slow down your metabolism to help treat these symptoms. Often patients need treatment for 12-18 months to try and normalize your levels. 2) Begin methimazole 10 mg tablet and take 2 tabs with breakfast and dinner until you come back to see me. This will be ready for  at the pharmacy today. 3) Please watch for the following side effects from methimazole and let me know if you develop any of them:  - rash (in up to 5% of patients)  - nausea/vomiting (as long as you take this with food, this shouldn't be an issue)  - joint pains (usually is only seen in elderly patients)  - abnormal liver function tests (if you develop pain under the right side of your rib cage, or nausea/vomiting, or jaundice where your eyes are turning yellow, please stop the med immediately and call me)  - low white blood cell count (if you develop a fever or sore throat, usually this is from a viral infection, however, if this persists for more than 3 days, please let me know so we can draw your blood count just to make sure your white blood cell count isn't going low)    4) Our office will call you to arrange a follow up appointment in 6-8 weeks. Our office is located at:  [address] [place] [address], 3rd floor, [address]  [phone] (phone)  [phone] (fax)    5) Please go to your local [facility] 3-4 days before your next appointment to have your labs drawn. The order is already in the computer and ask to have your labs drawn under Dr. Kesha Maza name. 6) Please don't hesitate to call [phone] with further questions.                             HOSPITALIST DISCHARGE INSTRUCTIONS    NAME: Suma Mendez :  1945   MRN:  [id number]     Date/Time:  2018 11:54 AM    ADMIT DATE: 2018   DISCHARGE DATE: 2018         · It is important that you take the medication exactly as they are prescribed. · Keep your medication in the bottles provided by the pharmacist and keep a list of the medication names, dosages, and times to be taken in your wallet. · Do not take other medications without consulting your doctor. What to do at Home    Recommended diet:  Cardiac Diet    Recommended activity: Activity as tolerated and PT/OT per Home Health      If you have questions regarding the hospital related prescriptions or hospital related issues please call [facility] at [phone]. You can always direct your questions to your primary care doctor if you are unable to reach your hospital physician; your PCP works as an extension of your hospital doctor just like your hospital doctor is an extension of your PCP for your time at the hospital [facility])    If you experience any of the following symptoms then please call your primary care physician or return to the emergency room if you cannot get hold of your doctor:    Fever, chills, nausea, vomiting, or persistent diarrhea  Worsening weakness or new problems with your speech or balance  Dark stools or visible blood in your stools  New Leg swelling or shortness of breath as these could be signs of a clot    Additional Instructions:      Bring these papers with you to your follow up appointments. The papers will help your doctors be sure to continue the care plan from the hospital.              Information obtained by :  I understand that if any problems occur once I am at home I am to contact my physician. I understand and acknowledge receipt of the instructions indicated above.                                                                                                                                            Physician's or R.N.'s Signature Date/Time                                                                                                                                              Patient or Representative Signature

## 2018-01-23 NOTE — PROGRESS NOTES
Endocrinology Progress Note    Saw pt this morning. Will leave full consult later today. Labs are pending. Will start methimazole 20 mg daily pending lab results. Should be okay for discharge from my perspective and I will arrange for outpatient follow up in 6-8 weeks. Please don't hesitate to call [phone] with further questions.     Chinedu Li MD

## 2018-01-23 NOTE — TELEPHONE ENCOUNTER
Neha, at [facility], called to schedule a hospital follow-up appointment for this patient. Do you have a day and time and I will contact the patient directly for this?

## 2018-01-23 NOTE — CONSULTS
Chief Complaint   Patient presents with    Irregular Heart Beat     Arrives via stretcher from OP endo for EGD and colonoscopy with afib     History of Present Illness: Nick Centeno is a 71 y.o. male who I was asked to see by Dr. Aicha Devine for evaluation of hyperthyroidism. He states he was told he had some type of thyroid condition by his PCP, Dr. Dakota Fontaine, but was never put on any treatment. He has lost about 50 lbs over the past 5-6 months and has had increased fatigue, shortness of breath, loose bowels, and some heat intolerance with activity. Overall has felt weaker. Has not noticed any chest pain or palpitations or tremors. His son has some type of thyroid condition and takes levothyroxine. He was initially admitted from the endoscopy suite with a-fib and then ended up having a colectomy by Dr. Caleb Gibbons that showed a benign colon mass. As part of his evaluation for weight loss, he had a TSH drawn that was < 0.01 yesterday and was repeated this morning and still < 0.01 and his free T4 is high at > 8.0. I drew at T3 and TSH receptor ab level this morning and both are pending.     Past Medical History:   Diagnosis Date    Arrhythmia     afib    Arthritis     Atrial fibrillation ([address] Dx 5/19/14    Dr Zeyad Gongora [phone]    Hypertension     Hypertrophy (benign) of prostate     Thromboembolus [facility]) 2003    s/p Lt knee surgery (was on Coumadin x3 yr)     Past Surgical History:   Procedure Laterality Date    COLONOSCOPY N/A 1/15/2018    COL[facility]  1/15/2018         Annie Corley  1/15/2018         HX ORTHOPAEDIC      back surgery no hardware    HX ORTHOPAEDIC  2014    right knee surgery arthroscopy    HX ORTHOPAEDIC  3/23/15    REVISION TO LEFT TOTAL KNEE REPLACEMENT    HX PACEMAKER  8/2014    3 wire    TOTAL KNEE ARTHROPLASTY  2003    Left    TOTAL KNEE ARTHROPLASTY  2009    Right    UPPER GI ENDOSCOPY,BIOPSY  1/15/2018          Current Facility-Administered Medications   Medication Dose Route Frequency    methIMAzole (TAPAZOLE) tablet 20 mg  20 mg Oral BID    amiodarone (CORDARONE) tablet 400 mg  400 mg Oral DAILY    oxyCODONE IR (ROXICODONE) tablet 5 mg  5 mg Oral Q4H PRN    oxyCODONE IR (ROXICODONE) tablet 10 mg  10 mg Oral Q4H PRN    heparin (porcine) injection 5,000 Units  5,000 Units SubCUTAneous Q12H    dilTIAZem CD (CARDIZEM CD) capsule 180 mg  180 mg Oral DAILY    metoprolol succinate (TOPROL-XL) XL tablet 50 mg  50 mg Oral DAILY    valsartan (DIOVAN) tablet 80 mg  80 mg Oral DAILY    sodium chloride (NS) flush 5-10 mL  5-10 mL IntraVENous Q8H    sodium chloride (NS) flush 5-10 mL  5-10 mL IntraVENous PRN    acetaminophen (TYLENOL) tablet 650 mg  650 mg Oral Q6H PRN    ondansetron (ZOFRAN) injection 4 mg  4 mg IntraVENous Q6H PRN    famotidine (PEPCID) tablet 20 mg  20 mg Oral BID     No Known Allergies     Family History   Problem Relation Age of Onset    Cancer Mother      throat cancer    Cancer Father      kidney cancer    Hypertension Sister      Social History     Social History    Marital status:      Spouse name: N/A    Number of children: N/A    Years of education: N/A     Occupational History    Not on file.      Social History Main Topics    Smoking status: Former Smoker     Packs/day: 1.00     Years: 30.00     Quit date: 5/23/2004    Smokeless tobacco: Never Used    Alcohol use No      Comment: no alcohol for the pask 6 months    Drug use: No    Sexual activity: Not on file     Other Topics Concern    Not on file     Social History Narrative     Review of Systems:  - Constitutional Symptoms: (+) weight loss  - Eyes: no blurry vision or double vision  - Cardiovascular: no chest pain or palpitations  - Respiratory: no cough, (+) shortness of breath  - Gastrointestinal: no dysphagia, mild abdominal pain  - Musculoskeletal: no joint pains, (+) mild weakness  - Integumentary: no rashes  - Neurological: no numbness, tingling, or headaches  - Psychiatric: no depression or anxiety  - Endocrine: (+) mild heat intolerance    Physical Examination:  Blood pressure (!) 133/91, pulse 91, temperature 98.4 °F (36.9 °C), resp. rate 16, height 6' 4\" (1.93 m), weight 193 lb 6.4 oz (87.7 kg), SpO2 96 %. - General: pleasant, no distress, good eye contact  - HEENT: no exopthalmos, no periorbital edema, no scleral/conjunctival injection, EOMI, mild lid lag, no stare  - Neck: supple, (+) thyromegaly 1.5x normal but can't feel discrete nodules, no thyroid bruits, no masses, lymph nodes, or carotid bruits, no supraclavicular or dorsocervical fat pads  - Cardiovascular: irregular, normal rate, normal S1 and S2, no murmurs/rubs/gallops   - Respiratory: clear to auscultation bilaterally  - Gastrointestinal: soft, nontender, nondistended, no masses, no hepatosplenomegaly  - Musculoskeletal: no proximal muscle weakness in upper or lower extremities  - Integumentary: no acanthosis nigricans, no abdominal striae, no rashes, no edema  - Neurological: (+) tremor  - Psychiatric: normal mood and affect    Data Reviewed:   Component      Latest Ref Rng & Units 1/23/2018 1/23/2018 1/22/2018           4:01 AM  4:01 AM  5:13 AM   TSH      0.36 - 3.74 uIU/mL  <0.01 (L) <0.01 (L)   T4, Free      0.8 - 1.5 NG/DL >8.0 (H)         Assessment/Plan:   1. Hyperthyroidism: It appears he has hyperthyroidism that may be the cause of a lot of his symptoms. This is actually quite severe in that his TSH is < 0.01 and FT4 is > 8.0. His T3 is pending. Likely has Grave's disease but his TSH receptor ab is pending and I will follow this up as an outpatient. I will start him on methimazole 20 mg bid and will arrange for outpatient follow up in my office. I have reviewed the side effects of this medication and have listed all of the pertinent instructions the discharge sections.   I have already sent the methimazole to his pharmacy so he is stable for discharge from my perspective whenever deemed appropriate by the hospitalist.  - begin methimazole 20 mg bid  - f/u on T3 and TSH receptor ab levels  - will arrange for outpatient f/u in 6-8 weeks  - will check TSH, free T4 and Total T3 prior to next visit         Thanks for the consult. Please don't hesitate to call [phone] with further questions.       Edson Fu MD    Copy sent to:  Dr. Christine Pires

## 2018-01-23 NOTE — TELEPHONE ENCOUNTER
He has already been discharged so call him this afternoon to offer 3/8/18 at 2:10pm.  Nestor Dues him that I sent the prescription for methimazole to his pharmacy and to have his labs drawn 2-3 days prior to the visit with me and the order is already in the system at [address] [place] [address].

## 2018-01-23 NOTE — PROGRESS NOTES
Progress Note      1/23/2018 7:18 PM  NAME: Kvng Dunbar   MRN:  [id number]   Admit Diagnosis: Atrial fibrillation with RVR ([address]  dysphagia, wt loss  COLON MASS      Assessment:     1. Weight loss, difficulty swallowing, CT of abd/pelvis unrevealing, after bowel prep found to be in Afib with RVR  2. Stress 2014 with no ischemia  3. Hx of cardiomyopathy with EF 30% s/p BiV ICD, last EF normalized  4. Hx of PAF s/p cardioversion had been in sinus on xarelto and amiodarone  5. HTN  6. CKD  7. S/p bilteral Knee replacement, hx of prosthesis infection    Severe weight loss, >40lbs, difficulty swallowing, nausea, undergoing GI evaluation. Here to have EGD and Colonoscopy. Found to be in afib with RVR  with dyspnea. Procedures canceled and pt admitted for further management. 6. 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 Hared to tell . In any case the rate is controlled. OK to proceed with studies tomorrow. 1/15 Endoscopy shows ulceration of the distal esophagus. Colonoscopy showed a mass in the descending Colon which is c/w colon CA. Was biopsied. For colorectal consult. Back in A fib. Rate is controlled. Continue on lovenox and transition back to Xarelto when OK     1/16 HR is still up a little. Add Cardizem for rate control      1/17 Seen in AM on rounds . palns for surgery noted . Rate controlled. Still in A fib . Can use IV Cardizem if needed for rate control robles op. 1/19 s/p partial colectomy. Remains in A Fib with controlled rate. He is having abdominal pain and has not been oob much. On liquid diet        1/20:  GARCIA is new; post-op vs AFib contribution. Remains in AFib; intermittent BiV pacing; rate controlled. On Sq heparin; resume xarelto at surgeons discretion. Cont dilt/metop/amio. UOOB.   Can consider KIM/DCCV is remains symptomatic.    1/21:  Remains in AFib. Still w/ GARCIA. WBC up slightly. Inc amio to 400mg TID. NPO p MN should Dr. Maribell Hooker want to consider DCCV. UOOB. Will need xarelto if this is the case. Continue dilt and metop. On SQ heparin. 1/22 Remains in A fib with rate controlled. He  Has plenty of Amiodarone on board and I will resume daily dosing. I do not plan to do a cardioversion in this setting. Would plan to wait until he is over the stress of his current issues /  Surgery , etc.  Would have a good chance of going back into A fib so no reason to CV at this time. He has been in and out of SR on his own here. Does need to be on oral anticoagulation when OK with surgerery. 1/23 A Fib with rate controlled. Possible hyperthyroid noted. Dr Lucretia Augustin to review further studies. OK to f/u as OP    Resume Xarelto as OP        Plan:     Continue on meds. See orders     Resume diet. [x]        High complexity decision making was performed    Subjective:     Lolly Sides denies chest pain. ABD pain better. GARCIA is new. Discussed with RN events overnight. 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  Most recent are:    Visit Vitals  BP (!) 133/91 (BP 1 Location: Right leg, BP Patient Position: At rest)    Pulse 91    Temp 98.4 °F (36.9 °C)    Resp 16    Ht 6' 4\" (1.93 m)    Wt 87.7 kg (193 lb 6.4 oz)    SpO2 96%    BMI 23.54 kg/m2     No intake or output data in the 24 hours ending 01/23/18 0834     General Appearance: Well developed, well nourished, alert & oriented x 3,    no acute distress. Ears/Nose/Mouth/Throat: Hearing grossly normal.  Neck: Supple. Chest: Lungs clear to auscultation bilaterally. Cardiovascular: Irregular rate and rhythm, S1S2 normal, no murmur. Abdomen: Soft, non-tender, bowel sounds are active. Extremities: No edema bilaterally. Skin: Warm and dry. PMH/SH reviewed - no change compared to H&P    Data Review    Telemetry: AFib w/ intermittent BiV pacing    Lab Data Personally Reviewed:    Recent Labs      01/23/18   0401  01/22/18   0513   WBC  7.7  8.7   HGB  12.1  12.3   HCT  35.5*  36.6   PLT  219  217   LABRCNT(INR:3,PTP:3,APTT:3,)  Recent Labs      01/23/18   0401  01/22/18   0513  01/21/18   0228   NA  136  137  136   K  4.4  4.6  4.7   CL  104  104  105   CO2  23  26  26   BUN  19  21*  26*   CREA  0.70  0.66*  0.74   GLU  106*  88  104*   CA  8.4*  8.3*  8.4*   MG   --   1.8  1.9   LABRCNT(CPK:3,CpKMB:3,ckndx:3,troiq:3)No results found for: CHOL, CHOLX, CHLST, CHOLV, HDL, LDL, LDLC, DLDLP, TGLX, TRIGL, TRIGP, CHHD, CHHDXLABRCNT(sgot:3,gpt:3,ap:3,tbiL:3,TP:3,ALB:3,GLOB:3,ggt:3,aml:3,amyp:3,lpse:3,hlpse:3)No results for input(s): PH, PCO2, PO2 in the last 72 hours. No results found for: CHOL, CHOLX, CHLST, CHOLV, HDL, LDL, LDLC, DLDLP, TGLX, TRIGL, TRIGP, CHHD, CHHDXMEDTABLETimmali Ross MD  No results for input(s): PH, PCO2, PO2 in the last 72 hours.     Medications Personally Reviewed:    Current Facility-Administered Medications   Medication Dose Route Frequency    amiodarone (CORDARONE) tablet 400 mg  400 mg Oral DAILY    oxyCODONE IR (ROXICODONE) tablet 5 mg  5 mg Oral Q4H PRN    oxyCODONE IR (ROXICODONE) tablet 10 mg  10 mg Oral Q4H PRN    heparin (porcine) injection 5,000 Units  5,000 Units SubCUTAneous Q12H    dilTIAZem CD (CARDIZEM CD) capsule 180 mg  180 mg Oral DAILY    metoprolol succinate (TOPROL-XL) XL tablet 50 mg  50 mg Oral DAILY    valsartan (DIOVAN) tablet 80 mg  80 mg Oral DAILY    sodium chloride (NS) flush 5-10 mL  5-10 mL IntraVENous Q8H    sodium chloride (NS) flush 5-10 mL  5-10 mL IntraVENous PRN    acetaminophen (TYLENOL) tablet 650 mg  650 mg Oral Q6H PRN    ondansetron (ZOFRAN) injection 4 mg  4 mg IntraVENous Q6H PRN    famotidine (PEPCID) tablet 20 mg  20 mg Oral BID         Kenya Graham MD

## 2018-01-24 ENCOUNTER — TELEPHONE (OUTPATIENT)
Dept: ENDOCRINOLOGY | Age: 73
End: 2018-01-24

## 2018-01-24 LAB
T3 SERPL-MCNC: 229 NG/DL (ref 71–180)
TSH RECEP AB SER-ACNC: 1.98 IU/L (ref 0–1.75)

## 2018-01-24 NOTE — PROGRESS NOTES
Home care agency was changed from [facility] care to [facility] per patient/family request. When notified patient he sounded short of breath and explained he may need to come back to the hospital ER. Education provided on [employer]. Patient agreed to have [address]. Request for home visit placed to [employer] at 1:20 PM today.      [phone]

## 2018-02-06 ENCOUNTER — TELEPHONE (OUTPATIENT)
Dept: ENDOCRINOLOGY | Age: 73
End: 2018-02-06

## 2018-02-06 RX ORDER — METHIMAZOLE 10 MG/1
30 TABLET ORAL 2 TIMES DAILY
Qty: 180 TAB | Refills: 11 | Status: SHIPPED | OUTPATIENT
Start: 2018-02-06 | End: 2018-04-22 | Stop reason: SDUPTHER

## 2018-02-06 NOTE — TELEPHONE ENCOUNTER
Please let him know that he does not need to be seen for a sooner appointment. If he does not feel like this dose of methimazole is helping, then I'm happy to increase his dose to 3 tabs twice daily until he comes for his follow up visit and sent a new prescription to his pharmacy for this updated dose. His thyroid levels were very elevated in the hospital and often it takes a full 2 months before patients start feeling better and it is not surprising that after 2 weeks he is not feeling much better but hopefully the higher dose will work better until he comes back to see me.

## 2018-02-06 NOTE — TELEPHONE ENCOUNTER
.Pt notified of message per Dr. Obinna Medina and voiced understanding of what was read to him. He stated that he will increase it to 3 pills twice a day and will  the new script when he needs it.

## 2018-02-06 NOTE — TELEPHONE ENCOUNTER
----- Message from Mercedes Chu sent at 2/6/2018  1:11 PM EST -----  Regarding: Brendon/telephone  Pt stated he is taking Methimazole and he stated he does not feel like it is helping. Pts number is [phone].

## 2018-02-06 NOTE — TELEPHONE ENCOUNTER
Patient stated that Dr. Godwin Gunderson saw him in the hospital and was put on Methimazole. Patient verbalized that he does not feel that the medication is working. He is complaining of not having an appetite, vomiting and not gaining weight. He stated that he is scheduled to see Dr. Godwin Gunderson on 3/8/2018 but would like to come in for a sooner appointment.

## 2018-02-13 ENCOUNTER — OFFICE VISIT (OUTPATIENT)
Dept: NEUROLOGY | Age: 73
End: 2018-02-13

## 2018-02-13 VITALS
WEIGHT: 194 LBS | BODY MASS INDEX: 23.62 KG/M2 | RESPIRATION RATE: 16 BRPM | DIASTOLIC BLOOD PRESSURE: 76 MMHG | HEART RATE: 75 BPM | HEIGHT: 76 IN | OXYGEN SATURATION: 98 % | SYSTOLIC BLOOD PRESSURE: 124 MMHG | TEMPERATURE: 98 F

## 2018-02-13 DIAGNOSIS — E05.90: ICD-10-CM

## 2018-02-13 DIAGNOSIS — E88.9 METABOLIC MYOPATHY: ICD-10-CM

## 2018-02-13 DIAGNOSIS — G73.7: ICD-10-CM

## 2018-02-13 DIAGNOSIS — E55.9 VITAMIN D DEFICIENCY: ICD-10-CM

## 2018-02-13 DIAGNOSIS — E05.90 HYPERTHYROIDISM: ICD-10-CM

## 2018-02-13 DIAGNOSIS — E53.8 B12 DEFICIENCY: ICD-10-CM

## 2018-02-13 DIAGNOSIS — R53.1 WEAKNESS: ICD-10-CM

## 2018-02-13 DIAGNOSIS — R13.12 OROPHARYNGEAL DYSPHAGIA: Primary | ICD-10-CM

## 2018-02-13 DIAGNOSIS — G73.7 METABOLIC MYOPATHY: ICD-10-CM

## 2018-02-13 RX ORDER — AMITRIPTYLINE HYDROCHLORIDE 10 MG/1
10-20 TABLET, FILM COATED ORAL
Qty: 100 TAB | Refills: 5 | Status: SHIPPED | OUTPATIENT
Start: 2018-02-13 | End: 2018-03-08 | Stop reason: SDUPTHER

## 2018-02-13 NOTE — PATIENT INSTRUCTIONS
Please be advised there is a $25 fee for all paperwork to be completed from our  providers. This is to be paid by the patient prior to picking up the completed forms. A Healthy Lifestyle: Care Instructions  Your Care Instructions    A healthy lifestyle can help you feel good, stay at a healthy weight, and have plenty of energy for both work and play. A healthy lifestyle is something you can share with your whole family. A healthy lifestyle also can lower your risk for serious health problems, such as high blood pressure, heart disease, and diabetes. You can follow a few steps listed below to improve your health and the health of your family. Follow-up care is a key part of your treatment and safety. Be sure to make and go to all appointments, and call your doctor if you are having problems. It's also a good idea to know your test results and keep a list of the medicines you take. How can you care for yourself at home? · Do not eat too much sugar, fat, or fast foods. You can still have dessert and treats now and then. The goal is moderation. · Start small to improve your eating habits. Pay attention to portion sizes, drink less juice and soda pop, and eat more fruits and vegetables. ¨ Eat a healthy amount of food. A 3-ounce serving of meat, for example, is about the size of a deck of cards. Fill the rest of your plate with vegetables and whole grains. ¨ Limit the amount of soda and sports drinks you have every day. Drink more water when you are thirsty. ¨ Eat at least 5 servings of fruits and vegetables every day. It may seem like a lot, but it is not hard to reach this goal. A serving or helping is 1 piece of fruit, 1 cup of vegetables, or 2 cups of leafy, raw vegetables. Have an apple or some carrot sticks as an afternoon snack instead of a candy bar. Try to have fruits and/or vegetables at every meal.  · Make exercise part of your daily routine.  You may want to start with simple activities, such as walking, bicycling, or slow swimming. Try to be active 30 to 60 minutes every day. You do not need to do all 30 to 60 minutes all at once. For example, you can exercise 3 times a day for 10 or 20 minutes. Moderate exercise is safe for most people, but it is always a good idea to talk to your doctor before starting an exercise program.  · Keep moving. Vanessa Bernheim the lawn, work in the garden, or [employer]. Take the stairs instead of the elevator at work. · If you smoke, quit. People who smoke have an increased risk for heart attack, stroke, cancer, and other lung illnesses. Quitting is hard, but there are ways to boost your chance of quitting tobacco for good. ¨ Use nicotine gum, patches, or lozenges. ¨ Ask your doctor about stop-smoking programs and medicines. ¨ Keep trying. In addition to reducing your risk of diseases in the future, you will notice some benefits soon after you stop using tobacco. If you have shortness of breath or asthma symptoms, they will likely get better within a few weeks after you quit. · Limit how much alcohol you drink. Moderate amounts of alcohol (up to 2 drinks a day for men, 1 drink a day for women) are okay. But drinking too much can lead to liver problems, high blood pressure, and other health problems. Family health  If you have a family, there are many things you can do together to improve your health. · Eat meals together as a family as often as possible. · Eat healthy foods. This includes fruits, vegetables, lean meats and dairy, and whole grains. · Include your family in your fitness plan. Most people think of activities such as jogging or tennis as the way to fitness, but there are many ways you and your family can be more active. Anything that makes you breathe hard and gets your heart pumping is exercise. Here are some tips:  ¨ Walk to do errands or to take your child to school or the bus. ¨ Go for a family bike ride after dinner instead of watching TV.   Where can you learn more? Go to http://tavon-zina.info/. Enter U279 in the search box to learn more about \"A Healthy Lifestyle: Care Instructions. \"  Current as of: May 12, 2017  Content Version: 11.4  © 8964-1928 Healthwise, [employer]. Care instructions adapted under license by [employer] (which disclaims liability or warranty for this information). If you have questions about a medical condition or this instruction, always ask your healthcare professional. [address] any warranty or liability for your use of this information.

## 2018-02-13 NOTE — MR AVS SNAPSHOT
[address], 
[address], [address] [facility] 
[phone] Patient: Nasir Poster MRN: [id number] Visit Information Date & Time Provider Department Dept. Phone Encounter #  
 2/13/2018  8:00 AM Tisha Antunez MD [facility] [phone] [id number] Follow-up Instructions Return in about 3 months (around 5/13/2018). Your Appointments 3/8/2018  2:10 PM  
Follow Up with MD Milton Santizo [facility]) [address] [phone] [address] [place] [address] Upcoming Health Maintenance Date Due Hepatitis C Screening 1945 DTaP/Tdap/Td series (1 - Tdap) 5/24/1967 FOBT Q 1 YEAR AGE 50-75 5/24/1996 ZOSTER VACCINE AGE 60> 3/24/2006 GLAUCOMA SCREENING Q2Y 5/24/2011 MEDICARE YEARLY EXAM 5/24/2011 Influenza Age 5 to Adult 8/1/2017 Pneumococcal 65+ Low/Medium Risk (2 of 2 - PPSV23) 10/1/2019 Allergies as of 2/13/2018  Review Complete On: 2/13/2018 By: Tisha Antunez MD  
 No Known Allergies Current Immunizations  Reviewed on 2/27/2015 Name Date Influenza Vaccine 10/1/2014 Pneumococcal Vaccine (Unspecified Type) 10/1/2014 Not reviewed this visit You Were Diagnosed With   
  
 Codes Comments Oropharyngeal dysphagia    -  Primary ICD-10-CM: R13.12 
ICD-9-CM: 787.22 Weakness     ICD-10-CM: R53.1 ICD-9-CM: 780.79 Vitamin D deficiency     ICD-10-CM: E55.9 ICD-9-CM: 268.9 Hyperthyroidism     ICD-10-CM: E05.90 ICD-9-CM: 242.90 Metabolic myopathy     ZKZ-39-VF: E88.9, G73.7 ICD-9-CM: 359.89   
 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  
 23.61 kg/m 2 2.17 m 2 Preferred Pharmacy Pharmacy Name Phone [facility]/PHARMACY #[id number]- [place] [id number] [facility] [phone] Your Updated Medication List  
  
   
This list is accurate as of: 2/13/18  8:56 AM.  Always use your most recent med list.  
  
  
  
  
 amiodarone 400 mg tablet Commonly known as:  Michelle Lame Take 1 Tab by mouth daily. amitriptyline 10 mg tablet Commonly known as:  ELAVIL Take 1-2 Tabs by mouth nightly. dilTIAZem  mg ER capsule Commonly known as:  CARDIZEM CD Take 1 Cap by mouth daily. lansoprazole 30 mg capsule Commonly known as:  PREVACID Take 1 Cap by mouth Daily (before breakfast) for 30 days. methIMAzole 10 mg tablet Commonly known as:  TAPAZOLE Take 3 Tabs by mouth two (2) times a day. With food  
  
 metoprolol succinate 50 mg XL tablet Commonly known as:  TOPROL-XL Take 50 mg by mouth daily. valsartan 80 mg tablet Commonly known as:  DIOVAN Take 1 Tab by mouth daily. XARELTO 20 mg Tab tablet Generic drug:  rivaroxaban Take 20 mg by mouth daily. Prescriptions Sent to Pharmacy Refills  
 amitriptyline (ELAVIL) 10 mg tablet 5 Sig: Take 1-2 Tabs by mouth nightly. Class: Normal  
 Pharmacy: [facility]/pharmacy #[id number]- [address] Ph #: [phone] Route: Oral  
  
We Performed the Following GLORIA COMPREHENSIVE PLUS PANEL [YCT37000 Custom] ANTINEURONAL CELL AB [id number] CPT(R)] CK J213431 CPT(R)] IMMUNOELECTROPHORESIS [facility] [id number] CPT(R)] MAGNESIUM C7476838 CPT(R)] MYASTHENIA GRAVIS EVALUATION [id number] CPT(R)] SED RATE (ESR) [id number] CPT(R)] VITAMIN B12 & FOLATE [28431 CPT(R)] VITAMIN D, 25 HYROXY PANEL [[id number] Custom] Follow-up Instructions Return in about 3 months (around 2018). To-Do List   
 2018 9:00 AM  
  Appointment with Zbigniew Belle MD; [facility] at [facility] ([phone]) **Exams including a Small Bowel series may take up to 4 hours. Patient needs to register 30 minutes prior to exam.  1.  Adults:  Nothing to eat or drink after midnight. 2.  [place] to 6 months:  Nothing to eat or drink for 3 hours prior to the study. 3.  6 months to 1 year:  Nothing to eat or drink for 4 hours prior to the study. 4.  1 year to 4 years:  Nothing to eat or drink after midnight except for routine medications, if early morning study.  Otherwise, nothing to eat or drink 4 hours prior to study. 5.  5 years to 12 years:  Nothing to eat or drink after midnight except for routine medications, if early morning study.  Otherwise, nothing to eat or drink 6 hours prior to study.  6.  13 years to 18 years:  Nothing to eat or drink after midnight except for routing medications, if early morning study.  Otherwise, nothing to eat or drink 8 hours prior to study.  7.  You must bring a LIST or BAG of all current medication you are taking with you to your appointment. 2018 Neurology:  [facility] LIMITED   
  
 2018 Neurology:  [facility] NCV MOTOR WO F/WAVE PER NERVE   
  
 2018 Neurology:  [facility] NCV SENSORY PER NERVE Patient Instructions Please be advised there is a $25 fee for all paperwork to be completed from our  providers. This is to be paid by the patient prior to picking up the completed forms. A Healthy Lifestyle: Care Instructions Your Care Instructions A healthy lifestyle can help you feel good, stay at a healthy weight, and have plenty of energy for both work and play.  A healthy lifestyle is something you can share with your whole family. A healthy lifestyle also can lower your risk for serious health problems, such as high blood pressure, heart disease, and diabetes. You can follow a few steps listed below to improve your health and the health of your family. Follow-up care is a key part of your treatment and safety. Be sure to make and go to all appointments, and call your doctor if you are having problems. It's also a good idea to know your test results and keep a list of the medicines you take. How can you care for yourself at home? · Do not eat too much sugar, fat, or fast foods. You can still have dessert and treats now and then. The goal is moderation. · Start small to improve your eating habits. Pay attention to portion sizes, drink less juice and soda pop, and eat more fruits and vegetables. ¨ Eat a healthy amount of food. A 3-ounce serving of meat, for example, is about the size of a deck of cards. Fill the rest of your plate with vegetables and whole grains. ¨ Limit the amount of soda and sports drinks you have every day. Drink more water when you are thirsty. ¨ Eat at least 5 servings of fruits and vegetables every day. It may seem like a lot, but it is not hard to reach this goal. A serving or helping is 1 piece of fruit, 1 cup of vegetables, or 2 cups of leafy, raw vegetables. Have an apple or some carrot sticks as an afternoon snack instead of a candy bar. Try to have fruits and/or vegetables at every meal. 
· Make exercise part of your daily routine. You may want to start with simple activities, such as walking, bicycling, or slow swimming. Try to be active 30 to 60 minutes every day. You do not need to do all 30 to 60 minutes all at once. For example, you can exercise 3 times a day for 10 or 20 minutes.  Moderate exercise is safe for most people, but it is always a good idea to talk to your doctor before starting an exercise program. 
 · Keep moving. Mayo Stewart the lawn, work in the garden, or [employer]. Take the stairs instead of the elevator at work. · If you smoke, quit. People who smoke have an increased risk for heart attack, stroke, cancer, and other lung illnesses. Quitting is hard, but there are ways to boost your chance of quitting tobacco for good. ¨ Use nicotine gum, patches, or lozenges. ¨ Ask your doctor about stop-smoking programs and medicines. ¨ Keep trying. In addition to reducing your risk of diseases in the future, you will notice some benefits soon after you stop using tobacco. If you have shortness of breath or asthma symptoms, they will likely get better within a few weeks after you quit. · Limit how much alcohol you drink. Moderate amounts of alcohol (up to 2 drinks a day for men, 1 drink a day for women) are okay. But drinking too much can lead to liver problems, high blood pressure, and other health problems. Family health If you have a family, there are many things you can do together to improve your health. · Eat meals together as a family as often as possible. · Eat healthy foods. This includes fruits, vegetables, lean meats and dairy, and whole grains. · Include your family in your fitness plan. Most people think of activities such as jogging or tennis as the way to fitness, but there are many ways you and your family can be more active. Anything that makes you breathe hard and gets your heart pumping is exercise. Here are some tips: 
¨ Walk to do errands or to take your child to school or the bus. ¨ Go for a family bike ride after dinner instead of watching TV. Where can you learn more? Go to http://tavon-zina.info/. Enter H252 in the search box to learn more about \"A Healthy Lifestyle: Care Instructions. \" Current as of: May 12, 2017 Content Version: 11.4 © 6489-2155 Healthwise, Inc[employer].  Care instructions adapted under license by [employer] (which disclaims liability or warranty for this information). If you have questions about a medical condition or this instruction, always ask your healthcare professional. [address] any warranty or liability for your use of this information. Introducing [facility]! Dear Lauro Munguia: Thank you for requesting a Alfalight account. Our records indicate that you already have an active Alfalight account. You can access your account anytime at [email or web address] the [email or web address] the stars Did you know that you can access your hospital and ER discharge instructions at any time in Alfalight? You can also review all of your test results from your hospital stay or ER visit. Additional Information If you have questions, please visit the Frequently Asked Questions section of the Alfalight website at [email or web address] the [email or web address] Remember, Alfalight is NOT to be used for urgent needs. For medical emergencies, dial 911. Now available from your iPhone and Android! Please provide this summary of care documentation to your next provider. Your primary care clinician is listed as Troy Du. If you have any questions after today's visit, please call [phone].

## 2018-02-13 NOTE — PROGRESS NOTES
Consult  REFERRED BY:  April Pepper MD    CHIEF COMPLAINT: Abnormal swallowing, weight loss and weakness      Subjective:     Noemy Mondragon is a 71 y.o. right-handed  male we are asked to evaluate for new problem of progressive weakness, tremors, difficulty with swallowing, as a new patient to us, at the request of Dr. Theresa Murdock and Dr. Janette Watson. Patient was recently hospitalized for these problems, and had an episode of rapid A. fib, with a pulse of 160 when he was trying to get an endoscopy done, and had to have cardiac treatment and new medication to control his heart rate and A. fib. He has chronic atrial fibrillation and is on Xarelto. He was found to be markedly hyperthyroid, and was treated by Dr. Chhaya Celestin with Tapazole. His heart rate has gotten better, he has not had much weight gain yet, but his weight has stabilized. Today he weighed 184, and in the hospital he was 180. He still does not have much appetite. He does not complain of any dysphagia or difficulty swallowing, but he has this unusual reflux where occasionally he will have regurgitation of liquids, not always associated with eating or food and then will vomit. He is going to get an upper GI and small bowel follow-through I think tomorrow. 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 This was removed surgically and still recovering from his operation. He just feels generally weak, but feels that his strength is actually getting somewhat better since she has been home for 2 weeks time and getting some physical therapy and starting to eat more. He is even thinking about eating a hamburger now. He takes a can of Ensure once a day at least.  He has no family history of neuromuscular disease.   He had a modified barium swallow that showed there was slight aspiration of thin liquids with mild pooling in the vallecula which clears with repeated swallowing. He did not seem to have any difficulty with solids. He does notice occasional twitch in his muscles, but no major cramping, and no cranial nerve problems as far as blurred vision, double vision, or change in his voice or speech volume. He seems to have more proximal pelvic girdle weakness, and his arm strength to be better. He has no unusual fevers, no meningismus, no complaints of headache, no difficulty with his balance or coordination, denies any recent history of smoking, denies any significant alcohol intake, or drug exposure, or other causes of his weakness and illness. He does not have any real muscle pains or aches, and denies any unusual connective tissue disease major joint problems. He does not sleep well at nighttime, and would like to have a medicine that may help stimulate his appetite and help him sleep. He denies any major depression or anxiety as a cause of his symptoms.     Past Medical History:   Diagnosis Date    Arrhythmia     afib    Arthritis     Atrial fibrillation ([address] Dx 5/19/14    Dr Som Hernandez [phone]    Hypertension     Hyperthyroidism 1/23/2018    Hypertrophy (benign) of prostate     Thromboembolus [facility]) 2003    s/p Lt knee surgery (was on Coumadin x3 yr)      Past Surgical History:   Procedure Laterality Date    COLONOSCOPY N/A 1/15/2018    COLONOSCOPY performed by Merlin Miller MD at [facility]  1/15/2018         Kendra Montoya  1/15/2018         HX ORTHOPAEDIC      back surgery no hardware    HX ORTHOPAEDIC  2014    right knee surgery arthroscopy    HX ORTHOPAEDIC  3/23/15    REVISION TO LEFT TOTAL KNEE REPLACEMENT    HX PACEMAKER  8/2014    3 wire    TOTAL KNEE ARTHROPLASTY  2003    Left    TOTAL KNEE ARTHROPLASTY  2009    Right    UPPER GI ENDOSCOPY,BIOPSY  1/15/2018          Family History   Problem Relation Age of Onset    Cancer Mother      throat cancer    COPD Mother     Cancer Father kidney cancer    Other Father      neck fx and paraplegia    Hypertension Sister     Arthritis-osteo Sister     Stroke Child       Social History   Substance Use Topics    Smoking status: Former Smoker     Packs/day: 1.00     Years: 30.00     Quit date: 5/23/2004    Smokeless tobacco: Never Used    Alcohol use No      Comment: no alcohol for the pask 6 months         Current Outpatient Prescriptions:     amitriptyline (ELAVIL) 10 mg tablet, Take 1-2 Tabs by mouth nightly., Disp: 100 Tab, Rfl: 5    methIMAzole (TAPAZOLE) 10 mg tablet, Take 3 Tabs by mouth two (2) times a day. With food, Disp: 180 Tab, Rfl: 11    valsartan (DIOVAN) 80 mg tablet, Take 1 Tab by mouth daily. , Disp: 30 Tab, Rfl: 0    dilTIAZem CD (CARDIZEM CD) 180 mg ER capsule, Take 1 Cap by mouth daily. , Disp: 30 Cap, Rfl: 0    amiodarone (PACERONE) 400 mg tablet, Take 1 Tab by mouth daily. , Disp: 30 Tab, Rfl: 0    lansoprazole (PREVACID) 30 mg capsule, Take 1 Cap by mouth Daily (before breakfast) for 30 days. , Disp: 30 Cap, Rfl: 0    rivaroxaban (XARELTO) 20 mg tab tablet, Take 20 mg by mouth daily. , Disp: , Rfl:     metoprolol succinate (TOPROL-XL) 50 mg XL tablet, Take 50 mg by mouth daily. , Disp: , Rfl:         No Known Allergies     Review of Systems:  A comprehensive review of systems was negative except for: Constitutional: positive for fatigue, malaise and weight loss  Cardiovascular: positive for palpitations, irregular heart beats  Gastrointestinal: positive for dysphagia, odynophagia, reflux symptoms, vomiting and change in bowel habits  Musculoskeletal: positive for myalgias, arthralgias, stiff joints and muscle weakness  Neurological: positive for coordination problems, gait problems and weakness  Behvioral/Psych: positive for anxiety and depression   Vitals:    02/13/18 0816   BP: 124/76   Pulse: 75   Resp: 16   Temp: 98 °F (36.7 °C)   SpO2: 98%   Weight: 194 lb (88 kg)   Height: 6' 4\" (1.93 m)     Objective: I      NEUROLOGICAL EXAM:    Appearance: The patient is thinly developed and poorly nourished, provides a coherent history and is in no acute distress. Mental Status: Oriented to time, place and person, and the president, cognitive function is normal and speech is fluent and no aphasia or dysarthria. Mood and affect appropriate, but mildly depressed. Cranial Nerves:   Intact visual fields. Fundi are benign. LINDSEY, EOM's full, no nystagmus, no ptosis. Facial sensation is normal. Corneal reflexes are not tested. Facial movement is symmetric. Hearing is normal bilaterally. Palate is midline with normal sternocleidomastoid and trapezius muscles are normal. Tongue is midline without fasciculations or atrophy noted. .  Neck without meningismus or bruits  Temporal arteries are not tender or enlarged   Motor:  4/5 strength in upper and lower proximal and distal muscles, with more mild proximal weakness in both pelvic girdle musculature. Reduced bulk and normal tone. No fasciculations. Reflexes:   Deep tendon reflexes 1+/4 and symmetrical.  No babinski or clonus present   Sensory:   Normal to touch, pinprick and vibration and temperature slightly decreased in both feet. DSS is intact   Gait:  Normal gait, but he does move slowly. Tremor:   No tremor noted. Cerebellar:  No cerebellar signs present. Neurovascular:  Normal heart sounds and regular rhythm, peripheral pulses decreased, and no carotid bruits. Assessment:       ICD-10-CM ICD-9-CM    1. Oropharyngeal dysphagia R13.12 787.22 GLORIA COMPREHENSIVE PLUS PANEL      IMMUNOELECTROPHORESIS (IMMUNOFIX.)      VITAMIN D, 25 HYROXY PANEL      VITAMIN B12 & FOLATE      CK      MYASTHENIA GRAVIS EVALUATION      ANTINEURONAL CELL AB      SED RATE (ESR)      MAGNESIUM      EMG LIMITED      EMG NCV MOTOR WO F/WAVE PER NERVE      EMG NCV SENSORY PER NERVE      amitriptyline (ELAVIL) 10 mg tablet   2.  Weakness R53.1 780.79 GLORIA COMPREHENSIVE PLUS PANEL IMMUNOELECTROPHORESIS (IMMUNOFIX.)      VITAMIN D, 25 HYROXY PANEL      VITAMIN B12 & FOLATE      CK      MYASTHENIA GRAVIS EVALUATION      ANTINEURONAL CELL AB      SED RATE (ESR)      MAGNESIUM      EMG LIMITED      EMG NCV MOTOR WO F/WAVE PER NERVE      EMG NCV SENSORY PER NERVE      amitriptyline (ELAVIL) 10 mg tablet   3. Vitamin D deficiency E55.9 268.9 GLORIA COMPREHENSIVE PLUS PANEL      IMMUNOELECTROPHORESIS (IMMUNOFIX.)      VITAMIN D, 25 HYROXY PANEL      VITAMIN B12 & FOLATE      CK      MYASTHENIA GRAVIS EVALUATION      ANTINEURONAL CELL AB      SED RATE (ESR)      MAGNESIUM      EMG LIMITED      EMG NCV MOTOR WO F/WAVE PER NERVE      EMG NCV SENSORY PER NERVE      amitriptyline (ELAVIL) 10 mg tablet   4. Hyperthyroidism E05.90 242.90 GLORIA COMPREHENSIVE PLUS PANEL      IMMUNOELECTROPHORESIS (IMMUNOFIX.)      VITAMIN D, 25 HYROXY PANEL      VITAMIN B12 & FOLATE      CK      MYASTHENIA GRAVIS EVALUATION      ANTINEURONAL CELL AB      SED RATE (ESR)      MAGNESIUM      EMG LIMITED      EMG NCV MOTOR WO F/WAVE PER NERVE      EMG NCV SENSORY PER NERVE      amitriptyline (ELAVIL) 10 mg tablet   5. Metabolic myopathy I57.6 762.53 GLORIA COMPREHENSIVE PLUS PANEL    G73.7  IMMUNOELECTROPHORESIS (IMMUNOFIX.)      VITAMIN D, 25 HYROXY PANEL      VITAMIN B12 & FOLATE      CK      MYASTHENIA GRAVIS EVALUATION      ANTINEURONAL CELL AB      SED RATE (ESR)      MAGNESIUM      EMG LIMITED      EMG NCV MOTOR WO F/WAVE PER NERVE      EMG NCV SENSORY PER NERVE      amitriptyline (ELAVIL) 10 mg tablet   6. B12 deficiency E53.8 266.2 GLORIA COMPREHENSIVE PLUS PANEL      IMMUNOELECTROPHORESIS (IMMUNOFIX.)      VITAMIN D, 25 HYROXY PANEL      VITAMIN B12 & FOLATE      CK      MYASTHENIA GRAVIS EVALUATION      ANTINEURONAL CELL AB      SED RATE (ESR)      MAGNESIUM      EMG LIMITED      EMG NCV MOTOR WO F/WAVE PER NERVE      EMG NCV SENSORY PER NERVE      amitriptyline (ELAVIL) 10 mg tablet   7.  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 fib and weight loss and lack of appetite, however in view of his abnormal studies and continued symptoms we need to rule out other causes. We will check complete metabolic parameters ruling out neuromuscular disease, and check an EMG study to make sure he does not have motor neuron disease, but I do not see any clear evidence of that now on exam with fasciculations not really being noted anywhere. He does not seem to have any atrophy or fasciculations of his tongue. He has normal cranial nerve exam at this time. I do not think he has a primary brain lesion as a cause of his symptoms. We advised him to continue taking multivitamins and vitamin D in the interim, and we will give him some low-dose amitriptyline at his request to help him sleep and help stimulate his appetite, and may be treat any mild depression may have. He was advised of the side effect of medication as far as drowsiness sedation or any cardiac arrhythmia or dizziness or sleepiness to contact us immediately.   He is to continue to try to increase his p.o. intake, continue his exercise program, and call us if there is any sudden change or worsening of his condition or sudden change in his strength or progression of his weakness, but at this point he seems to be on a corrective course not a deteriorating course which is most likely suggesting this is the thyrotoxic myopathy  Follow-up to be arranged in 3 months time or earlier as needed, he will check my chart for results of his tests or call us, and we will decide on further treatment and evaluation depending on his clinical course and results of his tests  One hour for the patient, going over his records, reviewing his pathology, reviewing all his tests, reviewing all his lab tests, reviewing his x-rays, and discussing his diagnosis prognosis and treatment options with the patient in detail. Signed By: Shira Cardoza MD     February 13, 2018       CC: Severo Apley, MD  FAX: [phone]    This note will not be viewable in [address]

## 2018-02-13 NOTE — LETTER
2/13/2018 10:13 AM 
 
Patient:  Lolly Angela YOB: 1945 Date of Visit: 2/13/2018 Dear No Recipients: Thank you for referring Mr. Kulwinder Interiano to me for evaluation/treatment. Below are the relevant portions of my assessment and plan of care. If you have questions, please do not hesitate to call me. I look forward to following . Thierno Wilcoxlaura along with you. Sincerely, Danica Bowie MD

## 2018-02-14 ENCOUNTER — HOSPITAL ENCOUNTER (OUTPATIENT)
Dept: GENERAL RADIOLOGY | Age: 73
Discharge: HOME | End: 2018-02-14
Attending: SURGERY
Payer: MEDICARE

## 2018-02-14 DIAGNOSIS — R63.4 WEIGHT LOSS: ICD-10-CM

## 2018-02-14 DIAGNOSIS — R14.0 ABDOMINAL BLOATING: ICD-10-CM

## 2018-02-14 DIAGNOSIS — R11.0 NAUSEA: ICD-10-CM

## 2018-02-14 PROCEDURE — 74249 XR UPPER GI AIR CONT/SMALL BOWEL: CPT

## 2018-02-22 LAB
25(OH)D2 SERPL-MCNC: <1 NG/ML
25(OH)D3 SERPL-MCNC: 24 NG/ML
25(OH)D3+25(OH)D2 SERPL-MCNC: 24 NG/ML
ACHR BIND AB SER-SCNC: <0.03 NMOL/L (ref 0–0.24)
ANTI NEURONAL CELL AB, 811986: 7 UNITS (ref 0–54)
CENTROMERE B AB SER-ACNC: <0.2 AI (ref 0–0.9)
CHROMATIN AB SERPL-ACNC: <0.2 AI (ref 0–0.9)
CK SERPL-CCNC: 24 U/L (ref 24–204)
DSDNA AB SER-ACNC: <1 IU/ML (ref 0–9)
ENA JO1 AB SER-ACNC: <0.2 AI (ref 0–0.9)
ENA RNP AB SER-ACNC: 0.5 AI (ref 0–0.9)
ENA SCL70 AB SER-ACNC: <0.2 AI (ref 0–0.9)
ENA SM AB SER-ACNC: <0.2 AI (ref 0–0.9)
ENA SM+RNP AB SER-ACNC: <0.2 AI (ref 0–0.9)
ENA SS-A AB SER-ACNC: <0.2 AI (ref 0–0.9)
ENA SS-B AB SER-ACNC: <0.2 AI (ref 0–0.9)
ERYTHROCYTE [SEDIMENTATION RATE] IN BLOOD BY WESTERGREN METHOD: 6 MM/HR (ref 0–30)
FOLATE SERPL-MCNC: 9 NG/ML
IGA SERPL-MCNC: 297 MG/DL (ref 61–437)
IGG SERPL-MCNC: 995 MG/DL (ref 700–1600)
IGM SERPL-MCNC: 80 MG/DL (ref 15–143)
INTERPRETATION, 811987: NORMAL
MAGNESIUM SERPL-MCNC: 2 MG/DL (ref 1.6–2.3)
PROT PATTERN SERPL IFE-IMP: NORMAL
RIBOSOMAL P AB SER-ACNC: <0.2 AI (ref 0–0.9)
SEE BELOW:, 164879: NORMAL
STRIA MUS AB TITR SER IF: NEGATIVE {TITER}
VIT B12 SERPL-MCNC: 531 PG/ML (ref 232–1245)

## 2018-02-26 ENCOUNTER — HOSPITAL ENCOUNTER (OUTPATIENT)
Age: 73
Setting detail: OUTPATIENT SURGERY
Discharge: HOME | End: 2018-02-26
Attending: SPECIALIST | Admitting: SPECIALIST

## 2018-02-26 ENCOUNTER — HOSPITAL ENCOUNTER (INPATIENT)
Age: 73
LOS: 2 days | Discharge: HOME | DRG: 273 | End: 2018-02-28
Attending: INTERNAL MEDICINE | Admitting: INTERNAL MEDICINE
Payer: MEDICARE

## 2018-02-26 ENCOUNTER — APPOINTMENT (OUTPATIENT)
Dept: GENERAL RADIOLOGY | Age: 73
DRG: 273 | End: 2018-02-26
Attending: INTERNAL MEDICINE
Payer: MEDICARE

## 2018-02-26 PROBLEM — I48.19 ATRIAL FIBRILLATION, PERSISTENT (HCC): Status: ACTIVE | Noted: 2018-02-26

## 2018-02-26 LAB
ALBUMIN SERPL-MCNC: 2.4 G/DL (ref 3.5–5)
ALBUMIN/GLOB SERPL: 0.6 {RATIO} (ref 1.1–2.2)
ALP SERPL-CCNC: 187 U/L (ref 45–117)
ALT SERPL-CCNC: 38 U/L (ref 12–78)
ANION GAP SERPL CALC-SCNC: 11 MMOL/L (ref 5–15)
AST SERPL-CCNC: 41 U/L (ref 15–37)
BILIRUB SERPL-MCNC: 0.7 MG/DL (ref 0.2–1)
BUN SERPL-MCNC: 15 MG/DL (ref 6–20)
BUN/CREAT SERPL: 20 (ref 12–20)
CALCIUM SERPL-MCNC: 8.3 MG/DL (ref 8.5–10.1)
CHLORIDE SERPL-SCNC: 109 MMOL/L (ref 97–108)
CO2 SERPL-SCNC: 21 MMOL/L (ref 21–32)
CREAT SERPL-MCNC: 0.74 MG/DL (ref 0.7–1.3)
ERYTHROCYTE [DISTWIDTH] IN BLOOD BY AUTOMATED COUNT: 17.8 % (ref 11.5–14.5)
GLOBULIN SER CALC-MCNC: 3.7 G/DL (ref 2–4)
GLUCOSE SERPL-MCNC: 98 MG/DL (ref 65–100)
HCT VFR BLD AUTO: 37.4 % (ref 36.6–50.3)
HGB BLD-MCNC: 12.2 G/DL (ref 12.1–17)
MAGNESIUM SERPL-MCNC: 1.9 MG/DL (ref 1.6–2.4)
MCH RBC QN AUTO: 28.2 PG (ref 26–34)
MCHC RBC AUTO-ENTMCNC: 32.6 G/DL (ref 30–36.5)
MCV RBC AUTO: 86.4 FL (ref 80–99)
NRBC # BLD: 0 K/UL (ref 0–0.01)
NRBC BLD-RTO: 0 PER 100 WBC
PLATELET # BLD AUTO: 281 K/UL (ref 150–400)
PMV BLD AUTO: 12.3 FL (ref 8.9–12.9)
POTASSIUM SERPL-SCNC: 3.9 MMOL/L (ref 3.5–5.1)
PROT SERPL-MCNC: 6.1 G/DL (ref 6.4–8.2)
RBC # BLD AUTO: 4.33 M/UL (ref 4.1–5.7)
SODIUM SERPL-SCNC: 141 MMOL/L (ref 136–145)
TSH SERPL DL<=0.05 MIU/L-ACNC: <0.01 UIU/ML (ref 0.36–3.74)
WBC # BLD AUTO: 10.1 K/UL (ref 4.1–11.1)

## 2018-02-26 PROCEDURE — 80053 COMPREHEN METABOLIC PANEL: CPT | Performed by: INTERNAL MEDICINE

## 2018-02-26 PROCEDURE — 84443 ASSAY THYROID STIM HORMONE: CPT | Performed by: INTERNAL MEDICINE

## 2018-02-26 PROCEDURE — 93619 COMPREHENSIVE EP EVALUATION: CPT

## 2018-02-26 PROCEDURE — 74011250636 HC RX REV CODE- 250/636: Performed by: INTERNAL MEDICINE

## 2018-02-26 PROCEDURE — 85027 COMPLETE CBC AUTOMATED: CPT | Performed by: INTERNAL MEDICINE

## 2018-02-26 PROCEDURE — 83735 ASSAY OF MAGNESIUM: CPT | Performed by: INTERNAL MEDICINE

## 2018-02-26 PROCEDURE — 71045 X-RAY EXAM CHEST 1 VIEW: CPT

## 2018-02-26 PROCEDURE — 65660000000 HC RM CCU STEPDOWN

## 2018-02-26 PROCEDURE — 74011250637 HC RX REV CODE- 250/637: Performed by: INTERNAL MEDICINE

## 2018-02-26 RX ORDER — FUROSEMIDE 10 MG/ML
40 INJECTION INTRAMUSCULAR; INTRAVENOUS 2 TIMES DAILY
Status: DISCONTINUED | OUTPATIENT
Start: 2018-02-26 | End: 2018-02-28 | Stop reason: HOSPADM

## 2018-02-26 RX ORDER — METOPROLOL SUCCINATE 25 MG/1
25 TABLET, EXTENDED RELEASE ORAL DAILY
Status: DISCONTINUED | OUTPATIENT
Start: 2018-02-27 | End: 2018-02-26

## 2018-02-26 RX ORDER — METHIMAZOLE 5 MG/1
30 TABLET ORAL 2 TIMES DAILY
Status: DISCONTINUED | OUTPATIENT
Start: 2018-02-26 | End: 2018-02-28 | Stop reason: HOSPADM

## 2018-02-26 RX ORDER — LIDOCAINE HYDROCHLORIDE 20 MG/ML
JELLY TOPICAL ONCE
Status: DISCONTINUED | OUTPATIENT
Start: 2018-02-26 | End: 2018-02-26 | Stop reason: HOSPADM

## 2018-02-26 RX ORDER — AMIODARONE HYDROCHLORIDE 200 MG/1
400 TABLET ORAL DAILY
Status: DISCONTINUED | OUTPATIENT
Start: 2018-02-27 | End: 2018-02-26

## 2018-02-26 RX ORDER — SODIUM CHLORIDE 0.9 % (FLUSH) 0.9 %
5-10 SYRINGE (ML) INJECTION EVERY 8 HOURS
Status: DISCONTINUED | OUTPATIENT
Start: 2018-02-26 | End: 2018-02-28 | Stop reason: HOSPADM

## 2018-02-26 RX ORDER — AMITRIPTYLINE HYDROCHLORIDE 10 MG/1
10-20 TABLET, FILM COATED ORAL
Status: DISCONTINUED | OUTPATIENT
Start: 2018-02-26 | End: 2018-02-28 | Stop reason: HOSPADM

## 2018-02-26 RX ORDER — ACETAMINOPHEN 325 MG/1
650 TABLET ORAL
Status: DISCONTINUED | OUTPATIENT
Start: 2018-02-26 | End: 2018-02-28 | Stop reason: HOSPADM

## 2018-02-26 RX ORDER — METOPROLOL SUCCINATE 50 MG/1
50 TABLET, EXTENDED RELEASE ORAL DAILY
Status: DISCONTINUED | OUTPATIENT
Start: 2018-02-27 | End: 2018-02-28 | Stop reason: HOSPADM

## 2018-02-26 RX ORDER — VALSARTAN 80 MG/1
80 TABLET ORAL DAILY
Status: DISCONTINUED | OUTPATIENT
Start: 2018-02-27 | End: 2018-02-28 | Stop reason: HOSPADM

## 2018-02-26 RX ORDER — SODIUM CHLORIDE 0.9 % (FLUSH) 0.9 %
5-10 SYRINGE (ML) INJECTION AS NEEDED
Status: DISCONTINUED | OUTPATIENT
Start: 2018-02-26 | End: 2018-02-28 | Stop reason: HOSPADM

## 2018-02-26 RX ORDER — DILTIAZEM HYDROCHLORIDE 180 MG/1
180 CAPSULE, COATED, EXTENDED RELEASE ORAL DAILY
Status: DISCONTINUED | OUTPATIENT
Start: 2018-02-27 | End: 2018-02-26

## 2018-02-26 RX ADMIN — Medication 10 ML: at 22:04

## 2018-02-26 RX ADMIN — Medication 10 ML: at 19:29

## 2018-02-26 RX ADMIN — METHIMAZOLE 30 MG: 5 TABLET ORAL at 19:26

## 2018-02-26 RX ADMIN — FUROSEMIDE 40 MG: 10 INJECTION, SOLUTION INTRAMUSCULAR; INTRAVENOUS at 19:29

## 2018-02-26 NOTE — PROGRESS NOTES
Patient was very short of breath walking back for procedure, requiring a stop and then a wheelchair to bring him to procedure room. /94, heart rate 136-98, R-18. Michel Curtis He explained he was going for an echo this Friday, and seeing a endocrinologist for hyperthyroid condition. He appeared short winded while sitting. advised to continue with cardiac workup before we could complete his esophageal manometry. Dr Gianna Nash notified.

## 2018-02-26 NOTE — PROGRESS NOTES
Bedside shift change report given to murtaza  (oncoming nurse) by Glorine Sacks  (offgoing nurse). Report included the following information SBAR. SHIFT SUMMARY: patient admitted. Dietary order placed. Patient continues on telemetry   5:45 pm Dr. Lua Held to come in to see patient to follow up on cardiac rhythm and orders  r    [address] [place] [address] NOTE   Admission Date 2/26/2018   Admission Diagnosis CHF   Consults None      Cardiac Monitoring [] Yes [] No      Purposeful Hourly Rounding [] Yes    Darius Score Total Score: 1   Darius score 3 or > [] Bed Alarm [] Avasys [] 1:1 sitter [] Patient refused (Place signed refusal form in chart)   Tr Score Tr Score: 18   Tr score 14 or < [] PMT consult [] Wound Care consult    []  Specialty bed  [] Nutrition consult      Influenza Vaccine Received Flu Vaccine for Current Season (usually Sept-March): Yes           Oxygen needs? [] Room air Oxygen @  []1L    []2L    []3L   []4L    []5L   []6L     Use home O2? [] Yes [] No  Perform O2 challenge test using  smartphrase (.Homeoxygen)      Last bowel movement Last Bowel Movement Date: 02/26/18      Urinary Catheter             LDAs               Peripheral IV 02/26/18 Right Wrist (Active)   Site Assessment Clean, dry, & intact 2/26/2018  4:37 PM   Phlebitis Assessment 0 2/26/2018  4:37 PM   Infiltration Assessment 0 2/26/2018  4:37 PM   Dressing Status Clean, dry, & intact 2/26/2018  4:37 PM   Dressing Type Tape;Transparent 2/26/2018  4:37 PM   Hub Color/Line Status Pink;Flushed;Capped 2/26/2018  4:37 PM   Action Taken Blood drawn; Other (comment) 2/26/2018  4:37 PM   Alcohol Cap Used Yes 2/26/2018  4:37 PM                         Readmission Risk Assessment Tool Score Medium Risk            18       Total Score        3 Has Seen PCP in Last 6 Months (Yes=3, No=0)    2 . Living with Significant Other. Assisted Living. LTAC. SNF. or   Rehab    4 IP Visits Last 12 Months (1-3=4, 4=9, >4=11)    5 Pt.  Coverage (Medicare=5 , Medicaid, or Self-Pay=4)    4 Charlson Comorbidity Score (Age + Comorbid Conditions)        Criteria that do not apply:    Patient Length of Stay (>5 days = 3)       Expected Length of Stay - - -   Actual Length of Stay 0

## 2018-02-26 NOTE — PROGRESS NOTES
Patient arrived on unit for admission.  Patient appears to have pacer spikes within his QRS , patient sob

## 2018-02-26 NOTE — IP AVS SNAPSHOT
[address] 
[phone] Patient: Nick Centeno MRN: [id number] FMD:[id number]/11/[id number] About your hospitalization You were admitted on:  February 26, 2018 You last received care in the:  [facility] 2 INTRVNTNL CARDIO You were discharged on:  February 28, 2018 Why you were hospitalized Your primary diagnosis was:  Not on File Your diagnoses also included:  Atrial Fibrillation, Persistent (Hcc) Follow-up Information Follow up With Details Comments Contact Info Radha Eisenberg MD In 5 days Appointment scheduled for March 5th at [address] [facility] [address] [address] [phone] Luis Dean MD Go on 3/8/2018 For scheduled appointment at 2:10PM  [address] [facility] [address]. 
[phone] Dale Shelton MD Go on 3/7/2018 For hospital follow up appointment at 11:15AM  [address]. 
[phone] [address] to Home will check on you when discharged. They will call prior to coming out [phone] Your Scheduled Appointments Thursday March 08, 2018  2:10 PM EST Follow Up with Luis Dean MD  
[facility]) One [facility] [address] [address] [phone] Wednesday March 21, 2018  1:00 PM EDT PROCEDURE with Libertad Cowart MD  
[facility] at [facility]) [address], [address]  
[phone] Discharge Orders None A check jose a indicates which time of day the medication should be taken. My Medications START taking these medications Instructions Each Dose to Equal  
 Morning Noon Evening Bedtime  
 furosemide 40 mg tablet Commonly known as:  LASIX Your last dose was: Your next dose is: Take 1 Tab by mouth daily. 40 mg CHANGE how you take these medications Instructions Each Dose to Equal  
 Morning Noon Evening Bedtime  
 metoprolol succinate 50 mg XL tablet Commonly known as:  TOPROL-XL What changed:  how much to take Your last dose was: Your next dose is: Take 1 Tab by mouth daily. 50 mg CONTINUE taking these medications Instructions Each Dose to Equal  
 Morning Noon Evening Bedtime  
 amitriptyline 10 mg tablet Commonly known as:  ELAVIL Your last dose was: Your next dose is: Take 1-2 Tabs by mouth nightly. 10-20 mg  
    
   
   
   
  
 methIMAzole 10 mg tablet Commonly known as:  TAPAZOLE Your last dose was: Your next dose is: Take 3 Tabs by mouth two (2) times a day. With food 30 mg  
    
   
   
   
  
 valsartan 80 mg tablet Commonly known as:  DIOVAN Your last dose was: Your next dose is: Take 1 Tab by mouth daily. 80 mg XARELTO 20 mg Tab tablet Generic drug:  rivaroxaban Your last dose was: Your next dose is: Take 20 mg by mouth daily. 20 mg  
    
   
   
   
  
  
STOP taking these medications   
 amiodarone 400 mg tablet Commonly known as:  PACERONE  
   
  
 dilTIAZem  mg ER capsule Commonly known as:  CARDIZEM CD Where to Get Your Medications Information on where to get these meds will be given to you by the nurse or doctor. ! Ask your nurse or doctor about these medications  
  furosemide 40 mg tablet  
 metoprolol succinate 50 mg XL tablet Discharge Instructions Weigh Daily  Keep Log/Record Notify Dr. Jina Zhu for a weight gain of 3 pounds or greater in one day or 5 pounds in one week. POST-EP STUDY AND/OR ABLATION DISCHARGE INSTRUCTIONS: 
 
You had an AV node ablation by Dr. Guerrero Nathan on 2/27. This was to definitely get control of your heart rates during atrial fibrillation. From here forward, the pacing function in your device should direct the heart rate. 
 
--STOP AMIODARONE and DILTIAZEM. --INCREASE THE METOPROLOL TO 50 MG DAILY. --START FUROSEMIDE 40 MG DAILY. Follow-up with our nurse practitioner, Josefina Cheng, regarding progress with your heart failure within 2 weeks. Do not drive, operate any machinery, or sign any legal documents for 24 hours after your procedure. You must have someone to drive you home. You may take a shower 24 hours after your cardiac procedure. Be sure to get the dressing wet and then remove it; gently wash the area with warm soapy water. Pat dry and leave open to air. To help prevent infections, be sure to keep the cath site clean and dry. No lotions, creams, powders, ointments, etc. in the cath site for approximately 1 week. ? Do not take a tub bath, get in a hot tub or swimming pool for approximately 5 days or until the cath site is completely healed. ? No strenuous activity or heavy lifting over 20 lbs. for 7 days. ? After your procedure, some bruising or discomfort is common during the healing process. Tylenol, 1-2 tablets every 6 hours as needed, is recommended if you experience any discomfort. If you experience any signs or symptoms of infection such as fever, chills, or poorly healing incision, persistent tenderness or swelling in the groin, redness and/or warmth to the touch, numbness, significant tingling or pain at the groin site or affected extremity, rash, drainage from the site, or if the leg feels tight or swollen, call your physician right away. ? If bleeding at the site occurs, take a clean gauze pad and apply direct pressure to the groin just above the puncture site, and call your physician right away. ? If your procedure involved ablation therapy, you may feel some mild or vague chest discomfort due to delivery of heat therapy to the heart muscle. This should resolve in 1-2 days. If it gets worse or is associated with shortness of breath, dizziness, loss of consciousness, call your physician right away or call 911 if emergency medical care is needed. Signed: 
Denisse Sauceda MD 
 
  
  
  
[employer] Announcement We are excited to announce that we are making your provider's discharge notes available to you in [employer]. You will see these notes when they are completed and signed by the physician that discharged you from your recent hospital stay. If you have any questions or concerns about any information you see in [employer], please call the Health Information Department where you were seen or reach out to your Primary Care Provider for more information about your plan of care. Introducing [address]! Dear Tonya Go: Thank you for requesting a [employer] account. Our records indicate that you already have an active [employer] account. You can access your account anytime at [email or web address] Did you know that you can access your hospital and ER discharge instructions at any time in [employer]? You can also review all of your test results from your hospital stay or ER visit. Additional Information If you have questions, please visit the Frequently Asked Questions section of the [employer] website at [email or web address] Remember, [employer] is NOT to be used for urgent needs. For medical emergencies, dial 911. Now available from your iPhone and Android! Providers Seen During Your Hospitalization Provider Specialty Primary office phone Sarah Tamayo MD Cardiology [phone] Your Primary Care Physician (PCP) Primary Care Physician Office Phone Office Fax Mercedes Santiago, [address] [phone] You are allergic to the following No active allergies Recent Documentation Weight BMI Smoking Status 81.5 kg 21.87 kg/m2 Former Smoker Emergency Contacts Name Discharge Info Relation Home Work Mobile Mary Anne Hugo DISCHARGE CAREGIVER [3] Spouse [3] [phone] Patient Belongings The following personal items are in your possession at time of discharge: 
  Dental Appliances: Uppers, Lowers  Visual Aid: Glasses, With patient      Home Medications: None   Jewelry: None  Clothing: Pants, Shirt, Undergarments, Jacket/Coat (shoes)    Other Valuables: Cell Phone Discharge Instructions Attachments/References HEART FAILURE ZONES: GENERAL INFO (ENGLISH) Patient Handouts Learning About Heart Failure Zones What are heart failure zones? Heart failure zones give you an easy way to see changes in your heart failure symptoms. They also tell you when you need to get help. Check every day to see which zone you are in. Green zone. You are doing well. This is where you want to be. · Your weight is stable. This means it is not going up or down. · You breathe easily. · You are sleeping well. You are able to lie flat without shortness of breath. · You can do your usual activities. Yellow zone. Be careful. Your symptoms are changing. Call your doctor. · You have new or increased shortness of breath. · You are dizzy or lightheaded, or you feel like you may faint. · You have sudden weight gain, such as more than 2 to 3 pounds in a day or 5 pounds in a week. (Your doctor may suggest a different range of weight gain.) · You have increased swelling in your legs, ankles, or feet. · You are so tired or weak that you cannot do your usual activities. · You are not sleeping well. Shortness of breath wakes you up at night. You need extra pillows. Your doctor's name: ____________________________________________________________ Your doctor's contact information: _________________________________________________ Red zone. This is an emergency. Call 911. You have symptoms of sudden heart failure, such as: 
· You have severe trouble breathing. · You cough up pink, foamy mucus. · You have a new irregular or fast heartbeat. You have symptoms of a heart attack. These may include: · Chest pain or pressure, or a strange feeling in the chest. 
· Sweating. · Shortness of breath. · Nausea or vomiting. · Pain, pressure, or a strange feeling in the back, neck, jaw, or upper belly or in one or both shoulders or arms. · Lightheadedness or sudden weakness. · A fast or irregular heartbeat. If you have symptoms of a heart attack: After you call 911, the  may tell you to chew 1 adult-strength or 2 to 4 low-dose aspirin. Wait for an ambulance. Do not try to drive yourself. Follow-up care is a key part of your treatment and safety. Be sure to make and go to all appointments, and call your doctor if you are having problems. It's also a good idea to know your test results and keep a list of the medicines you take. Where can you learn more? Go to http://tavon-zina.info/. Enter T174 in the search box to learn more about \"Learning About Heart Failure Zones. \" Current as of: September 21, 2016 Content Version: 11.4 © 7783-9438 Healthwise, Incorporated. Care instructions adapted under license by [employer] (which disclaims liability or warranty for this information). If you have questions about a medical condition or this instruction, always ask your healthcare professional. [address] any warranty or liability for your use of this information. Please provide this summary of care documentation to your next provider. Signatures-by signing, you are acknowledging that this After Visit Summary has been reviewed with you and you have received a copy. Patient Signature:  ____________________________________________________________ Date:  ____________________________________________________________  
  
Devota Dandy Provider Signature:  ____________________________________________________________ Date:  ____________________________________________________________

## 2018-02-27 PROCEDURE — 65660000000 HC RM CCU STEPDOWN

## 2018-02-27 PROCEDURE — 93650 ICAR CATH ABLTJ AV NODE FUNC: CPT

## 2018-02-27 PROCEDURE — 74011250636 HC RX REV CODE- 250/636

## 2018-02-27 PROCEDURE — 4A0234Z MEASUREMENT OF CARDIAC ELECTRICAL ACTIVITY, PERCUTANEOUS APPROACH: ICD-10-PCS | Performed by: INTERNAL MEDICINE

## 2018-02-27 PROCEDURE — 74011250637 HC RX REV CODE- 250/637: Performed by: INTERNAL MEDICINE

## 2018-02-27 PROCEDURE — C1733 CATH, EP, OTHR THAN COOL-TIP: HCPCS

## 2018-02-27 PROCEDURE — 74011000250 HC RX REV CODE- 250

## 2018-02-27 PROCEDURE — 77030029065 HC DRSG HEMO QCLOT ZMED -B

## 2018-02-27 PROCEDURE — 77030011640 HC PAD GRND REM COVD -A

## 2018-02-27 PROCEDURE — 74011250636 HC RX REV CODE- 250/636: Performed by: INTERNAL MEDICINE

## 2018-02-27 PROCEDURE — 77030018729 HC ELECTRD DEFIB PAD CARD -B

## 2018-02-27 PROCEDURE — 02583ZZ DESTRUCTION OF CONDUCTION MECHANISM, PERCUTANEOUS APPROACH: ICD-10-PCS | Performed by: INTERNAL MEDICINE

## 2018-02-27 PROCEDURE — C1894 INTRO/SHEATH, NON-LASER: HCPCS

## 2018-02-27 PROCEDURE — 02K83ZZ MAP CONDUCTION MECHANISM, PERCUTANEOUS APPROACH: ICD-10-PCS | Performed by: INTERNAL MEDICINE

## 2018-02-27 RX ORDER — LIDOCAINE HYDROCHLORIDE 10 MG/ML
INJECTION INFILTRATION; PERINEURAL
Status: COMPLETED
Start: 2018-02-27 | End: 2018-02-27

## 2018-02-27 RX ORDER — DOBUTAMINE HYDROCHLORIDE 200 MG/100ML
INJECTION INTRAVENOUS
Status: DISCONTINUED
Start: 2018-02-27 | End: 2018-02-28 | Stop reason: HOSPADM

## 2018-02-27 RX ORDER — ACETAMINOPHEN 325 MG/1
650 TABLET ORAL
Status: DISCONTINUED | OUTPATIENT
Start: 2018-02-27 | End: 2018-02-28 | Stop reason: HOSPADM

## 2018-02-27 RX ORDER — SODIUM CHLORIDE 0.9 % (FLUSH) 0.9 %
5-10 SYRINGE (ML) INJECTION AS NEEDED
Status: DISCONTINUED | OUTPATIENT
Start: 2018-02-27 | End: 2018-02-28 | Stop reason: HOSPADM

## 2018-02-27 RX ORDER — MIDAZOLAM HYDROCHLORIDE 1 MG/ML
1-5 INJECTION, SOLUTION INTRAMUSCULAR; INTRAVENOUS
Status: DISCONTINUED | OUTPATIENT
Start: 2018-02-27 | End: 2018-02-27 | Stop reason: HOSPADM

## 2018-02-27 RX ORDER — HEPARIN SODIUM 200 [USP'U]/100ML
INJECTION, SOLUTION INTRAVENOUS
Status: COMPLETED
Start: 2018-02-27 | End: 2018-02-27

## 2018-02-27 RX ORDER — MIDAZOLAM HYDROCHLORIDE 1 MG/ML
INJECTION, SOLUTION INTRAMUSCULAR; INTRAVENOUS
Status: COMPLETED
Start: 2018-02-27 | End: 2018-02-27

## 2018-02-27 RX ORDER — HEPARIN SODIUM 200 [USP'U]/100ML
500 INJECTION, SOLUTION INTRAVENOUS ONCE
Status: COMPLETED | OUTPATIENT
Start: 2018-02-27 | End: 2018-02-27

## 2018-02-27 RX ORDER — DOBUTAMINE HYDROCHLORIDE 200 MG/100ML
0-10 INJECTION INTRAVENOUS
Status: DISCONTINUED | OUTPATIENT
Start: 2018-02-27 | End: 2018-02-27 | Stop reason: HOSPADM

## 2018-02-27 RX ORDER — LIDOCAINE HYDROCHLORIDE 10 MG/ML
1-40 INJECTION INFILTRATION; PERINEURAL
Status: DISCONTINUED | OUTPATIENT
Start: 2018-02-27 | End: 2018-02-27 | Stop reason: HOSPADM

## 2018-02-27 RX ORDER — SODIUM CHLORIDE 0.9 % (FLUSH) 0.9 %
5-10 SYRINGE (ML) INJECTION EVERY 8 HOURS
Status: DISCONTINUED | OUTPATIENT
Start: 2018-02-27 | End: 2018-02-28 | Stop reason: HOSPADM

## 2018-02-27 RX ORDER — FENTANYL CITRATE 50 UG/ML
12.5-5 INJECTION, SOLUTION INTRAMUSCULAR; INTRAVENOUS
Status: DISCONTINUED | OUTPATIENT
Start: 2018-02-27 | End: 2018-02-27 | Stop reason: HOSPADM

## 2018-02-27 RX ORDER — FENTANYL CITRATE 50 UG/ML
INJECTION, SOLUTION INTRAMUSCULAR; INTRAVENOUS
Status: COMPLETED
Start: 2018-02-27 | End: 2018-02-27

## 2018-02-27 RX ADMIN — FENTANYL CITRATE 50 MCG: 50 INJECTION, SOLUTION INTRAMUSCULAR; INTRAVENOUS at 13:33

## 2018-02-27 RX ADMIN — LIDOCAINE HYDROCHLORIDE 10 ML: 10 INJECTION, SOLUTION INFILTRATION; PERINEURAL at 13:41

## 2018-02-27 RX ADMIN — Medication 10 ML: at 22:17

## 2018-02-27 RX ADMIN — AMITRIPTYLINE HYDROCHLORIDE 20 MG: 10 TABLET, FILM COATED ORAL at 22:16

## 2018-02-27 RX ADMIN — HEPARIN SODIUM 1000 UNITS: 200 INJECTION, SOLUTION INTRAVENOUS at 13:33

## 2018-02-27 RX ADMIN — MIDAZOLAM HYDROCHLORIDE 1 MG: 1 INJECTION, SOLUTION INTRAMUSCULAR; INTRAVENOUS at 13:37

## 2018-02-27 RX ADMIN — LIDOCAINE HYDROCHLORIDE 10 ML: 10 INJECTION INFILTRATION; PERINEURAL at 13:41

## 2018-02-27 RX ADMIN — FUROSEMIDE 40 MG: 10 INJECTION, SOLUTION INTRAMUSCULAR; INTRAVENOUS at 09:10

## 2018-02-27 RX ADMIN — METHIMAZOLE 30 MG: 5 TABLET ORAL at 18:08

## 2018-02-27 RX ADMIN — FUROSEMIDE 40 MG: 10 INJECTION, SOLUTION INTRAMUSCULAR; INTRAVENOUS at 18:08

## 2018-02-27 RX ADMIN — MIDAZOLAM HYDROCHLORIDE 2 MG: 1 INJECTION INTRAMUSCULAR; INTRAVENOUS at 13:32

## 2018-02-27 RX ADMIN — FENTANYL CITRATE 50 MCG: 50 INJECTION, SOLUTION INTRAMUSCULAR; INTRAVENOUS at 13:37

## 2018-02-27 RX ADMIN — MIDAZOLAM HYDROCHLORIDE 2 MG: 1 INJECTION, SOLUTION INTRAMUSCULAR; INTRAVENOUS at 13:45

## 2018-02-27 RX ADMIN — METOPROLOL SUCCINATE 50 MG: 50 TABLET, EXTENDED RELEASE ORAL at 09:09

## 2018-02-27 RX ADMIN — VALSARTAN 80 MG: 80 TABLET ORAL at 09:10

## 2018-02-27 RX ADMIN — Medication 5 ML: at 14:32

## 2018-02-27 RX ADMIN — METHIMAZOLE 30 MG: 5 TABLET ORAL at 09:10

## 2018-02-27 RX ADMIN — Medication 10 ML: at 06:21

## 2018-02-27 RX ADMIN — Medication 10 ML: at 22:16

## 2018-02-27 RX ADMIN — MIDAZOLAM HYDROCHLORIDE 2 MG: 1 INJECTION, SOLUTION INTRAMUSCULAR; INTRAVENOUS at 13:32

## 2018-02-27 NOTE — PROGRESS NOTES
1800: OOB to bathroom. Voided 300mL. Walked in halls 50+ yards no complaints    Bedside and Verbal shift change report given to Gracy (oncoming nurse) by Jerry Garcia (offgoing nurse). Report included the following information SBAR, Kardex, Intake/Output, MAR, Accordion and Recent Results.

## 2018-02-27 NOTE — H&P
EP/ ARRHYTHMIA Admission Note    Patient ID:  Patient: oYu Bergman  MRN: [id number]  Age: 71 y.o.  : 1945    Date of  Admission: 2018  3:28 PM   PCP:  Trayc Sanz MD    ________________________________________________________________________                           Assessment:                 1. Persistent Afib with RVR, possibly a contributing factor to heart failure. 2. Acute on chronic systolic CHF. 3. Hyperthyroidism, due to amiodarone? Likely driving the HR. Seen by Dr. Chris Hill who is managing his methimazole. 4. Stress  with no ischemia. 5. Hx of dilated nonischemic cardiomyopathy with EF 30% s/p BIV-ICD and then EF normalized, now ~20% by echo . 6. Pleural effusion and trivial pericardial effusion noted on echo . 7. Hypertensive heart disease with heart failure and CKD. 8. CKD stage 2.  9. S/p bilateral Knee replacement, hx of prosthesis infection. 8. , helps care for son with CVA, mild functional capacity.                              Plan:                     1. Hold tomorrow's Xarelto dose. 2. Continue metoprolol ER but increase to 50 daily. 3. Stop diltiazem CD. 4. Cont losartan (valsartan here). 5. Start furosemide 40 IV bid. Had some pleural fluid noted on echo, not seen well by CXR. 6. Continue methimazole for hyperthyroidism. Stop amiodarone regardless of whether it is causative or not for the issue. 7. 2g Na diet. 8. SJM BIV-ICD interrogated by me at the bedside and this showed ~50% triggered LV pacing and overall HR's in the 's range predominantly, with some higher. (See report to be scanned to the chart.)  Goal for 100% BIV pacing and total rate control. I discussed the potential benefits, risks, and alternatives to AV node ablation with him and he agrees to proceed. NPO after MN with the hope of doing this tomorrow.   All questions answered.          [x]        High complexity decision making was performed         Subjective:     CHIEF COMPLAINT: Dyspnea on exertion, severe     HISTORY OF PRESENT ILLNESS:     This is a male with a history of a remote BIV-ICD implant with restoration of LV systolic function with BIV pacing and medical therapy. He came to the clinic today for an echo and was noted to be very dyspneic on exertion. Almost dyspneic at rest.  Looked very fatigued just on walking across the room. An echo showed that his EF is about 20%, a decline from the last echo. Of note, his rate control medication has been adjusted. On top of this, his methimazole was increased due to persistent hyperthyroidism. He is still on amiodarone by the record (?), so this was stopped today. No syncope, dizziness, some palpitations. +orthopnea, PND, but no more edema. No chest pain, claudication. No stroke or TIA symptoms. No bleeding issues on Xarelto.        REVIEW OF SYSTEMS:     []         Unable to obtain  ROS due to ---   [x]         Total of 12 systems reviewed as follows:     Total of 12 systems reviewed as follows:                                                                      POSITIVE= Bold text  Negative = normal text  General:                                         fever, chills, sweats, generalized weakness, weight loss/gain,                                                                     loss of appetite   Eyes:                                                         blurred vision, eye pain, loss of vision, double vision  ENT:                                                          rhinorrhea, pharyngitis   Respiratory:                                   cough, sputum production, SOB, GARCIA, wheezing, pleuritic pain   Cardiology:                                    chest pain, palpitations, orthopnea, PND, edema, syncope   Gastrointestinal:                 abdominal pain , N/V, diarrhea, dysphagia, constipation, bleeding   Genitourinary:                     frequency, urgency, dysuria, hematuria, incontinence   Muskuloskeletal :                arthralgia, myalgia, back pain  Hematology:                                  easy bruising, nose or gum bleeding, lymphadenopathy   Dermatological:                   rash, ulceration, pruritis, color change / jaundice  Endocrine:                                     hot flashes or polydipsia   Neurological:                       headache, dizziness, confusion, focal weakness, paresthesia,                                                                    Speech difficulties, memory loss, gait difficulty  Psychological:                    Feelings of anxiety, depression, agitation     Objective:       Physical Exam:     Patient Vitals for the past 12 hrs:   Temp Pulse Resp BP SpO2   02/26/18 2010 97.6 °F (36.4 °C) 83 18 113/76 100 %   02/26/18 1553 97.8 °F (36.6 °C) 62 18 (!) 114/98 100 %       General Appearance: Well developed, cachectic, alert & oriented x 3,                         no acute distress. Ears/Nose/Mouth/Throat: Pupils equal and round, Hearing grossly normal.  Neck: Supple. JVP within normal limits. Carotids good upstrokes, with no bruit. Chest: Lungs clear to auscultation bilaterally, unlabored, symmetric air movement. Cardiovascular: Irregular tachycardic rate and rhythm, S1S2 normal, no murmur, rubs, gallops. Abdomen: Soft, non-tender, bowel sounds are active. No organomegaly. Extremities: Trace edema bilaterally. Radial pulses +2, Distal Pulses +1. No cyanosis or clubbing. Skin: Warm and dry. No jaundice, petechiae, or purpura. L chest BIV-ICD site without erythema, drainage, or erosion. Neuro: CN II-XII grossly intact, Strength and sensation grossly intact. Musculoskeletal:  Normal bulk and tone.       Data:               LAB Review:     No results for input(s): CPK, CKMB, CKNDX, TROIQ in the last 72 hours.     No lab exists for component: CPKMB  No results found for: CHOL, CHOLX, CHLST, CHOLV, HDL, LDL, LDLC, DLDLP, TGLX, TRIGL, TRIGP, CHHD, CHHDX  No results for input(s): INR, PTP, APTT in the last 72 hours. No lab exists for component: INREXT   Recent Labs      02/26/18   1645   NA  141   K  3.9   CL  109*   CO2  21   BUN  15   CREA  0.74   GLU  98   CA  8.3*   ALB  2.4*   WBC  10.1   HGB  12.2   HCT  37.4   PLT  281     Recent Labs      02/26/18   1645   SGOT  41*   AP  187*   TP  6.1*   ALB  2.4*   GLOB  3.7     No components found for: GLPOC  No results for input(s): PH, PCO2, PO2 in the last 72 hours.     Medications Reviewed:   No Known Allergies     Current Facility-Administered Medications   Medication Dose Route Frequency    [START ON 2/27/2018] valsartan (DIOVAN) tablet 80 mg  80 mg Oral DAILY    methIMAzole (TAPAZOLE) tablet 30 mg  30 mg Oral BID    amitriptyline (ELAVIL) tablet 10-20 mg  10-20 mg Oral QHS    sodium chloride (NS) flush 5-10 mL  5-10 mL IntraVENous Q8H    sodium chloride (NS) flush 5-10 mL  5-10 mL IntraVENous PRN    acetaminophen (TYLENOL) tablet 650 mg  650 mg Oral Q4H PRN    furosemide (LASIX) injection 40 mg  40 mg IntraVENous BID    [START ON 2/27/2018] metoprolol succinate (TOPROL-XL) XL tablet 50 mg  50 mg Oral DAILY          Gregory Krishna MD  2/26/2018

## 2018-02-27 NOTE — PROGRESS NOTES
1225: pts consent signed and in chart. Pt has been NPO since midnight, xarelto has been held, and pt has no questions concerns about procedure.      1315: Pt off floor for ablation procedure

## 2018-02-27 NOTE — PROGRESS NOTES
7:30 PM Bedside report received from MANUEL Marin.    10:00 PM Called Dr. Alicia Carson to clarify new order for [facility]. Patient's BP has been trending down. Per Dr. Alicia Carson, [facility] to give increased dose. See MAR. Will f/u and continue to monitor.

## 2018-02-27 NOTE — PROGRESS NOTES
Cardiopulmonary Care Interdisciplinary rounds were held today to discuss patient plan of care and outcomes. The following members were present: Pharmacy, Nursing, and Case Management.     Expected Length of Stay:  4d 12h  Geometric Length of Stay: DRG GLOS: 4.5    Plan of Care: Continue current treatment plan

## 2018-02-27 NOTE — PROGRESS NOTES
Problem: Falls - Risk of  Goal: *Absence of Falls  Document Darius Fall Risk and appropriate interventions in the flowsheet.    Outcome: Progressing Towards Goal  Fall Risk Interventions:  Mobility Interventions: Patient to call before getting OOB         Medication Interventions: Assess postural VS orthostatic hypotension    Elimination Interventions: Bed/chair exit alarm

## 2018-02-27 NOTE — PROGRESS NOTES
0700: Bedside shift change report given to Magdaleno Nava RN (oncoming nurse) by Tino Maguire RN (offgoing nurse). Report included the following information SBAR, Kardex, ED Summary, Intake/Output and MAR.

## 2018-02-27 NOTE — PROGRESS NOTES
Primary Nurse Jessica Hall RN and Mai Obrien,  performed a dual skin assessment on this patient Impairment noted- see wound doc flow sheet  Tr score is 18 patient has abraision right buttocks which slowly blanches partially. Right elbow slow to blessing.  Foam applied to both sites

## 2018-02-27 NOTE — PROGRESS NOTES
Pt is a 70 yo  male admitted on 2/26/18 for CHF, atrial fibrillation, persistent. Pt lives in a [facility] ([address]) with spouse Deacon Cai). Pt is independent in ADLs/IADLs to include driving. Pt has had [facility] and was recently discharged from [facility] last week. Pt has no history of SNF or acute inpatient rehab. Pt has a RW, straight cane, and \"everything I need\" at home. Pt to dc home by private vehicle with spouse. Pt to transport self or use family support for follow-up care appointments. Pt's preferred Rx is [facility] ([address]). CM met with pt to verify demographic info and complete initial assessment, dc planning. Pt is alert and oriented x 4. Pt sees Dr. Jules Burris (PCP) and Dr. Julia Rosenberg (Cardiology) outpatient. Pt reports he had gone to see Dr. Wes Jimenez yesterday for a GI procedure, but went into A-fib and was admitted. Pt will undergo Ablation procedure at 4:00PM today with Dr. Jeffrey Verduzco. Pt had some concerns from his last admission regarding medications being stopped/started and getting refills. Will address with nursing and MD prior to dc. CM will continue to follow-up to ensure any additional CM needs are met. Care Management Interventions  PCP Verified by CM: Yes  Palliative Care Criteria Met (RRAT>21 & CHF Dx)?: No  Mode of Transport at Discharge:  Other (see comment) (By private vehicle with spouse)  Transition of Care Consult (CM Consult): Discharge Planning  Discharge Durable Medical Equipment: No (RW, cane, all necessary DME at home)  Health Maintenance Reviewed: Yes  Physical Therapy Consult: No  Occupational Therapy Consult: No  Speech Therapy Consult: No  Current Support Network: Lives with Spouse, Own Home, Family Lives [place] ([address] [facility] ([address]) with spouse)  Confirm Follow Up Transport: Self  Plan discussed with Pt/Family/Caregiver: Yes  Discharge Location  Discharge Placement: Home with family assistance    WYATT Collado Supervisee in Social Work, [place] [address] [facility]  [phone]

## 2018-02-27 NOTE — PROCEDURES
[address]  [phone]    Patient ID:  Patient: Jesus Manuel Bazan  MRN: [id number]  Age: 71 y.o.  : 1945  Gender: male  Study Date: 2018      History:  This is a male with persistent atrial fibrillation unable to be rate controlled with medication, here for EP study and AV node ablation. Jose Armando Hartman has a [facility] biventricular pacemaker-defibrillator.        Procedures Performed:   1. EP study with ablation of AV node function (51518)      The patient was brought to EP lab in NPO state and after informed consent.  Continuous ECG and hemodynamic monitoring was performed.  Sedation was by the EP nurse who was in constant attendance and my supervision throughout the procedure using Versed and fentanyl, 10 minutes sedation time.  Right groin was anesthetized with 1% lidocaine and access obtained (1 safe sheath in the right femoral vein).     An 8Fr sheath on the right was changed to SR-0 long support sheath.  An 8 Fr 8 mm tip deflectable mapping and ablation catheter was used.        A limited EP study was performed including atrial recording, right ventricular recording, and bundle of His recording.      After both temperature and power-limited energy was delivered to the anteroseptal region, complete heart block was obtained.  The sheath was removed and satisfactory BIV-ICD function was verified.  [place]      Preoperative Diagnosis: As above. Postoperative Diagnosis: As above. Procedure:  As above. Surgeon(s) and Role: Lopez Andrade MD - Primary   Anesthesia: [facility]. Estimated Blood Loss:  <5 cc. Specimens: * No specimens in log *   Findings:  As below. Complications:  None.      X-ray time:  0.7 minutes  Ablation time:  1 applications of 1 minute      Findings:  1.  At baseline, sinus rhythm with LBBB was present.  (,  when BIV pacing,  msec).   2.  Antegrade conduction was present and without obvious preexcitation.  The  and HV interval was 90 and 80 msec, respectively. 3.  Using electrogram mapping to facilitate precision of ablation therapy, an 8Fr 5mm tip mapping and ablation catheter was used to ablate in the anteroseptal region with a low amplitude atrial electrogram and tall ventricular electrogram.  There was a His deflection present at the ablation site. During therapy, JET was seen.  Complete heart block was present at the end of therapy after all radiofrequency application was given.      Impression:  1.  Successful ablation of AV node function causing complete heart block.   2.  Normal SJM BIV-ICD function.      RECS:  After successful AV node ablation, recommend follow-up in 2-4 weeks in the office and possibly lower the pacemaker base rate from 80 to 60 beats per minute.      Signed:  Sena Bowman MD

## 2018-02-28 ENCOUNTER — HOME HEALTH ADMISSION (OUTPATIENT)
Dept: HOME HEALTH SERVICES | Facility: HOME HEALTH | Age: 73
End: 2018-02-28

## 2018-02-28 VITALS
DIASTOLIC BLOOD PRESSURE: 87 MMHG | TEMPERATURE: 97.4 F | HEART RATE: 80 BPM | WEIGHT: 179.68 LBS | SYSTOLIC BLOOD PRESSURE: 133 MMHG | BODY MASS INDEX: 21.87 KG/M2 | RESPIRATION RATE: 16 BRPM | OXYGEN SATURATION: 96 %

## 2018-02-28 PROCEDURE — 74011250636 HC RX REV CODE- 250/636: Performed by: INTERNAL MEDICINE

## 2018-02-28 PROCEDURE — 74011250637 HC RX REV CODE- 250/637: Performed by: INTERNAL MEDICINE

## 2018-02-28 RX ORDER — FUROSEMIDE 40 MG/1
40 TABLET ORAL DAILY
Qty: 30 TAB | Refills: 2 | Status: SHIPPED | OUTPATIENT
Start: 2018-02-28 | End: 2018-05-20

## 2018-02-28 RX ORDER — METOPROLOL SUCCINATE 50 MG/1
50 TABLET, EXTENDED RELEASE ORAL DAILY
Qty: 30 TAB | Refills: 2 | Status: SHIPPED | OUTPATIENT
Start: 2018-02-28 | End: 2020-07-24

## 2018-02-28 RX ADMIN — Medication 10 ML: at 06:36

## 2018-02-28 RX ADMIN — RIVAROXABAN 20 MG: 20 TABLET, FILM COATED ORAL at 09:02

## 2018-02-28 RX ADMIN — METOPROLOL SUCCINATE 50 MG: 50 TABLET, EXTENDED RELEASE ORAL at 09:02

## 2018-02-28 RX ADMIN — FUROSEMIDE 40 MG: 10 INJECTION, SOLUTION INTRAMUSCULAR; INTRAVENOUS at 09:02

## 2018-02-28 RX ADMIN — Medication 10 ML: at 09:05

## 2018-02-28 RX ADMIN — METHIMAZOLE 30 MG: 5 TABLET ORAL at 09:02

## 2018-02-28 RX ADMIN — VALSARTAN 80 MG: 80 TABLET ORAL at 09:02

## 2018-02-28 NOTE — PROGRESS NOTES
Patient ambulated in hallway without difficulty. Dressing CDI. No complaints. Discharge instructions reviewed with patient; to be discharged to home with wife. Site care instructions reviewed; site(s) CDI. Patient instructed on which medications to continue, which to start, and which to stop. Prescriptions given to patient. Medication info provided and reviewed with patient. Follow-up appointment information given; follow-up appointment to be made by patient. IV and tele box removed. Opportunity for questions provided; all questions answered. All belongings returned. Patient wheeled to front door via wheelchair by volunteer; to be transported home by wife.

## 2018-02-28 NOTE — PROGRESS NOTES
Bedside and Verbal shift change report given to Deanna Vela RN (oncoming nurse) by Chloé Atkinson RN (offgoing nurse). Report included the following information SBAR, Kardex, Intake/Output, MAR, Accordion, Recent Results and Cardiac Rhythm NSR.

## 2018-02-28 NOTE — DISCHARGE SUMMARY
Cardiology Discharge Summary                Patient ID:  Yudith Small  [id number]  32 y.o.  1945    Admit Date: 2/26/2018    Discharge Date: 2/28/2018   Admitting Physician: Yoav García MD   Discharge Physician: Yoav García MD    Cardiology Procedures this Admission:  1. AV node ablation on 2/27    Hospital Course:  Admitted with acute on chronic systolic CHF and Afib with RVR. Given IV diuretic with improvement in dyspnea. He did convert to sinus, but still underwent an AV node ablation for definitive rate control of his paroxysmal atrial fibrillation. A BIV-ICD interrogation revealed his HR's are not controlled with this arrhythmia. I made sure to discontinue amiodarone since he has hyperthyroidism. He has follow-up in the upcoming week with Dr. Frank St regarding this. I increased his metoprolol and stopped his diltiazem while here. Plan to continue with once daily furosemide as an OP. Besides what has been mentioned, no other changes to his medicatiions. All questions answered. Aware of signs and symptoms warranting urgent medical follow-up or calling 911. Disposition: home    Patient Instructions:   Current Discharge Medication List      START taking these medications    Details   furosemide (LASIX) 40 mg tablet Take 1 Tab by mouth daily. Qty: 30 Tab, Refills: 2         CONTINUE these medications which have CHANGED    Details   metoprolol succinate (TOPROL-XL) 50 mg XL tablet Take 1 Tab by mouth daily. Qty: 30 Tab, Refills: 2         CONTINUE these medications which have NOT CHANGED    Details   methIMAzole (TAPAZOLE) 10 mg tablet Take 3 Tabs by mouth two (2) times a day. With food  Qty: 180 Tab, Refills: 11      valsartan (DIOVAN) 80 mg tablet Take 1 Tab by mouth daily. Qty: 30 Tab, Refills: 0      rivaroxaban (XARELTO) 20 mg tab tablet Take 20 mg by mouth daily. amitriptyline (ELAVIL) 10 mg tablet Take 1-2 Tabs by mouth nightly.   Qty: 100 Tab, Refills: 5 Associated Diagnoses: Oropharyngeal dysphagia; Weakness; Vitamin D deficiency; Hyperthyroidism; Metabolic myopathy; N79 deficiency; Thyrotoxic myopathy         STOP taking these medications       dilTIAZem CD (CARDIZEM CD) 180 mg ER capsule Comments:   Reason for Stopping:  Using higher dose metoprolol instead        amiodarone (PACERONE) 400 mg tablet Comments:   Reason for Stopping:  Hyperthyroidism              FOLLOW-UP:    Follow-up Appointments   Procedures    FOLLOW UP VISIT Appointment in: Two Weeks With Mao Paz NP     With Mao Paz NP     Standing Status:   Standing     Number of Occurrences:   1     Order Specific Question:   Appointment in     Answer: Two Weeks       Call the office [phone] to make an appointment. [address], [address]    [phone]  [place], [address] [facility]    [email or web address]    Signed:  Nolan Schultz MD  2/28/2018    ________________________________________________________________________                           Assessment on Discharge:                 1. Persistent Afib with RVR, possibly a contributing factor to heart failure. Back in sinus. 2. Acute on chronic systolic CHF, improved. 3. Hyperthyroidism, due to amiodarone? TSH still undetectable here. Likely driving the HR. Seen by Dr. [place] Lee who is managing his methimazole, recently increased. 4. Stress 2014 with no ischemia. 5. Hx of dilated nonischemic cardiomyopathy with EF 30% s/p BIV-ICD and then EF normalized, now ~20% by echo 2/26/2018. 6. Pleural effusion and trivial pericardial effusion noted on echo 2/26. 7. Hypertensive heart disease with heart failure and CKD. 8. CKD stage 2.  9. S/p bilateral Knee replacement, hx of prosthesis infection. 8. , helps care for son with CVA, mild functional capacity.                              Plan on Discharge:                     1. Continue Xarelto at usual dose.   2. Continue metoprolol ER but increased to 50 daily.  3. Stopped diltiazem CD. 4. Cont ARB. 5. Continue furosemide 40 daily as an OP. 6. Continue methimazole for hyperthyroidism. Stopped amiodarone regardless of whether it is causative or not for the issue. 7. 2g Na diet. 8. SJM BIV-ICD reprogrammed to base rate of 80. All questions answered.          [x]        High complexity decision making was performed. >30 minutes involved in discharge of this patient.         Subjective:     No syncope, dizziness, some palpitations. +orthopnea, PND, but no more edema. No chest pain, claudication. No stroke or TIA symptoms. No bleeding issues on Xarelto.        REVIEW OF SYSTEMS:     []         Unable to obtain  ROS due to ---   [x]         Total of 12 systems reviewed as follows:     Total of 12 systems reviewed as follows:                                                                      POSITIVE= Bold text  Negative = normal text  General:                                         fever, chills, sweats, generalized weakness, weight loss/gain,                                                                     loss of appetite   Eyes:                                                         blurred vision, eye pain, loss of vision, double vision  ENT:                                                          rhinorrhea, pharyngitis   Respiratory:                                   cough, sputum production, SOB, GARCIA, wheezing, pleuritic pain   Cardiology:                                    chest pain, palpitations, orthopnea, PND, edema, syncope   Gastrointestinal:                 abdominal pain , N/V, diarrhea, dysphagia, constipation, bleeding   Genitourinary:                     frequency, urgency, dysuria, hematuria, incontinence   Muskuloskeletal :                arthralgia, myalgia, back pain  Hematology:                                  easy bruising, nose or gum bleeding, lymphadenopathy   Dermatological:                   rash, ulceration, pruritis, color change / jaundice  Endocrine:                                     hot flashes or polydipsia   Neurological:                       headache, dizziness, confusion, focal weakness, paresthesia,                                                                    Speech difficulties, memory loss, gait difficulty  Psychological:                    Feelings of anxiety, depression, agitation     Objective:       Physical Exam:     Patient Vitals for the past 12 hrs:   Temp Pulse Resp BP SpO2   02/28/18 0634 97.4 °F (36.3 °C) 80 16 133/87 96 %   02/28/18 0316 98.7 °F (37.1 °C) 69 16 142/73 99 %   02/28/18 0310 98 °F (36.7 °C) 82 16 136/87 93 %   02/27/18 2220 97.4 °F (36.3 °C) 80 16 120/78 100 %       General Appearance: Well developed, cachectic, alert & oriented x 3,                         no acute distress. Ears/Nose/Mouth/Throat: Pupils equal and round, Hearing grossly normal.  Neck: Supple. JVP within normal limits. Carotids good upstrokes, with no bruit. Chest: Lungs clear to auscultation bilaterally, unlabored, symmetric air movement. Cardiovascular: Irregular tachycardic rate and rhythm, S1S2 normal, no murmur, rubs, gallops. Abdomen: Soft, non-tender, bowel sounds are active. No organomegaly. Extremities: Trace edema bilaterally. Radial pulses +2, Distal Pulses +1. No cyanosis or clubbing. Skin: Warm and dry. No jaundice, petechiae, or purpura. L chest BIV-ICD site without erythema, drainage, or erosion. Neuro: CN II-XII grossly intact, Strength and sensation grossly intact. Musculoskeletal:  Normal bulk and tone.       Data:       LAB Review:     No results for input(s): CPK, CKMB, CKNDX, TROIQ in the last 72 hours. No lab exists for component: CPKMB  No results found for: CHOL, CHOLX, CHLST, CHOLV, HDL, LDL, LDLC, DLDLP, TGLX, TRIGL, TRIGP, CHHD, CHHDX  No results for input(s): INR, PTP, APTT in the last 72 hours.     No lab exists for component: Chuyita Mustsakshi   Recent Labs      02/26/18 1645   NA  141   K  3.9   CL  109*   CO2  21   BUN  15   CREA  0.74   GLU  98   CA  8.3*   ALB  2.4*   WBC  10.1   HGB  12.2   HCT  37.4   PLT  281     Recent Labs      02/26/18   1645   SGOT  41*   AP  187*   TP  6.1*   ALB  2.4*   GLOB  3.7     No components found for: GLPOC  No results for input(s): PH, PCO2, PO2 in the last 72 hours.     Medications Reviewed:   No Known Allergies     Current Facility-Administered Medications   Medication Dose Route Frequency    DOBUTamine (DOBUTREX) 500 mg/250 mL (2,000 mcg/mL) infusion        sodium chloride (NS) flush 5-10 mL  5-10 mL IntraVENous Q8H    sodium chloride (NS) flush 5-10 mL  5-10 mL IntraVENous PRN    acetaminophen (TYLENOL) tablet 650 mg  650 mg Oral Q4H PRN    rivaroxaban (XARELTO) tablet 20 mg  20 mg Oral DAILY WITH BREAKFAST    valsartan (DIOVAN) tablet 80 mg  80 mg Oral DAILY    methIMAzole (TAPAZOLE) tablet 30 mg  30 mg Oral BID    amitriptyline (ELAVIL) tablet 10-20 mg  10-20 mg Oral QHS    sodium chloride (NS) flush 5-10 mL  5-10 mL IntraVENous Q8H    sodium chloride (NS) flush 5-10 mL  5-10 mL IntraVENous PRN    acetaminophen (TYLENOL) tablet 650 mg  650 mg Oral Q4H PRN    furosemide (LASIX) injection 40 mg  40 mg IntraVENous BID    metoprolol succinate (TOPROL-XL) XL tablet 50 mg  50 mg Oral DAILY          Riddhi Wilson MD  2/28/2018

## 2018-02-28 NOTE — PROGRESS NOTES
Patient is being discharged home today with hospital to home thru [facility]. His family will be taking him home.

## 2018-02-28 NOTE — PROGRESS NOTES
Physical Therapy Screening:  Services are not indicated at this time. Pt is ambulating the entire unit per chart, does NOT need PT. An InBasket screening referral was triggered for physical therapy based on results obtained during the nursing admission assessment. The patients chart was reviewed and the patient is not appropriate for a skilled therapy evaluation at this time. Please consult physical therapy if any therapy needs arise. Thank you.     Jose A Hernandez, PT, DPT

## 2018-02-28 NOTE — DISCHARGE INSTRUCTIONS
Weigh Daily  Keep Log/Record    Notify Dr. Aidan Pino for a weight gain of 3 pounds or greater in one day or 5 pounds in one week. POST-EP STUDY AND/OR ABLATION DISCHARGE INSTRUCTIONS:    You had an AV node ablation by Dr. Kadeem Grace on 2/27. This was to definitely get control of your heart rates during atrial fibrillation. From here forward, the pacing function in your device should direct the heart rate.    --STOP AMIODARONE and DILTIAZEM.  --INCREASE THE METOPROLOL TO 50 MG DAILY. --START FUROSEMIDE 40 MG DAILY. Follow-up with our nurse practitioner, Huy De La Cruz, regarding progress with your heart failure within 2 weeks. Do not drive, operate any machinery, or sign any legal documents for 24 hours after your procedure. You must have someone to drive you home. You may take a shower 24 hours after your cardiac procedure. Be sure to get the dressing wet and then remove it; gently wash the area with warm soapy water. Pat dry and leave open to air. To help prevent infections, be sure to keep the cath site clean and dry. No lotions, creams, powders, ointments, etc. in the cath site for approximately 1 week.  Do not take a tub bath, get in a hot tub or swimming pool for approximately 5 days or until the cath site is completely healed.  No strenuous activity or heavy lifting over 20 lbs. for 7 days.  After your procedure, some bruising or discomfort is common during the healing process. Tylenol, 1-2 tablets every 6 hours as needed, is recommended if you experience any discomfort. If you experience any signs or symptoms of infection such as fever, chills, or poorly healing incision, persistent tenderness or swelling in the groin, redness and/or warmth to the touch, numbness, significant tingling or pain at the groin site or affected extremity, rash, drainage from the site, or if the leg feels tight or swollen, call your physician right away.      If bleeding at the site occurs, take a clean gauze pad and apply direct pressure to the groin just above the puncture site, and call your physician right away.  If your procedure involved ablation therapy, you may feel some mild or vague chest discomfort due to delivery of heat therapy to the heart muscle. This should resolve in 1-2 days. If it gets worse or is associated with shortness of breath, dizziness, loss of consciousness, call your physician right away or call 911 if emergency medical care is needed.     Signed:  Ruslan Song MD

## 2018-03-02 ENCOUNTER — HOME CARE VISIT (OUTPATIENT)
Dept: SCHEDULING | Facility: HOME HEALTH | Age: 73
End: 2018-03-02

## 2018-03-02 PROCEDURE — G0495 RN CARE TRAIN/EDU IN HH: HCPCS

## 2018-03-06 LAB
T3 SERPL-MCNC: 115 NG/DL (ref 71–180)
T4 FREE SERPL-MCNC: 2.83 NG/DL (ref 0.82–1.77)
TSH SERPL DL<=0.005 MIU/L-ACNC: <0.006 UIU/ML (ref 0.45–4.5)

## 2018-03-08 ENCOUNTER — OFFICE VISIT (OUTPATIENT)
Dept: ENDOCRINOLOGY | Age: 73
End: 2018-03-08

## 2018-03-08 VITALS
DIASTOLIC BLOOD PRESSURE: 70 MMHG | OXYGEN SATURATION: 100 % | WEIGHT: 180.2 LBS | SYSTOLIC BLOOD PRESSURE: 106 MMHG | HEIGHT: 76 IN | HEART RATE: 81 BPM | BODY MASS INDEX: 21.94 KG/M2

## 2018-03-08 DIAGNOSIS — E05.00 GRAVES DISEASE: Primary | ICD-10-CM

## 2018-03-08 RX ORDER — AMITRIPTYLINE HYDROCHLORIDE 10 MG/1
10-20 TABLET, FILM COATED ORAL
Qty: 100 TAB | Refills: 5 | COMMUNITY
Start: 2018-03-08 | End: 2018-05-31 | Stop reason: CLARIF

## 2018-03-08 NOTE — MR AVS SNAPSHOT
[address] [place] [address] [address] [facility] [address] [address] [phone] Patient: Srinivasan Conde MRN: [id number] DESTINY:[birth year] Visit Information Date & Time Provider Department Dept. Phone Encounter #  
 3/8/2018  2:10 PM Vijaya Rothman MD [facility] [phone] [id number] Follow-up Instructions Return in about 4 months (around 7/8/2018). Your Appointments 3/21/2018  1:00 PM  
PROCEDURE with Shira Cardoza MD  
Neurology Clinic at [address]) Appt Note: EMG: R arm, both legs, R/o ALS/Muscle disease, jrb 2/13/18  
 [address] [place] [address], [address] [address]  
[address] [facility], [address] [place] [address] Avenue, [address] [address]  
  
    
 5/11/2018  2:40 PM  
Follow Up with Shira Cardoza MD  
Neurology Clinic at [address]) Appt Note: f/u weakness, jrb 2/13/18  
 [address] [place] [address], 
[address] [place] [address], [address] [address]  
[address] [facility], [address] [place] [address], [address] [address] Upcoming Health Maintenance Date Due Hepatitis C Screening 1945 DTaP/Tdap/Td series (1 - Tdap) 5/24/1967 FOBT Q 1 YEAR AGE 50-75 5/24/1996 ZOSTER VACCINE AGE 60> 3/24/2006 GLAUCOMA SCREENING Q2Y 5/24/2011 Bone Densitometry (Dexa) Screening 5/24/2011 Influenza Age 5 to Adult 8/1/2017 Pneumococcal 65+ Low/Medium Risk (2 of 2 - PPSV23) 10/1/2019 Allergies as of 3/8/2018  Review Complete On: 3/8/2018 By: Vijaya Rothman MD  
 No Known Allergies Current Immunizations  Reviewed on 2/27/2015 Name Date Influenza Vaccine 10/1/2014 Pneumococcal Vaccine (Unspecified Type) 10/1/2014 Not reviewed this visit You Were Diagnosed With   
  
 Codes Comments Graves disease    -  Primary ICD-10-CM: E05.00 ICD-9-CM: 242.00   
  
 Vitals BP Pulse Height(growth percentile) Weight(growth percentile) SpO2 BMI  
 106/70 81 6' 4\" (1.93 m) 180 lb 3.2 oz (81.7 kg) 100% 21.93 kg/m2 Smoking Status Former Smoker Vitals History BMI and BSA Data Body Mass Index Body Surface Area  
 21.93 kg/m 2 2.09 m 2 Preferred Pharmacy Pharmacy Name Phone [id number], [address] [phone] Your Updated Medication List  
  
   
This list is accurate as of 3/8/18  2:53 PM.  Always use your most recent med list.  
  
  
  
  
 amitriptyline 10 mg tablet Commonly known as:  ELAVIL Take 1-2 Tabs by mouth nightly as needed for Sleep.  
  
 furosemide 40 mg tablet Commonly known as:  LASIX Take 1 Tab by mouth daily. methIMAzole 10 mg tablet Commonly known as:  TAPAZOLE Take 3 Tabs by mouth two (2) times a day. With food  
  
 metoprolol succinate 50 mg XL tablet Commonly known as:  TOPROL-XL Take 1 Tab by mouth daily. valsartan 80 mg tablet Commonly known as:  DIOVAN Take 1 Tab by mouth daily. XARELTO 20 mg Tab tablet Generic drug:  rivaroxaban Take 20 mg by mouth daily. Follow-up Instructions Return in about 4 months (around 7/8/2018). To-Do List   
 04/19/2018 Lab:  T3 TOTAL   
  
 04/19/2018 Lab:  T4, FREE   
  
 04/19/2018 Lab:  TSH 3RD GENERATION   
  
 07/01/2018 Lab:  T3 TOTAL   
  
 07/01/2018 Lab:  T4, FREE   
  
 07/01/2018 Lab:  TSH 3RD GENERATION   
  
 07/01/2018 Lab:  TSH RECEPTOR AB Patient Instructions 1) Your free T4 has come down from > 8 to 2.83 which is still high but much improved as normal is under 1.7. Your T3 level has already come back to normal.  Your TSH tends to take longer normalize so I'm not concerned that this level is still low.  
 
2) I will have you stay on 3 tabs of methimazole twice daily for another 6 weeks and then go and repeat your labs and I'll message you through [employer] with the results to see if we can start tapering your dose at that time. 3) Your Your TSH receptor antibody, which is a test for Grave's disease, is high at 1.98 which confirms this is the reason for your hyperthyroidism. As long as this is elevated over 0.5, we will not be able to stop the medication and will try to get to the lowest dose possible that keeps your levels normal and hopefully in the future will be able to taper you off medication. Introducing [facility]! Dear Shaila Hanson: Thank you for requesting a [employer] account. Our records indicate that you already have an active [employer] account. You can access your account anytime at [email or web address] Did you know that you can access your hospital and ER discharge instructions at any time in [employer]? You can also review all of your test results from your hospital stay or ER visit. Additional Information If you have questions, please visit the Frequently Asked Questions section of the [employer] website at [email or web address] Remember, [employer] is NOT to be used for urgent needs. For medical emergencies, dial 911. Now available from your iPhone and Android! Please provide this summary of care documentation to your next provider. Your primary care clinician is listed as Brittany Parr. If you have any questions after today's visit, please call [phone].

## 2018-03-08 NOTE — PATIENT INSTRUCTIONS
1) Your free T4 has come down from > 8 to 2.83 which is still high but much improved as normal is under 1.7. Your T3 level has already come back to normal.  Your TSH tends to take longer normalize so I'm not concerned that this level is still low. 2) I will have you stay on 3 tabs of methimazole twice daily for another 6 weeks and then go and repeat your labs and I'll message you through [employer] with the results to see if we can start tapering your dose at that time. 3) Your Your TSH receptor antibody, which is a test for Grave's disease, is high at 1.98 which confirms this is the reason for your hyperthyroidism. As long as this is elevated over 0.5, we will not be able to stop the medication and will try to get to the lowest dose possible that keeps your levels normal and hopefully in the future will be able to taper you off medication.

## 2018-03-08 NOTE — PROGRESS NOTES
Chief Complaint   Patient presents with    Thyroid Problem     pcp and pharmacy confirmed     History of Present Illness: Jayleen Calabrese is a 71 y.o. male here for follow up of thyroid. Weight down 13 lbs since hospital stay in 1/18. Has been compliant with methimazole 30 mg bid and over the past week has started to have his appetite come back and not having as much nausea. Bowels are less loose. Still has some tremor but better. Not as tired. Sleeping better. He continues to have a lot of shortness of breath with exertion and needed to pause several times when walking from the waiting room to the exam room and back. Underwent a-fib ablation with Dr. Stoney Brumfield at the end of 2/18. Was taken off amiodarone and diltiazem and just on metoprolol at this time. Current Outpatient Prescriptions   Medication Sig    amitriptyline (ELAVIL) 10 mg tablet Take 1-2 Tabs by mouth nightly as needed for Sleep.  furosemide (LASIX) 40 mg tablet Take 1 Tab by mouth daily.  metoprolol succinate (TOPROL-XL) 50 mg XL tablet Take 1 Tab by mouth daily.  methIMAzole (TAPAZOLE) 10 mg tablet Take 3 Tabs by mouth two (2) times a day. With food    valsartan (DIOVAN) 80 mg tablet Take 1 Tab by mouth daily.  rivaroxaban (XARELTO) 20 mg tab tablet Take 20 mg by mouth daily. No current facility-administered medications for this visit. No Known Allergies     Review of Systems:  - Cardiovascular: no chest pain  - Neurological: (+) tremors  - Integumentary: skin is normal    Physical Examination:  Blood pressure 106/70, pulse 81, height 6' 4\" (1.93 m), weight 180 lb 3.2 oz (81.7 kg), SpO2 100 %.   - General: pleasant, no distress, good eye contact   - Neck: (+) small goiter, no thyroid bruits  - Cardiovascular: regular, normal rate, nl s1 and s2, no m/r/g   - Respiratory: clear to auscultation bilaterally   - Integumentary: skin is normal, no edema  - Neurological: reflexes 2+ at biceps, mild tremor  - Psychiatric: normal mood and affect    Data Reviewed:   Component      Latest Ref Rng & Units 3/5/2018 3/5/2018 3/5/2018          10:34 AM 10:34 AM 10:34 AM   TSH      0.450 - 4.500 uIU/mL   <0.006 (L)   T4, Free      0.82 - 1.77 ng/dL  2.83 (H)    T3, total      71 - 180 ng/dL 115       Component      Latest Ref Rng & Units 1/23/2018 1/23/2018 1/23/2018 1/23/2018           4:01 AM  4:01 AM  4:01 AM  4:01 AM   T4, Free      0.8 - 1.5 NG/DL   >8.0 (H)    T3, total      71 - 180 ng/dL  229 (H)     Thyrotropin Receptor Ab, serum      0.00 - 1.75 IU/L 1.98 (H)      TSH      0.36 - 3.74 uIU/mL    <0.01 (L)       Assessment/Plan:   1. Grave's disease: Was seen for hospital consultation in Jan 2018 and his TSH was < 0.01 and FT4 was > 8.0 and T3 was 229 and TRAb was 1.98. I initially started him on methimazole 20 mg bid but he called our office 2 weeks later and was not feeling better so I increased his dose to 30 mg bid and his TSH is still < 0.006 but FT4 down to 2.83 and T3 normal at 115 in 3/18. I explained that the TSH takes longer to normalize so we'll keep his dose the same for now. Still having a lot of dyspnea with exertion and hopefully this will improve as his thyroid continues to improve. May need to have his beta blocker decreased in the future if his BP remains on the low side with getting his thyroid regulated. We spent 30 minutes of face to face time together and > 50% of the time was spent in counseling regarding management of his thyroid. - cont methimazole 30 mg bid  - check TSH, free T4 and Total T3 in 6 weeks and prior to next visit  - check TSH receptor ab prior to next visit         Patient Instructions   1) Your free T4 has come down from > 8 to 2.83 which is still high but much improved as normal is under 1.7. Your T3 level has already come back to normal.  Your TSH tends to take longer normalize so I'm not concerned that this level is still low.     2) I will have you stay on 3 tabs of methimazole twice daily for another 6 weeks and then go and repeat your labs and I'll message you through [employer] with the results to see if we can start tapering your dose at that time. 3) Your Your TSH receptor antibody, which is a test for Grave's disease, is high at 1.98 which confirms this is the reason for your hyperthyroidism. As long as this is elevated over 0.5, we will not be able to stop the medication and will try to get to the lowest dose possible that keeps your levels normal and hopefully in the future will be able to taper you off medication. Follow-up Disposition:  Return in about 4 months (around 7/8/2018). Copy sent to: Suly Hyatt MD as PCP - General (Family Practice)  Lucie Rasmussen MD (Cardiology)  Bonilla Chery (GI)    Lab follow up: 4/22/18  Component      Latest Ref Rng & Units 4/17/2018 4/17/2018 4/17/2018           9:04 AM  9:04 AM  9:04 AM   T4, Free      0.82 - 1.77 ng/dL   1.17   TSH      0.450 - 4.500 uIU/mL  9.670 (H)    T3, total      71 - 180 ng/dL 76       Sent him the following message through [employer]:  TSH is a thyroid test.  Your level is 9.67 which is high and above goal of 0.5-2.0. This test goes opposite of your thyroid dose and suggests your dose of methimazole is more than you need. I will decrease your dose to 20 mg (2 of the 10 mg tabs) twice daily through the end of May and then decrease your dose to 30 mg once daily (take all 3 tabs together) starting June 1st until you come back to see me in July. I updated your med list but did not send a new prescription to your pharmacy on file that has been filling this med.     Lab follow up: 5/16/18  Component      Latest Ref Rng & Units 5/15/2018 5/15/2018 5/15/2018          10:13 AM 10:13 AM 10:13 AM   T3, total      71 - 180 ng/dL   66 (L)   T4, Free      0.82 - 1.77 ng/dL  0.88    TSH      0.450 - 4.500 uIU/mL 43.350 (H)       Sent him the following message through MyChart:  TSH is a thyroid test.  Your level is 43 which is very high and above goal of 0.5-2.0. This test goes opposite of your thyroid dose and suggests your dose of methimazole is way more than you need and you have become more hypothyroid (slow metabolism). I will decrease your dose to 20 mg daily (just take 2 of the 10 mg tabs in the morning only and stop the evening dose) until you come back to see me in July and repeat your labs again prior to this visit. I updated your med list but did not send a new prescription to your pharmacy on file that has been filling this med. Addendum: 5/21/18    We had the following e-mail exchange:    Lorraine Vega will send him a note too.  ===View-only below this line===      ----- Message -----     From: Cinthya Leggett     Sent: 5/21/2018  2:07 PM EDT       To: Zay Du MD  Subject: RE:lab results    I have an appointment at 8  with Dr Mariola Arthur on 5/22/18.  ----- Message -----  From: Zay Du MD  Sent: 5/21/2018  8:38 AM EDT  To: Cinthya Leggett  Subject: RE:lab results    It's possible that the thyroid condition could be the cause of the ascites as hypothyroidism can cause ascites. Therefore with cutting back on the methimazole, I hope this will help with fluid retention that is causing this.    ----- Message -----     From: Cinthya Leggett     Sent: 5/20/2018  8:52 PM EDT       To: Zay Du MD  Subject: RE:lab results    On May 20th I went to the ER,  I have an  Ascites Problem and I   am seeing my Primary DrMagdalena to schedule an appointment to have it fixed.   I am on Xarelto so I will be treated as an out paient  ----- Message -----

## 2018-04-18 LAB
T3 SERPL-MCNC: 76 NG/DL (ref 71–180)
T4 FREE SERPL-MCNC: 1.17 NG/DL (ref 0.82–1.77)
TSH SERPL DL<=0.005 MIU/L-ACNC: 9.67 UIU/ML (ref 0.45–4.5)

## 2018-04-22 RX ORDER — METHIMAZOLE 10 MG/1
TABLET ORAL
Qty: 180 TAB | Refills: 11
Start: 2018-04-22 | End: 2018-05-16 | Stop reason: SDUPTHER

## 2018-05-14 ENCOUNTER — TELEPHONE (OUTPATIENT)
Dept: ENDOCRINOLOGY | Age: 73
End: 2018-05-14

## 2018-05-14 DIAGNOSIS — E05.00 GRAVES DISEASE: ICD-10-CM

## 2018-05-14 NOTE — TELEPHONE ENCOUNTER
----- Message from Ian Romo sent at 5/14/2018 11:57 AM EDT -----  Regarding: Dr Mary Ernandez would like a call back to confirm whether or not he can lab work done to check his thyroid before he sees two other doctors. Pt can be reached at [phone]. Pt is going to see a stomach and a cardiologist. Pt wants to make sure everything is okay with his thyroid before he goes  to see them.

## 2018-05-14 NOTE — TELEPHONE ENCOUNTER
Please let him know I put a new lab order in the system so he can ask to have labs drawn under my name and I'll be in touch with the results.

## 2018-05-14 NOTE — TELEPHONE ENCOUNTER
Patient would like to know if you could recheck his thyroid labs. He stated that he would like to make sure that his thyroid is not causing issues with his heart or GI. He knows he recently had labs in April but he wants to rule out everything.

## 2018-05-16 LAB
T3 SERPL-MCNC: 66 NG/DL (ref 71–180)
T4 FREE SERPL-MCNC: 0.88 NG/DL (ref 0.82–1.77)
TSH SERPL DL<=0.005 MIU/L-ACNC: 43.35 UIU/ML (ref 0.45–4.5)

## 2018-05-16 RX ORDER — METHIMAZOLE 10 MG/1
TABLET ORAL
Qty: 180 TAB | Refills: 11
Start: 2018-05-16 | End: 2018-06-01 | Stop reason: SDUPTHER

## 2018-05-20 ENCOUNTER — HOSPITAL ENCOUNTER (EMERGENCY)
Age: 73
Discharge: HOME | End: 2018-05-20
Attending: EMERGENCY MEDICINE
Payer: MEDICARE

## 2018-05-20 ENCOUNTER — APPOINTMENT (OUTPATIENT)
Dept: CT IMAGING | Age: 73
End: 2018-05-20
Attending: EMERGENCY MEDICINE
Payer: MEDICARE

## 2018-05-20 ENCOUNTER — APPOINTMENT (OUTPATIENT)
Dept: GENERAL RADIOLOGY | Age: 73
End: 2018-05-20
Payer: MEDICARE

## 2018-05-20 VITALS
HEART RATE: 80 BPM | BODY MASS INDEX: 26.28 KG/M2 | TEMPERATURE: 97.5 F | OXYGEN SATURATION: 100 % | RESPIRATION RATE: 18 BRPM | HEIGHT: 76 IN | SYSTOLIC BLOOD PRESSURE: 114 MMHG | DIASTOLIC BLOOD PRESSURE: 81 MMHG | WEIGHT: 215.83 LBS

## 2018-05-20 DIAGNOSIS — R18.8 OTHER ASCITES: Primary | ICD-10-CM

## 2018-05-20 LAB
ALBUMIN SERPL-MCNC: 2.5 G/DL (ref 3.5–5)
ALBUMIN/GLOB SERPL: 0.6 {RATIO} (ref 1.1–2.2)
ALP SERPL-CCNC: 178 U/L (ref 45–117)
ALT SERPL-CCNC: 23 U/L (ref 12–78)
ANION GAP SERPL CALC-SCNC: 7 MMOL/L (ref 5–15)
APPEARANCE UR: CLEAR
AST SERPL-CCNC: 28 U/L (ref 15–37)
BACTERIA URNS QL MICRO: ABNORMAL /HPF
BASOPHILS # BLD: 0 K/UL (ref 0–0.1)
BASOPHILS NFR BLD: 0 % (ref 0–1)
BILIRUB SERPL-MCNC: 0.5 MG/DL (ref 0.2–1)
BILIRUB UR QL CFM: NEGATIVE
BUN SERPL-MCNC: 25 MG/DL (ref 6–20)
BUN/CREAT SERPL: 18 (ref 12–20)
CALCIUM SERPL-MCNC: 8.7 MG/DL (ref 8.5–10.1)
CAOX CRY URNS QL MICRO: ABNORMAL
CHLORIDE SERPL-SCNC: 107 MMOL/L (ref 97–108)
CO2 SERPL-SCNC: 25 MMOL/L (ref 21–32)
COLOR UR: ABNORMAL
CREAT SERPL-MCNC: 1.36 MG/DL (ref 0.7–1.3)
DIFFERENTIAL METHOD BLD: ABNORMAL
EOSINOPHIL # BLD: 0.1 K/UL (ref 0–0.4)
EOSINOPHIL NFR BLD: 1 % (ref 0–7)
EPITH CASTS URNS QL MICRO: ABNORMAL /LPF
ERYTHROCYTE [DISTWIDTH] IN BLOOD BY AUTOMATED COUNT: 15.2 % (ref 11.5–14.5)
GLOBULIN SER CALC-MCNC: 4.4 G/DL (ref 2–4)
GLUCOSE SERPL-MCNC: 135 MG/DL (ref 65–100)
GLUCOSE UR STRIP.AUTO-MCNC: NEGATIVE MG/DL
HCT VFR BLD AUTO: 46 % (ref 36.6–50.3)
HGB BLD-MCNC: 15.1 G/DL (ref 12.1–17)
HGB UR QL STRIP: NEGATIVE
HYALINE CASTS URNS QL MICRO: ABNORMAL /LPF (ref 0–5)
IMM GRANULOCYTES # BLD: 0.1 K/UL (ref 0–0.04)
IMM GRANULOCYTES NFR BLD AUTO: 0 % (ref 0–0.5)
KETONES UR QL STRIP.AUTO: ABNORMAL MG/DL
LEUKOCYTE ESTERASE UR QL STRIP.AUTO: NEGATIVE
LYMPHOCYTES # BLD: 0.9 K/UL (ref 0.8–3.5)
LYMPHOCYTES NFR BLD: 8 % (ref 12–49)
MCH RBC QN AUTO: 29.2 PG (ref 26–34)
MCHC RBC AUTO-ENTMCNC: 32.8 G/DL (ref 30–36.5)
MCV RBC AUTO: 88.8 FL (ref 80–99)
MONOCYTES # BLD: 0.9 K/UL (ref 0–1)
MONOCYTES NFR BLD: 8 % (ref 5–13)
NEUTS SEG # BLD: 9.3 K/UL (ref 1.8–8)
NEUTS SEG NFR BLD: 83 % (ref 32–75)
NITRITE UR QL STRIP.AUTO: NEGATIVE
NRBC # BLD: 0 K/UL (ref 0–0.01)
NRBC BLD-RTO: 0 PER 100 WBC
PH UR STRIP: 6 [PH] (ref 5–8)
PLATELET # BLD AUTO: 465 K/UL (ref 150–400)
PMV BLD AUTO: 10.1 FL (ref 8.9–12.9)
POTASSIUM SERPL-SCNC: 4.4 MMOL/L (ref 3.5–5.1)
PROT SERPL-MCNC: 6.9 G/DL (ref 6.4–8.2)
PROT UR STRIP-MCNC: 30 MG/DL
RBC # BLD AUTO: 5.18 M/UL (ref 4.1–5.7)
RBC #/AREA URNS HPF: ABNORMAL /HPF (ref 0–5)
SODIUM SERPL-SCNC: 139 MMOL/L (ref 136–145)
SP GR UR REFRACTOMETRY: 1.03 (ref 1–1.03)
UROBILINOGEN UR QL STRIP.AUTO: 0.2 EU/DL (ref 0.2–1)
WBC # BLD AUTO: 11.2 K/UL (ref 4.1–11.1)
WBC URNS QL MICRO: ABNORMAL /HPF (ref 0–4)

## 2018-05-20 PROCEDURE — 96374 THER/PROPH/DIAG INJ IV PUSH: CPT

## 2018-05-20 PROCEDURE — 36415 COLL VENOUS BLD VENIPUNCTURE: CPT | Performed by: PHYSICIAN ASSISTANT

## 2018-05-20 PROCEDURE — 74177 CT ABD & PELVIS W/CONTRAST: CPT

## 2018-05-20 PROCEDURE — 74011636320 HC RX REV CODE- 636/320: Performed by: EMERGENCY MEDICINE

## 2018-05-20 PROCEDURE — 80053 COMPREHEN METABOLIC PANEL: CPT | Performed by: PHYSICIAN ASSISTANT

## 2018-05-20 PROCEDURE — 85025 COMPLETE CBC W/AUTO DIFF WBC: CPT | Performed by: PHYSICIAN ASSISTANT

## 2018-05-20 PROCEDURE — 74011250636 HC RX REV CODE- 250/636: Performed by: EMERGENCY MEDICINE

## 2018-05-20 PROCEDURE — 74022 RADEX COMPL AQT ABD SERIES: CPT

## 2018-05-20 PROCEDURE — 81001 URINALYSIS AUTO W/SCOPE: CPT | Performed by: PHYSICIAN ASSISTANT

## 2018-05-20 PROCEDURE — 99283 EMERGENCY DEPT VISIT LOW MDM: CPT

## 2018-05-20 RX ORDER — FUROSEMIDE 10 MG/ML
60 INJECTION INTRAMUSCULAR; INTRAVENOUS
Status: COMPLETED | OUTPATIENT
Start: 2018-05-20 | End: 2018-05-20

## 2018-05-20 RX ORDER — SODIUM CHLORIDE 9 MG/ML
50 INJECTION, SOLUTION INTRAVENOUS
Status: COMPLETED | OUTPATIENT
Start: 2018-05-20 | End: 2018-05-20

## 2018-05-20 RX ORDER — SODIUM CHLORIDE 0.9 % (FLUSH) 0.9 %
10 SYRINGE (ML) INJECTION
Status: COMPLETED | OUTPATIENT
Start: 2018-05-20 | End: 2018-05-20

## 2018-05-20 RX ORDER — SODIUM CHLORIDE 9 MG/ML
1000 INJECTION, SOLUTION INTRAVENOUS ONCE
Status: DISCONTINUED | OUTPATIENT
Start: 2018-05-20 | End: 2018-05-20 | Stop reason: HOSPADM

## 2018-05-20 RX ORDER — FUROSEMIDE 40 MG/1
60 TABLET ORAL DAILY
Qty: 30 TAB | Refills: 2 | Status: SHIPPED | OUTPATIENT
Start: 2018-05-20 | End: 2018-07-05

## 2018-05-20 RX ORDER — OXYCODONE HYDROCHLORIDE 5 MG/1
5 TABLET ORAL
Qty: 6 TAB | Refills: 0 | Status: SHIPPED | OUTPATIENT
Start: 2018-05-20 | End: 2018-05-31 | Stop reason: CLARIF

## 2018-05-20 RX ORDER — SPIRONOLACTONE 25 MG/1
25 TABLET ORAL DAILY
Qty: 30 TAB | Refills: 0 | Status: SHIPPED | OUTPATIENT
Start: 2018-05-20

## 2018-05-20 RX ADMIN — Medication 10 ML: at 13:49

## 2018-05-20 RX ADMIN — FUROSEMIDE 60 MG: 10 INJECTION, SOLUTION INTRAMUSCULAR; INTRAVENOUS at 14:12

## 2018-05-20 RX ADMIN — IOPAMIDOL 100 ML: 755 INJECTION, SOLUTION INTRAVENOUS at 13:49

## 2018-05-20 RX ADMIN — SODIUM CHLORIDE 50 ML/HR: 900 INJECTION, SOLUTION INTRAVENOUS at 13:49

## 2018-05-20 NOTE — ED TRIAGE NOTES
Assumed care of this patient. He is alert and oriented x4. Patient reports that he has been experiencing bloating and abd pressure/pain x2 weeks. He states that he had colon surgery in Jan for tumor and polyp removal and has had abnormal bowel pattern since surgery. Patient currently having diarrhea, last BM a few hours ago. Abd is distended and hard.

## 2018-05-20 NOTE — ED PROVIDER NOTES
EMERGENCY DEPARTMENT HISTORY AND PHYSICAL EXAM      Date: 5/20/2018  Patient Name: Hayley Black    History of Presenting Illness     Chief Complaint   Patient presents with   [facility]     reports abdominal pain and bloating for several weeks. states, \"I think I have a blockage. \"       History Provided By: Patient    HPI: Hayley Black, 71 y.o. male with PMHx significant for CHF (Xarelto), HTN, BPH, DVT, A-fib, arthritis, and hyperthyroidism, presents ambulatory to the ED with cc of diffuse abdominal distention progressively worsening over the last 3 weeks. Pt reports no associated sx. Pt states that he had colon surgery in January for tumor and polyp tumor removal, and since then, has had abnormal bowel pattern. Pt reports that he was seen by his PCP, Dr. Doroteo Villela, where an x-ray was performed, and resulted unremarkable. He denies Hx of liver issues, as well as experiencing similar symptoms in the past. He denies any exacerbating or relieving factors to his sx. Pt denies taking any medications for sx PTA. Pt specifically denies fever, chills, CP, and SOB. Chief Complaint: Bloating  Duration: 3 Weeks  Timing:  Worsening  Location: Abd  Modifying Factors: None  Associated Symptoms: denies any other associated signs or symptoms    There are no other complaints, changes, or physical findings at this time.     Social Hx: Former Tobacco, Former EtOH, -Illicit Drug use    PCP: Tad Montero MD   Specialist: Drew Luevano MD, [facility] (cardiology)    Current Facility-Administered Medications   Medication Dose Route Frequency Provider Last Rate Last Dose    0.9% sodium chloride infusion 1,000 mL  1,000 mL IntraVENous ONCE Meri Butcher, DO         Current Outpatient Prescriptions   Medication Sig Dispense Refill    methIMAzole (TAPAZOLE) 10 mg tablet Take 20 mg daily--Dose change 5/16/18--updated med list--did not send prescription to the pharmacy 180 Tab 11    amitriptyline (ELAVIL) 10 mg tablet Take 1-2 Tabs by mouth nightly as needed for Sleep. 100 Tab 5    furosemide (LASIX) 40 mg tablet Take 1 Tab by mouth daily. 30 Tab 2    metoprolol succinate (TOPROL-XL) 50 mg XL tablet Take 1 Tab by mouth daily. 30 Tab 2    valsartan (DIOVAN) 80 mg tablet Take 1 Tab by mouth daily. 30 Tab 0    rivaroxaban (XARELTO) 20 mg tab tablet Take 20 mg by mouth daily.          Past History     Past Medical History:  Past Medical History:   Diagnosis Date    Arrhythmia     afib    Arthritis     Atrial fibrillation ([address] Dx 5/19/14    Dr Ruy Chavarria [phone]    Hypertension     Hyperthyroidism 1/23/2018    Hypertrophy (benign) of prostate     Thromboembolus [facility]) 2003    s/p Lt knee surgery (was on Coumadin x3 yr)       Past Surgical History:  Past Surgical History:   Procedure Laterality Date    COLONOSCOPY N/A 1/15/2018    [facility]  1/15/2018         [facility]  1/15/2018         HX ORTHOPAEDIC      back surgery no hardware    HX ORTHOPAEDIC  2014    right knee surgery arthroscopy    HX ORTHOPAEDIC  3/23/15    REVISION TO LEFT TOTAL KNEE REPLACEMENT    HX PACEMAKER  8/2014    3 wire    TOTAL KNEE ARTHROPLASTY  2003    Left    TOTAL KNEE ARTHROPLASTY  2009    Right    UPPER GI ENDOSCOPY,BIOPSY  1/15/2018            Family History:  Family History   Problem Relation Age of Onset    Cancer Mother      throat cancer    COPD Mother     Cancer Father      kidney cancer    Other Father      neck fx and paraplegia    Hypertension Sister     Arthritis-osteo Sister     Stroke Child        Social History:  Social History   Substance Use Topics    Smoking status: Former Smoker     Packs/day: 1.00     Years: 30.00     Quit date: 5/23/2004    Smokeless tobacco: Never Used    Alcohol use No      Comment: no alcohol for the pask 6 months       Allergies:  No Known Allergies      Review of Systems   Review of Systems   Constitutional: Negative for chills and fever. HENT: Negative for congestion and sore throat. Eyes: Negative for visual disturbance. Respiratory: Negative for cough and shortness of breath. Cardiovascular: Negative for chest pain and leg swelling. Gastrointestinal: Positive for abdominal distention. Negative for abdominal pain, blood in stool, constipation, diarrhea, nausea and vomiting. Endocrine: Negative for polyuria. Genitourinary: Negative for dysuria and testicular pain. Musculoskeletal: Negative for arthralgias, joint swelling and myalgias. Skin: Negative for rash. Allergic/Immunologic: Negative for immunocompromised state. Neurological: Negative for weakness and headaches. Hematological: Does not bruise/bleed easily. Psychiatric/Behavioral: Negative for confusion. Physical Exam   Physical Exam   Constitutional: He is oriented to person, place, and time. He appears well-developed and well-nourished. HENT:   Head: Normocephalic and atraumatic. Moist mucous membranes   Eyes: Conjunctivae are normal. Pupils are equal, round, and reactive to light. Right eye exhibits no discharge. Left eye exhibits no discharge. Neck: Normal range of motion. Neck supple. No tracheal deviation present. Cardiovascular: Normal rate, regular rhythm and normal heart sounds. No murmur heard. Venous stasis change BLE. Pulmonary/Chest: Effort normal and breath sounds normal. No respiratory distress. He has no wheezes. He has no rales. Abdominal: Bowel sounds are normal. He exhibits distension and ascites. There is no tenderness. There is no rebound and no guarding. Abd distention, palpable fluid concerning for ascites. Musculoskeletal: Normal range of motion. He exhibits edema (+Pitting BLE). He exhibits no tenderness or deformity. Neurological: He is alert and oriented to person, place, and time. Skin: Skin is warm and dry. No rash noted. No erythema.    Chronic venous stasis changes in the BL lower extremities      Psychiatric: His behavior is normal.   Nursing note and vitals reviewed. Diagnostic Study Results     Labs -     Recent Results (from the past 12 hour(s))   METABOLIC PANEL, COMPREHENSIVE    Collection Time: 05/20/18 11:37 AM   Result Value Ref Range    Sodium 139 136 - 145 mmol/L    Potassium 4.4 3.5 - 5.1 mmol/L    Chloride 107 97 - 108 mmol/L    CO2 25 21 - 32 mmol/L    Anion gap 7 5 - 15 mmol/L    Glucose 135 (H) 65 - 100 mg/dL    BUN 25 (H) 6 - 20 MG/DL    Creatinine 1.36 (H) 0.70 - 1.30 MG/DL    BUN/Creatinine ratio 18 12 - 20      GFR est AA >60 >60 ml/min/1.73m2    GFR est non-AA 52 (L) >60 ml/min/1.73m2    Calcium 8.7 8.5 - 10.1 MG/DL    Bilirubin, total 0.5 0.2 - 1.0 MG/DL    ALT (SGPT) 23 12 - 78 U/L    AST (SGOT) 28 15 - 37 U/L    Alk. phosphatase 178 (H) 45 - 117 U/L    Protein, total 6.9 6.4 - 8.2 g/dL    Albumin 2.5 (L) 3.5 - 5.0 g/dL    Globulin 4.4 (H) 2.0 - 4.0 g/dL    A-G Ratio 0.6 (L) 1.1 - 2.2     CBC WITH AUTOMATED DIFF    Collection Time: 05/20/18 11:37 AM   Result Value Ref Range    WBC 11.2 (H) 4.1 - 11.1 K/uL    RBC 5.18 4.10 - 5.70 M/uL    HGB 15.1 12.1 - 17.0 g/dL    HCT 46.0 36.6 - 50.3 %    MCV 88.8 80.0 - 99.0 FL    MCH 29.2 26.0 - 34.0 PG    MCHC 32.8 30.0 - 36.5 g/dL    RDW 15.2 (H) 11.5 - 14.5 %    PLATELET 879 (H) 847 - 400 K/uL    MPV 10.1 8.9 - 12.9 FL    NRBC 0.0 0  WBC    ABSOLUTE NRBC 0.00 0.00 - 0.01 K/uL    NEUTROPHILS 83 (H) 32 - 75 %    LYMPHOCYTES 8 (L) 12 - 49 %    MONOCYTES 8 5 - 13 %    EOSINOPHILS 1 0 - 7 %    BASOPHILS 0 0 - 1 %    IMMATURE GRANULOCYTES 0 0.0 - 0.5 %    ABS. NEUTROPHILS 9.3 (H) 1.8 - 8.0 K/UL    ABS. LYMPHOCYTES 0.9 0.8 - 3.5 K/UL    ABS. MONOCYTES 0.9 0.0 - 1.0 K/UL    ABS. EOSINOPHILS 0.1 0.0 - 0.4 K/UL    ABS. BASOPHILS 0.0 0.0 - 0.1 K/UL    ABS. IMM.  GRANS. 0.1 (H) 0.00 - 0.04 K/UL    DF AUTOMATED     URINALYSIS W/ RFLX MICROSCOPIC    Collection Time: 05/20/18 12:14 PM   Result Value Ref Range    Color DARK YELLOW      Appearance CLEAR CLEAR      Specific gravity 1.028 1.003 - 1.030      pH (UA) 6.0 5.0 - 8.0      Protein 30 (A) NEG mg/dL    Glucose NEGATIVE  NEG mg/dL    Ketone TRACE (A) NEG mg/dL    Blood NEGATIVE  NEG      Urobilinogen 0.2 0.2 - 1.0 EU/dL    Nitrites NEGATIVE  NEG      Leukocyte Esterase NEGATIVE  NEG      WBC 0-4 0 - 4 /hpf    RBC 0-5 0 - 5 /hpf    Epithelial cells FEW FEW /lpf    Bacteria 1+ (A) NEG /hpf    CA Oxalate crystals FEW (A) NEG      Hyaline cast 5-10 0 - 5 /lpf   BILIRUBIN, CONFIRM    Collection Time: 05/20/18 12:14 PM   Result Value Ref Range    Bilirubin UA, confirm NEGATIVE  NEG         Radiologic Studies -   CT Results  (Last 48 hours)               05/20/18 1353  CT ABD PELV W CONT Final result    Impression:  IMPRESSION:   Large volume abdominal and pelvic ascites. Mesenteric infiltration left abdomen. Paracentesis with cytology recommended. Narrative:  EXAM:  CT ABD PELV W CONT       INDICATION: Abdominal pain  abdominal pain and bloating for several weeks       COMPARISON: December 22, 2017       CONTRAST:  100 mL of Isovue-370. TECHNIQUE:    Following the uneventful intravenous administration of contrast, thin axial   images were obtained through the abdomen and pelvis. Coronal and sagittal   reconstructions were generated. Oral contrast was not administered. CT dose   reduction was achieved through use of a standardized protocol tailored for this   examination and automatic exposure control for dose modulation. FINDINGS:    LUNG BASES: Clear. INCIDENTALLY IMAGED HEART AND MEDIASTINUM: Unremarkable. LIVER: Unremarkable. GALLBLADDER: Gallbladder sludge. SPLEEN: No mass. PANCREAS: No mass or ductal dilatation. ADRENALS: Unremarkable. KIDNEYS: No mass, calculus, or hydronephrosis. STOMACH: Unremarkable. SMALL BOWEL: No dilatation or wall thickening. COLON: No dilatation or wall thickening. Diverticulosis. APPENDIX: Unremarkable. PERITONEUM: There is mesenteric infiltration in the left upper abdomen. RETROPERITONEUM: No lymphadenopathy or aortic aneurysm. REPRODUCTIVE ORGANS:   URINARY BLADDER: No mass or calculus. BONES: No destructive bone lesion. Multilevel degenerative changes. ADDITIONAL COMMENTS: N/A               CXR Results  (Last 48 hours)               05/20/18 1225  XR ABD ACUTE W 1 V CHEST Final result    Impression:  IMPRESSION: Nonspecific bowel gas pattern. Single dilated bowel loop left upper   quadrant. Narrative:          EXAM:  XR ABD ACUTE W 1 V CHEST       INDICATION:  abd pain and bloating       COMPARISON: None. FINDINGS: The upright chest radiograph demonstrates clear lungs and normal   cardiac and mediastinal contours. There is no pleural effusion or free air under   the diaphragm. Left subclavian leads are stable. Supine and upright views of the abdomen demonstrate a dilated small bowel loop   in the left upper quadrant. There is no free intraperitoneal air. No soft tissue   masses or pathologic calcifications are identified. There are degenerative   changes of the spine. Medical Decision Making   I am the first provider for this patient. I reviewed the vital signs, available nursing notes, past medical history, past surgical history, family history and social history. Vital Signs-Reviewed the patient's vital signs. Patient Vitals for the past 12 hrs:   Temp Pulse Resp BP SpO2   05/20/18 1116 97.5 °F (36.4 °C) 94 18 131/88 100 %       Pulse Oximetry Analysis - 100% on room air    Cardiac Monitor:   Rate: 94 bpm  Rhythm: Normal Sinus Rhythm        Records Reviewed: Nursing Notes, Old Medical Records, Previous Radiology Studies and Previous Laboratory Studies    Provider Notes (Medical Decision Making):   DDx: Ascites found on bedside US. Little concern for SBP.  Cause of ascites can include hypoalbuminemia, heart failure, cirrhosis, portal venous thrombosis, Malignancy. ED Course:   Initial assessment performed. The patients presenting problems have been discussed, and they are in agreement with the care plan formulated and outlined with them. I have encouraged them to ask questions as they arise throughout their visit. Progress Notes:  12:35 PM  Bedside fast US showed ascites. Free fluid all 4 quadrants. 2:05 PM  Called PCP for consult. Dr. Edgar Kramer will call back. 2:16 PM  Spoke will Dr. Edgar Kramer. Will fax medical records over. He will plan for patient to get paracentesis tomorrow. Consult Note:  2:15 PM  Sonian, DO spoke with Dr. Edgar Kramer,  Specialty: Family practice  Discussed pt's hx, disposition, and available diagnostic and imaging results. Reviewed care plans. Consultant agrees with plans as outlined. Will arrange for outpatient f/u. Pt will hold Xarelto tomorrow. Increase Lasix. Start on Spironolactone 25mg. Will fax medical records to Dr. Edgar Kramer. Critical Care Time: 0 minutes       Disposition:  Discharge Note:  2:19 PM  The patient has been re-evaluated and is ready for discharge. Reviewed available results with patient. Counseled patient/parent/guardian on diagnosis and care plan. Patient has expressed understanding, and all questions have been answered. Patient agrees with plan and agrees to follow up as recommended, or return to the ED if their symptoms worsen. Discharge instructions have been provided and explained to the patient, along with reasons to return to the ED. PLAN:  1. Discharge Medication List as of 5/20/2018  2:17 PM      START taking these medications    Details   spironolactone (ALDACTONE) 25 mg tablet Take 1 Tab by mouth daily. , Normal, Disp-30 Tab, R-0      oxyCODONE IR (ROXICODONE) 5 mg immediate release tablet Take 1 Tab by mouth every four (4) hours as needed for Pain.  Max Daily Amount: 30 mg., Print, Disp-6 Tab, R-0         CONTINUE these medications which have CHANGED    Details   furosemide (LASIX) 40 mg tablet Take 1.5 Tabs by mouth daily. , Normal, Disp-30 Tab, R-2         CONTINUE these medications which have NOT CHANGED    Details   methIMAzole (TAPAZOLE) 10 mg tablet Take 20 mg daily--Dose change 5/16/18--updated med list--did not send prescription to the pharmacy, No Print, Disp-180 Tab, R-11      amitriptyline (ELAVIL) 10 mg tablet Take 1-2 Tabs by mouth nightly as needed for Sleep., Historical Med, Disp-100 Tab, R-5      metoprolol succinate (TOPROL-XL) 50 mg XL tablet Take 1 Tab by mouth daily. , Print, Disp-30 Tab, R-2      valsartan (DIOVAN) 80 mg tablet Take 1 Tab by mouth daily. , Print, Disp-30 Tab, R-0      rivaroxaban (XARELTO) 20 mg tab tablet Take 20 mg by mouth daily. , Historical Med           2. Follow-up Information     Follow up With Details Comments Contact Info    Julio Cesar Jurado MD Call in 1 day  [address] [facility]  [phone]      [facility] EMERGENCY DEPT  If symptoms worsen [address]  [address] [facility]  [phone]        Return to ED if worse     Diagnosis     Clinical Impression:   1. Other ascites        Attestations:    Attestation: This note is prepared by Francisco Javier Rankin. Jimmy, acting as Scribe for [employer], DO. [employer], DO: The scribe's documentation has been prepared under my direction and personally reviewed by me in its entirety. I confirm that the note above accurately reflects all work, treatment, procedures, and medical decision making performed by me.

## 2018-05-20 NOTE — ED NOTES
Patient discharged by Dr. Adolfo Santillan. Patient provided with discharge instructions Rx and instructions on follow up care. Patient out of ED ambulatory accompanied by spouse.

## 2018-05-20 NOTE — DISCHARGE INSTRUCTIONS
Ascites: Care Instructions  Your Care Instructions  Ascites (say \"uh-SY-teez\") is a buildup of extra fluid in the belly. It can cause your belly to swell. It can also make it hard for you to breathe. Many diseases can cause ascites. But most people who get it have a liver problem. When the liver gets damaged, it can cause fluid to back up from the liver or from blood vessels. Then this fluid builds up in the belly. Your doctor may take a sample of the fluid in your belly with a thin needle. The sample is then tested to help find out the cause of the ascites. Treatment may include medicine. It may also include changing the way you eat so you don't eat a lot of salt. If your ascites is very bad and hard to treat, your doctor may need to use a needle to take out the fluid. Follow-up care is a key part of your treatment and safety. Be sure to make and go to all appointments, and call your doctor if you are having problems. It's also a good idea to know your test results and keep a list of the medicines you take. How can you care for yourself at home? · Be safe with medicines. Take your medicines exactly as prescribed. Call your doctor if you have any problems with your medicine. You will get more details on the specific medicines your doctor prescribes. · Eat low-salt foods, and don't add salt to your food. If you eat a lot of salt, it's harder to get rid of the extra fluid. Salt is in many prepared foods. These include alejo, canned foods, snack foods, sauces, and soups. Look for products that are low-sodium or have reduced salt. · Do not drink any alcohol until you are all better. Alcohol will damage the liver more. If your liver disease is caused by drinking alcohol, do not drink alcohol at all. Tell your doctor if you need help to quit. Counseling, support groups, and sometimes medicines can help you stay sober. · Talk to your doctor before you take any other medicines.  These include over-the-counter medicines, vitamins, and herbal products. · Make sure your doctor knows all the medicines you take. Some medicines, such as acetaminophen (Tylenol), can make liver problems worse. When should you call for help? Call 911 anytime you think you may need emergency care. For example, call if:  ? · You have trouble breathing. ? · You vomit blood or what looks like coffee grounds. ?Call your doctor now or seek immediate medical care if:  ? · You have new or worse belly pain. ? · You have a fever. ? Watch closely for changes in your health, and be sure to contact your doctor if:  ? · You have any problems. ? · Your belly is getting bigger. ? · You are gaining weight. Where can you learn more? Go to http://tavon-zina.info/. Enter B970 in the search box to learn more about \"Ascites: Care Instructions. \"  Current as of: May 12, 2017  Content Version: 11.4  © 3727-5622 Healthwise, [employer]orporated. Care instructions adapted under license by [employer] (which disclaims liability or warranty for this information). If you have questions about a medical condition or this instruction, always ask your healthcare professional. [address] any warranty or liability for your use of this information.

## 2018-05-22 ENCOUNTER — HOSPITAL ENCOUNTER (OUTPATIENT)
Dept: ULTRASOUND IMAGING | Age: 73
Discharge: HOME | End: 2018-05-22
Attending: SPECIALIST
Payer: MEDICARE

## 2018-05-22 VITALS
TEMPERATURE: 98.3 F | DIASTOLIC BLOOD PRESSURE: 70 MMHG | OXYGEN SATURATION: 100 % | HEIGHT: 76 IN | HEART RATE: 80 BPM | WEIGHT: 216 LBS | BODY MASS INDEX: 26.3 KG/M2 | RESPIRATION RATE: 16 BRPM | SYSTOLIC BLOOD PRESSURE: 115 MMHG

## 2018-05-22 DIAGNOSIS — R93.5 ABNORMAL ABDOMINAL ULTRASOUND: ICD-10-CM

## 2018-05-22 DIAGNOSIS — R18.8 OTHER ASCITES: ICD-10-CM

## 2018-05-22 DIAGNOSIS — Z79.01 LONG TERM (CURRENT) USE OF ANTICOAGULANTS: ICD-10-CM

## 2018-05-22 LAB
ALBUMIN FLD-MCNC: 1.8 G/DL
AMYLASE FLD-CCNC: 15 U/L
APPEARANCE FLD: CLEAR
COLOR FLD: YELLOW
GLUCOSE FLD-MCNC: 113 MG/DL
LYMPHOCYTES NFR FLD: 8 %
MESOTHL CELL NFR FLD: 4 %
MONOS+MACROS NFR FLD: 80 %
NEUTROPHILS NFR FLD: 8 %
NUC CELL # FLD: 343 /CU MM
PROT FLD-MCNC: 3.5 G/DL
RBC # FLD: 5 /CU MM
SPECIMEN SOURCE FLD: ABNORMAL
SPECIMEN SOURCE FLD: NORMAL

## 2018-05-22 PROCEDURE — 82042 OTHER SOURCE ALBUMIN QUAN EA: CPT | Performed by: SPECIALIST

## 2018-05-22 PROCEDURE — 88112 CYTOPATH CELL ENHANCE TECH: CPT | Performed by: SPECIALIST

## 2018-05-22 PROCEDURE — 88305 TISSUE EXAM BY PATHOLOGIST: CPT | Performed by: SPECIALIST

## 2018-05-22 PROCEDURE — 87070 CULTURE OTHR SPECIMN AEROBIC: CPT | Performed by: SPECIALIST

## 2018-05-22 PROCEDURE — 49083 ABD PARACENTESIS W/IMAGING: CPT

## 2018-05-22 PROCEDURE — 84157 ASSAY OF PROTEIN OTHER: CPT | Performed by: SPECIALIST

## 2018-05-22 PROCEDURE — 87075 CULTR BACTERIA EXCEPT BLOOD: CPT | Performed by: SPECIALIST

## 2018-05-22 PROCEDURE — 74011000250 HC RX REV CODE- 250: Performed by: RADIOLOGY

## 2018-05-22 PROCEDURE — 77030039266 HC ADH SKN EXOFIN S2SG -A

## 2018-05-22 PROCEDURE — 89050 BODY FLUID CELL COUNT: CPT | Performed by: SPECIALIST

## 2018-05-22 PROCEDURE — 82945 GLUCOSE OTHER FLUID: CPT | Performed by: SPECIALIST

## 2018-05-22 PROCEDURE — 74011250636 HC RX REV CODE- 250/636: Performed by: SPECIALIST

## 2018-05-22 PROCEDURE — 82150 ASSAY OF AMYLASE: CPT | Performed by: SPECIALIST

## 2018-05-22 PROCEDURE — P9047 ALBUMIN (HUMAN), 25%, 50ML: HCPCS | Performed by: SPECIALIST

## 2018-05-22 RX ORDER — ALBUMIN HUMAN 250 G/1000ML
25 SOLUTION INTRAVENOUS ONCE
Status: COMPLETED | OUTPATIENT
Start: 2018-05-22 | End: 2018-05-22

## 2018-05-22 RX ORDER — LIDOCAINE HYDROCHLORIDE 10 MG/ML
10 INJECTION, SOLUTION EPIDURAL; INFILTRATION; INTRACAUDAL; PERINEURAL
Status: COMPLETED | OUTPATIENT
Start: 2018-05-22 | End: 2018-05-22

## 2018-05-22 RX ADMIN — LIDOCAINE HYDROCHLORIDE 10 ML: 10 INJECTION, SOLUTION EPIDURAL; INFILTRATION; INTRACAUDAL; PERINEURAL at 13:00

## 2018-05-22 RX ADMIN — ALBUMIN (HUMAN) 25 G: 0.25 INJECTION, SOLUTION INTRAVENOUS at 13:30

## 2018-05-22 NOTE — ROUTINE PROCESS
D/c'd. Remains A/O x 3 w/o complaint. Received + verbal feedback from the instructions provided. To wr via w/c. In NAD at time of d/c.

## 2018-05-22 NOTE — ROUTINE PROCESS
Arrived ambulatory into the xray recovery area A/O x 3 w/o complaint. Here today for a paracentesis.

## 2018-05-22 NOTE — DISCHARGE INSTRUCTIONS
[facility]  Special Procedures/Radiology Department      Radiologist:  Dr Elisa Avila    Date: 05/22/2018     Paracentesis Discharge Instructions    You may have an aching pain in your abdomen at the puncture site tonight. You may take Tylenol, as directed on the label, for the pain or discomfort. Resume your previous diet and follow the medication reconciliation form. Rest today. Watch for signs of infection at the puncture site:  redness, swelling, pus, fever or chills. If this occurs, call your doctor immediately or go to the nearest Emergency Room. If you experience severe sweating, severe abdominal pain, dizziness or faintness, go to the nearest Emergency Room immediately. If you have any questions or concerns, please call [phone], and ask to speak to the nurse on-call.

## 2018-05-22 NOTE — IP AVS SNAPSHOT
Summary of Care Report The Summary of Care report has been created to help improve care coordination. Users with access to Video[employer]ie or [facility] (Web-based application) may access additional patient information including the Discharge Summary. If you are not currently a [address] user and need more information, please call the number listed below in the [address] section and ask to be connected with Medical Records. Facility Information Name Address Phone [address] [address] [phone] Patient Information Patient Name Sex  Torres Wilson ([id number]) Male 1945 Discharge Information Admitting Provider Service Area Unit  
 (none) 8 [facility] / [phone] Discharge Provider Discharge Date/Time Discharge Disposition Destination (none) (none) (none) (none) Patient Language Language ENGLISH [13] Hospital Problems as of 2018  Reviewed: 3/8/2018  2:23 PM by Rosalinda Dao MD  
 None Non-Hospital Problems as of 2018  Reviewed: 3/8/2018  2:23 PM by Rosalinda Dao MD  
  
  
  
 Class Noted - Resolved Last Modified Active Problems Broken prosthetic joint implant ([address]  2015 - Present 1/15/2015 Entered by Ellen Evans MD  
  Infection of prosthetic knee joint ([address]  2015 - Present 1/15/2015   Entered by Ellen Evans MD  
  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  
  Entered by Ellen Evans MD  
  DJD (degenerative joint disease) of knee  3/23/2015 - Present 3/23/2015 by Ellen Evans MD  
  Entered by Ellen Evans MD  
  Atrial fibrillation with RVR ([address]  2018 - Present 2018 by Sharyn Stephenson MD  
 Entered by Brent Rivero MD  
  Weight loss  1/12/2018 - Present 1/12/2018 by Brent Rivero MD  
  Entered by Brent Rivero MD  
  Graves disease  1/23/2018 - Present 3/8/2018 by Venkat Lynch MD  
  Entered by Venkat Lynch MD  
  Oropharyngeal dysphagia  2/13/2018 - Present 2/13/2018 by Nabeel Rangel MD  
  Entered by Nabeel Rangel MD  
  Weakness  2/13/2018 - Present 2/13/2018 by Nabeel Rangel MD  
  Entered by Nabeel Rangel MD  
  B12 deficiency  2/13/2018 - Present 2/13/2018 by Nabeel Rangel MD  
  Entered by Nabeel Rangel MD  
  Vitamin D deficiency  2/13/2018 - Present 2/13/2018 by Nabeel Rangel MD  
  Entered by Nabeel Rangel MD  
  Metabolic myopathy  3/43/2259 - Present 2/13/2018 by Nabeel Rangel MD  
  Entered by Nabeel Rangel MD  
  Thyrotoxic myopathy  2/13/2018 - Present 2/13/2018 by Nabeel Rangel MD  
  Entered by Nabeel Rangel MD  
  Atrial fibrillation, persistent ([address]  2/26/2018 - Present 2/26/2018 by Angelia Boyle MD  
  Entered by Angelia Boyle MD  
  
You are allergic to the following No active allergies Current Discharge Medication List  
  
ASK your doctor about these medications Dose & Instructions Dispensing Information Comments  
 amitriptyline 10 mg tablet Commonly known as:  ELAVIL Dose:  10-20 mg Take 1-2 Tabs by mouth nightly as needed for Sleep. Quantity:  100 Tab Refills:  5  
   
 furosemide 40 mg tablet Commonly known as:  LASIX Dose:  60 mg Take 1.5 Tabs by mouth daily. Quantity:  30 Tab Refills:  2  
   
 methIMAzole 10 mg tablet Commonly known as:  TAPAZOLE Take 20 mg daily--Dose change 5/16/18--updated med list--did not send prescription to the pharmacy Quantity:  180 Tab Refills:  11  
   
 metoprolol succinate 50 mg XL tablet Commonly known as:  TOPROL-XL Dose:  50 mg Take 1 Tab by mouth daily. Quantity:  30 Tab Refills:  2 oxyCODONE IR 5 mg immediate release tablet Commonly known as:  Helga Sancho Dose:  5 mg Take 1 Tab by mouth every four (4) hours as needed for Pain. Max Daily Amount: 30 mg.  
 Quantity:  6 Tab Refills:  0  
   
 spironolactone 25 mg tablet Commonly known as:  ALDACTONE Dose:  25 mg Take 1 Tab by mouth daily. Quantity:  30 Tab Refills:  0  
   
 valsartan 80 mg tablet Commonly known as:  DIOVAN Dose:  80 mg Take 1 Tab by mouth daily. Quantity:  30 Tab Refills:  0 XARELTO 20 mg Tab tablet Generic drug:  rivaroxaban Dose:  20 mg Take 20 mg by mouth daily. Refills:  0 Current Immunizations Name Date Influenza Vaccine 10/1/2014 Pneumococcal Vaccine (Unspecified Type) 10/1/2014 Follow-up Information None Discharge Instructions [facility] Special Procedures/Radiology Department Radiologist:  Dr Keo Noel Date: 05/22/2018 Paracentesis Discharge Instructions You may have an aching pain in your abdomen at the puncture site tonight. You may take Tylenol, as directed on the label, for the pain or discomfort. Resume your previous diet and follow the medication reconciliation form. Rest today. Watch for signs of infection at the puncture site:  redness, swelling, pus, fever or chills. If this occurs, call your doctor immediately or go to the nearest Emergency Room. If you experience severe sweating, severe abdominal pain, dizziness or faintness, go to the nearest Emergency Room immediately. If you have any questions or concerns, please call [phone], and ask to speak to the nurse on-call. Chart Review Routing History Recipient Method Report Sent By Leydi Coello MD  
Fax: [phone] Phone: [phone] Fax [facility]ORAC[facility] PRETT[facility] MD NOTES AUTO ROUTING REPORT Em Irvin MD [[id number]] 1/12/2018 10:25 PM 01/12/2018  Wilfred Coello MD  
 Fax: [phone] Phone: [phone] [facility] NOT[facility] AUTO ROUTING REPORT Florentino Chao MD [[id number]] 1/23/2018  5:36 PM 01/23/2018 Esther Louis MD  
Fax: [phone] Phone: [phone] Fax Harjit Chacon MD NOTES AUTO ROUTING REPORT Melody Ayala MD [phone] 2/26/2018  9:43 PM 02/26/2018 Esther Louis MD  
Fax: [phone] Phone: [phone] Fax Harjit Chacon MD NOTES AUTO ROUTING REPORT Melody Ayala MD [phone] 2/28/2018  9:18 AM 02/28/2018 Esther Louis MD  
Fax: [phone] Phone: [phone] Fax Notes Report Tonio Vee MD [[id number]] 3/8/2018  3:07 PM 3/8/2018 Melody Ayala MD  
Fax: [phone] Phone: [phone] Fax Notes Report Tonio Vee MD [[id number]] 3/8/2018  3:07 PM 3/8/2018 Melody Ayala MD  
Fax: [phone] Phone: [phone] Fax Notes Report Tonio Vee MD [[id number]] 5/16/2018  8:24 PM 5/16/2018 Carolina Qureshi MD  
Fax: [phone] Phone: [phone] Fax Notes Report Tonio Vee MD [[id number]] 5/21/2018  2:28 PM 5/21/2018

## 2018-05-26 LAB
BACTERIA SPEC CULT: NORMAL
GRAM STN SPEC: NORMAL
GRAM STN SPEC: NORMAL
SERVICE CMNT-IMP: NORMAL
SERVICE CMNT-IMP: NORMAL

## 2018-05-29 ENCOUNTER — OFFICE VISIT (OUTPATIENT)
Dept: SURGERY | Age: 73
End: 2018-05-29

## 2018-05-29 VITALS
OXYGEN SATURATION: 99 % | SYSTOLIC BLOOD PRESSURE: 121 MMHG | WEIGHT: 194 LBS | HEIGHT: 76 IN | DIASTOLIC BLOOD PRESSURE: 83 MMHG | HEART RATE: 86 BPM | BODY MASS INDEX: 23.62 KG/M2 | RESPIRATION RATE: 16 BRPM | TEMPERATURE: 97.4 F

## 2018-05-29 DIAGNOSIS — R18.8 OTHER ASCITES: Primary | ICD-10-CM

## 2018-05-29 NOTE — MR AVS SNAPSHOT
[address] [facility], [address] 
[phone] Patient: Benedict Dillon MRN: [id number] VPI:5/55/0077 Visit Information Date & Time Provider Department Dept. Phone Encounter #  
 5/29/2018 10:40 AM Shae Concepcion MD [facility] [place] [id number] Your Appointments 7/24/2018  2:10 PM  
Follow Up with MD [facility] [address] [place] [address]) Appt Note: 4 month f/u   [facility] [employer] [address] [id number] [address] [place] [address] Upcoming Health Maintenance Date Due Hepatitis C Screening 1945 DTaP/Tdap/Td series (1 - Tdap) 5/24/1967 FOBT Q 1 YEAR AGE 50-75 5/24/1996 ZOSTER VACCINE AGE 60> 3/24/2006 GLAUCOMA SCREENING Q2Y 5/24/2011 MEDICARE YEARLY EXAM 3/14/2018 Influenza Age 5 to Adult 8/1/2018 Pneumococcal 65+ Low/Medium Risk (2 of 2 - PPSV23) 10/1/2019 Allergies as of 5/29/2018  Review Complete On: 5/29/2018 By: Maria Victoria Mojica LPN No Known Allergies Current Immunizations  Reviewed on 2/27/2015 Name Date Influenza Vaccine 10/1/2014 Pneumococcal Vaccine (Unspecified Type) 10/1/2014 Not reviewed this visit Vitals BP Pulse Temp Resp Height(growth percentile) Weight(growth percentile) 121/83 (BP 1 Location: Left arm, BP Patient Position: Sitting) 86 97.4 °F (36.3 °C) (Oral) 16 6' 4\" (1.93 m) 194 lb (88 kg) SpO2 BMI Smoking Status 99% 23.61 kg/m2 Former Smoker BMI and BSA Data Body Mass Index Body Surface Area  
 23.61 kg/m 2 2.17 m 2 Preferred Pharmacy Pharmacy Name Phone [id number] [place], [facility] [phone] Your Updated Medication List  
  
   
 This list is accurate as of 5/29/18 11:47 AM.  Always use your most recent med list.  
  
  
  
  
 amitriptyline 10 mg tablet Commonly known as:  ELAVIL Take 1-2 Tabs by mouth nightly as needed for Sleep.  
  
 furosemide 40 mg tablet Commonly known as:  LASIX Take 1.5 Tabs by mouth daily. methIMAzole 10 mg tablet Commonly known as:  TAPAZOLE Take 20 mg daily--Dose change 5/16/18--updated med list--did not send prescription to the pharmacy  
  
 metoprolol succinate 50 mg XL tablet Commonly known as:  TOPROL-XL Take 1 Tab by mouth daily. oxyCODONE IR 5 mg immediate release tablet Commonly known as:  Claressa Dills Take 1 Tab by mouth every four (4) hours as needed for Pain. Max Daily Amount: 30 mg.  
  
 spironolactone 25 mg tablet Commonly known as:  ALDACTONE Take 1 Tab by mouth daily. valsartan 80 mg tablet Commonly known as:  DIOVAN Take 1 Tab by mouth daily. XARELTO 20 mg Tab tablet Generic drug:  rivaroxaban Take 20 mg by mouth daily. Introducing [facility]! Dear Florencio Holguin: Thank you for requesting a Mobile [employer]ie account. Our records indicate that you already have an active Mobile [employer] account. You can access your account anytime at [email or web address] Did you know that you can access your hospital and ER discharge instructions at any time in Mobile Roadie? You can also review all of your test results from your hospital stay or ER visit. Additional Information If you have questions, please visit the Frequently Asked Questions section of the Mobile [employer]ie website at [email or web address] Remember, Mobile Roadie is NOT to be used for urgent needs. For medical emergencies, dial 911. Now available from your iPhone and Android! Please provide this summary of care documentation to your next provider. Your primary care clinician is listed as Temo Paige.  If you have any questions after today's visit, please call [phone].

## 2018-05-29 NOTE — PROGRESS NOTES
Surgery Consult:  ascites  Requesting physician:  Dr. Ivanna Ordonez    Subjective:   Patient 68 y.o.  male s/p Lap hand-assisted left colectomy on 1/18/18 by Dr. Barby Martinez for left colon mass presents with ascites. 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 Since the surgery, patient reports progressive abdominal distension with discomfort. No nausea or vomiting. No F/C/S. Patient has been steadily losing weight even before the surgery. Patient was seen in ER on 5/20/18 and underwent CT scan with large volume ascites and mesenteric infiltration of the left abdomen. Patient subsequently underwent paracentesis on 5/22/18 and drained 1290 cc of clear yellow fluid. Fluid amylase was 15; fluid protein was 3.5. Cytology of the fluid returned benign and reactive mesothelial cells. LFTs from 5/20/18 were unremarkable except for mildly elevated . Patient was seen by Dr. Ivanna Ordonez and was felt that fluid is not consistent with portal HTN and was sent to our clinic for possible mesenteric biopsy. Of note, patient is currently on Lasix 40mg daily. Patient was given Rx for Spironolactone in the ER, but patient hasn't filled it.       Past Medical & Surgical History:  Past Medical History:   Diagnosis Date    Arrhythmia     afib    Arthritis     Atrial fibrillation ([address] Dx 5/19/14    Dr Palomino Mention [phone]    Hypertension     Hyperthyroidism 1/23/2018    Hypertrophy (benign) of prostate     Thromboembolus ([address] 2003    s/p Lt knee surgery (was on Coumadin x3 yr)      Past Surgical History:   Procedure Laterality Date    COLONOSCOPY N/A 1/15/2018    [facility]  1/15/2018         COLONOSCOPY,MILAGROS [address]  1/15/2018         HX ORTHOPAEDIC      back surgery no hardware    HX ORTHOPAEDIC  2014    right knee surgery arthroscopy    HX ORTHOPAEDIC  3/23/15    REVISION TO LEFT TOTAL KNEE REPLACEMENT    HX PACEMAKER  8/2014    3 wire    TOTAL KNEE ARTHROPLASTY  2003    Left    TOTAL KNEE ARTHROPLASTY  2009    Right    UPPER GI ENDOSCOPY,BIOPSY  1/15/2018            Social History:  Social History     Social History    Marital status:      Spouse name: N/A    Number of children: N/A    Years of education: N/A     Occupational History    Not on file. Social History Main Topics    Smoking status: Former Smoker     Packs/day: 1.00     Years: 30.00     Quit date: 5/23/2004    Smokeless tobacco: Never Used    Alcohol use No      Comment: no alcohol for the pask 6 months    Drug use: No    Sexual activity: Not on file     Other Topics Concern    Not on file     Social History Narrative        Family History:  Family History   Problem Relation Age of Onset    Cancer Mother      throat cancer    COPD Mother     Cancer Father      kidney cancer    Other Father      neck fx and paraplegia    Hypertension Sister     Arthritis-osteo Sister     Stroke Child         Medications:  Current Outpatient Prescriptions   Medication Sig    furosemide (LASIX) 40 mg tablet Take 1.5 Tabs by mouth daily. (Patient taking differently: Take 40 mg by mouth daily.)    spironolactone (ALDACTONE) 25 mg tablet Take 1 Tab by mouth daily.  methIMAzole (TAPAZOLE) 10 mg tablet Take 20 mg daily--Dose change 5/16/18--updated med list--did not send prescription to the pharmacy    metoprolol succinate (TOPROL-XL) 50 mg XL tablet Take 1 Tab by mouth daily.  valsartan (DIOVAN) 80 mg tablet Take 1 Tab by mouth daily.  rivaroxaban (XARELTO) 20 mg tab tablet Take 20 mg by mouth daily.  oxyCODONE IR (ROXICODONE) 5 mg immediate release tablet Take 1 Tab by mouth every four (4) hours as needed for Pain. Max Daily Amount: 30 mg.    amitriptyline (ELAVIL) 10 mg tablet Take 1-2 Tabs by mouth nightly as needed for Sleep. No current facility-administered medications for this visit.         Allergies:  No Known Allergies    Review of Systems  A comprehensive review of systems was negative except for that written in the HPI. Objective:     Exam:    Visit Vitals    /83 (BP 1 Location: Left arm, BP Patient Position: Sitting)    Pulse 86    Temp 97.4 °F (36.3 °C) (Oral)    Resp 16    Ht 6' 4\" (1.93 m)    Wt 88 kg (194 lb)    SpO2 99%    BMI 23.61 kg/m2     General appearance: alert, cooperative, no distress, appears stated age  Eyes: no sclera icterus  Lungs: clear to auscultation bilaterally  Heart: regular rate and rhythm  Abdomen: soft, non-tender. Distended with fluid shift. Well healed incisions. Extremities: extremities atraumatic, no cyanosis or edema. NILDA. Skin: Skin color, texture, turgor normal. No rashes or lesions. No jaundice. Neurologic: Grossly normal    Assessment:     Ascites ? due to lymphatic leak from recent surgery. Patient technically had mesenteric biopsy with his colon resection in Jan 2018. Plan:     Recommend checking fluid Triglyceride next time he needs paracentesis. Recommend conservative Rx for ascites for now. F/u with GI.  F/u CT scan in 6 months  Plan for diagnostic laparoscopy if conservative Rx fail.

## 2018-05-29 NOTE — PROGRESS NOTES
Chief Complaint   Patient presents with    Skin Problem     Seen at the request of Dr. Epifanio Shahid. 1. Have you been to the ER, urgent care clinic since your last visit? Hospitalized since your last visit? ED [facility], 5/20/18. 2. Have you seen or consulted any other health care providers outside of the [address] [place] [address] since your last visit? Include any pap smears or colon screening.  No

## 2018-05-30 NOTE — PROGRESS NOTES
Pre call completed with patient regarding upcoming procedure. Patient states his LD of Xarelto was 5/28.

## 2018-05-31 ENCOUNTER — HOSPITAL ENCOUNTER (OUTPATIENT)
Dept: ULTRASOUND IMAGING | Age: 73
Discharge: HOME | End: 2018-05-31
Attending: PHYSICIAN ASSISTANT
Payer: MEDICARE

## 2018-05-31 VITALS
HEART RATE: 80 BPM | BODY MASS INDEX: 23.62 KG/M2 | HEIGHT: 76 IN | TEMPERATURE: 98.7 F | OXYGEN SATURATION: 100 % | WEIGHT: 194 LBS | DIASTOLIC BLOOD PRESSURE: 68 MMHG | SYSTOLIC BLOOD PRESSURE: 85 MMHG | RESPIRATION RATE: 20 BRPM

## 2018-05-31 DIAGNOSIS — R13.10 DYSPHAGIA: ICD-10-CM

## 2018-05-31 DIAGNOSIS — R63.4 LOSS OF WEIGHT: ICD-10-CM

## 2018-05-31 DIAGNOSIS — Z12.11 SPECIAL SCREENING FOR MALIGNANT NEOPLASMS, COLON: ICD-10-CM

## 2018-05-31 DIAGNOSIS — R13.10 PROBLEMS WITH SWALLOWING AND MASTICATION: ICD-10-CM

## 2018-05-31 DIAGNOSIS — R63.0 ANOREXIA: ICD-10-CM

## 2018-05-31 DIAGNOSIS — Z79.01 LONG TERM (CURRENT) USE OF ANTICOAGULANTS: ICD-10-CM

## 2018-05-31 DIAGNOSIS — R93.5 ABNORMAL US (ULTRASOUND) OF ABDOMEN: ICD-10-CM

## 2018-05-31 DIAGNOSIS — R94.5 NONSPECIFIC ABNORMAL RESULTS OF LIVER FUNCTION STUDY: ICD-10-CM

## 2018-05-31 LAB
APPEARANCE FLD: CLEAR
COLOR FLD: YELLOW
LYMPHOCYTES NFR FLD: 5 %
MONOS+MACROS NFR FLD: 81 %
NEUTROPHILS NFR FLD: 14 %
NUC CELL # FLD: 224 /CU MM
RBC # FLD: 59 /CU MM
SPECIMEN SOURCE FLD: ABNORMAL

## 2018-05-31 PROCEDURE — 49083 ABD PARACENTESIS W/IMAGING: CPT

## 2018-05-31 PROCEDURE — P9047 ALBUMIN (HUMAN), 25%, 50ML: HCPCS | Performed by: RADIOLOGY

## 2018-05-31 PROCEDURE — 74011000250 HC RX REV CODE- 250: Performed by: RADIOLOGY

## 2018-05-31 PROCEDURE — 89050 BODY FLUID CELL COUNT: CPT | Performed by: PHYSICIAN ASSISTANT

## 2018-05-31 PROCEDURE — 87186 SC STD MICRODIL/AGAR DIL: CPT | Performed by: PHYSICIAN ASSISTANT

## 2018-05-31 PROCEDURE — 87075 CULTR BACTERIA EXCEPT BLOOD: CPT | Performed by: PHYSICIAN ASSISTANT

## 2018-05-31 PROCEDURE — 87077 CULTURE AEROBIC IDENTIFY: CPT | Performed by: PHYSICIAN ASSISTANT

## 2018-05-31 PROCEDURE — 88305 TISSUE EXAM BY PATHOLOGIST: CPT | Performed by: PHYSICIAN ASSISTANT

## 2018-05-31 PROCEDURE — 88112 CYTOPATH CELL ENHANCE TECH: CPT | Performed by: PHYSICIAN ASSISTANT

## 2018-05-31 PROCEDURE — 74011250636 HC RX REV CODE- 250/636: Performed by: RADIOLOGY

## 2018-05-31 PROCEDURE — 87205 SMEAR GRAM STAIN: CPT | Performed by: PHYSICIAN ASSISTANT

## 2018-05-31 RX ORDER — LIDOCAINE HYDROCHLORIDE 10 MG/ML
5 INJECTION, SOLUTION EPIDURAL; INFILTRATION; INTRACAUDAL; PERINEURAL
Status: COMPLETED | OUTPATIENT
Start: 2018-05-31 | End: 2018-05-31

## 2018-05-31 RX ORDER — ALBUMIN HUMAN 250 G/1000ML
25 SOLUTION INTRAVENOUS ONCE
Status: COMPLETED | OUTPATIENT
Start: 2018-05-31 | End: 2018-05-31

## 2018-05-31 RX ADMIN — LIDOCAINE HYDROCHLORIDE 5 ML: 10 INJECTION, SOLUTION EPIDURAL; INFILTRATION; INTRACAUDAL; PERINEURAL at 16:00

## 2018-05-31 RX ADMIN — ALBUMIN (HUMAN) 25 G: 0.25 INJECTION, SOLUTION INTRAVENOUS at 15:03

## 2018-05-31 NOTE — DISCHARGE INSTRUCTIONS
[address]  Special Procedures/Radiology Department      Radiologist:  Dr. Chapman Phoenix  5/31/2018  Date:      Paracentesis Discharge Instructions    You may have an aching pain in your abdomen at the puncture site tonight. You may take Tylenol, as directed on the label, for the pain or discomfort. Resume your previous diet and follow the medication reconciliation form. Rest today. Watch for signs of infection at the puncture site:  redness, swelling, pus, fever or chills. If this occurs, call your doctor immediately or go to the nearest Emergency Room. If you experience severe sweating, severe abdominal pain, dizziness or faintness, go to the nearest Emergency Room immediately. If you have any questions or concerns, please call [phone], and ask to speak to the nurse on-call.     Other:

## 2018-05-31 NOTE — IP AVS SNAPSHOT
[address] [place] [address] 
[phone] Patient: Skye Flores MRN: [id number] RFT:5/50/0666 About your hospitalization You were admitted on:  May 31, 2018 You last received care in the:  [facility] Ultrasound You were discharged on:  May 31, 2018 Why you were hospitalized Your primary diagnosis was:  Not on File Follow-up Information None Your Scheduled Appointments Tuesday July 24, 2018  2:10 PM EDT Follow Up with MD Elvin Arcos Diabetes and Endocrinology [facility] [employer] [address] [id number] [phone] Discharge Orders None A check jose a indicates which time of day the medication should be taken. My Medications ASK your doctor about these medications Instructions Each Dose to Equal  
 Morning Noon Evening Bedtime  
 amitriptyline 10 mg tablet Commonly known as:  ELAVIL Your last dose was: Your next dose is: Take 1-2 Tabs by mouth nightly as needed for Sleep.  
 10-20 mg  
    
   
   
   
  
 furosemide 40 mg tablet Commonly known as:  LASIX Your last dose was: Your next dose is: Take 1.5 Tabs by mouth daily. 60 mg  
    
   
   
   
  
 methIMAzole 10 mg tablet Commonly known as:  TAPAZOLE Your last dose was: Your next dose is: Take 20 mg daily--Dose change 5/16/18--updated med list--did not send prescription to the pharmacy  
     
   
   
   
  
 metoprolol succinate 50 mg XL tablet Commonly known as:  TOPROL-XL Your last dose was: Your next dose is: Take 1 Tab by mouth daily. 50 mg  
    
   
   
   
  
 oxyCODONE IR 5 mg immediate release tablet Commonly known as:  Churchill Allegra Your last dose was: Your next dose is: Take 1 Tab by mouth every four (4) hours as needed for Pain. Max Daily Amount: 30 mg.  
 5 mg  
    
   
   
   
  
 spironolactone 25 mg tablet Commonly known as:  ALDACTONE Your last dose was: Your next dose is: Take 1 Tab by mouth daily. 25 mg  
    
   
   
   
  
 valsartan 80 mg tablet Commonly known as:  DIOVAN Your last dose was: Your next dose is: Take 1 Tab by mouth daily. 80 mg XARELTO 20 mg Tab tablet Generic drug:  rivaroxaban Your last dose was: Your next dose is: Take 20 mg by mouth daily. 20 mg Opioid Education Prescription Opioids: What You Need to Know: 
 
 
Paracentesis Discharge Instructions You may have an aching pain in your abdomen at the puncture site tonight. You may take Tylenol, as directed on the label, for the pain or discomfort. Resume your previous diet and follow the medication reconciliation form. Rest today. Watch for signs of infection at the puncture site:  redness, swelling, pus, fever or chills. If this occurs, call your doctor immediately or go to the nearest Emergency Room. If you experience severe sweating, severe abdominal pain, dizziness or faintness, go to the nearest Emergency Room immediately. If you have any questions or concerns, please call [phone], and ask to speak to the nurse on-call. Other: 
 
 
  
  
  
Introducing [facility] HEALTH SERVICES! Dear Taco Rhodes: Thank you for requesting a [employer] account. Our records indicate that you already have an active [employer] account. You can access your account anytime at [email or web address] Did you know that you can access your hospital and ER discharge instructions at any time in [employer]? You can also review all of your test results from your hospital stay or ER visit. Additional Information If you have questions, please visit the Frequently Asked Questions section of the [employer] website at [email or web address] Remember, [employer] is NOT to be used for urgent needs. For medical emergencies, dial 911. Now available from your iPhone and Android! Introducing Rupert Najera As a [facility] patient, I wanted to make you aware of our electronic visit tool called Rupert Najera. [employer] allows you to connect within minutes with a medical provider 24 hours a day, seven days a week via a mobile device or tablet or logging into a secure website from your computer. You can access Rupert Najera from anywhere in the [place]. A virtual visit might be right for you when you have a simple condition and feel like you just dont want to get out of bed, or cant get away from work for an appointment, when your regular [facility] provider is not available (evenings, weekends or holidays), or when youre out of town and need minor care. Electronic visits cost only $49 and if the [employer] provider determines a prescription is needed to treat your condition, one can be electronically transmitted to a nearby pharmacy*. Please take a moment to enroll today if you have not already done so. The enrollment process is free and takes just a few minutes. To enroll, please download the [employer] ernestine to your tablet or phone, or visit [email or web address] to enroll on your computer. And, as an [address] [place] [address] patient with a [employer] account, the results of your visits will be scanned into your electronic medical record and your primary care provider will be able to view the scanned results. We urge you to continue to see your regular [employer] provider for your ongoing medical care. And while your primary care provider may not be the one available when you seek a Rupert Najera virtual visit, the peace of mind you get from getting a real diagnosis real time can be priceless. For more information on Rupert Velozfin, view our Frequently Asked Questions (FAQs) at [email or web address]. Sincerely, 
 
Svetlana Carlos MD 
Chief Medical Officer [employer] *:  certain medications cannot be prescribed via [employer] Unresulted Labs-Please follow up with your PCP about these lab tests Order Current Status US GUIDE PARACENTESIS In process Providers Seen During Your Hospitalization Provider Specialty Primary office phone Refanamaria Barlow, [address] [facility] [phone] Your Primary Care Physician (PCP) Primary Care Physician Office Phone Office Fax Demetris Ortiz, [address] [phone] You are allergic to the following No active allergies Recent Documentation Height Weight BMI Smoking Status 1.93 m 88 kg 23.61 kg/m2 Former Smoker Emergency Contacts Name Discharge Info Relation Home Work Mobile Mary Anne Hugo DISCHARGE CAREGIVER [3] Spouse [3] [phone] Patient Belongings The following personal items are in your possession at time of discharge: 
                             
 
  
  
 Please provide this summary of care documentation to your next provider. Signatures-by signing, you are acknowledging that this After Visit Summary has been reviewed with you and you have received a copy.   
  
 
  
    
    
 Patient Signature: ____________________________________________________________ Date:  ____________________________________________________________  
  
[facility] Provider Signature:  ____________________________________________________________ Date:  ____________________________________________________________

## 2018-06-01 ENCOUNTER — TELEPHONE (OUTPATIENT)
Dept: ENDOCRINOLOGY | Age: 73
End: 2018-06-01

## 2018-06-01 RX ORDER — METHIMAZOLE 10 MG/1
TABLET ORAL
Qty: 180 TAB | Refills: 11
Start: 2018-06-01 | End: 2018-07-05

## 2018-06-01 NOTE — TELEPHONE ENCOUNTER
Contacted pt to inform message from Dr. Albino Gracia. Patient x1 id verified. Pt verbalized understanding.

## 2018-06-01 NOTE — TELEPHONE ENCOUNTER
----- Message from Jasmina Blandon sent at 6/1/2018  2:10 PM EDT -----  Regarding: Dr. Lida Shaffer  The patient is requesting a call back from the doctor to discuss his thyroid.  (v)[phone]

## 2018-06-01 NOTE — TELEPHONE ENCOUNTER
Have him draw his labs on Monday. The order is in the computer so we can review the results next Friday. Have him decrease his methimazole to one tab daily until he comes back next Friday.

## 2018-06-01 NOTE — TELEPHONE ENCOUNTER
Returned pt's call. Patient x1 id verified. Pt stated that he had Fluid Drained on 05/22/18 : 13 Liter and 05/31/18 : 71/2- 8 Liter    Pt stated that he also experience SOB and \"talked to his heart doctor and said nothing's wrong with it\"    Pt would like to know if any changes needs to be made to his thyroid medication (Methimazole).

## 2018-06-05 ENCOUNTER — TELEPHONE (OUTPATIENT)
Dept: ENDOCRINOLOGY | Age: 73
End: 2018-06-05

## 2018-06-05 ENCOUNTER — TELEPHONE (OUTPATIENT)
Dept: SURGERY | Age: 73
End: 2018-06-05

## 2018-06-05 LAB
BACTERIA SPEC CULT: NORMAL
GRAM STN SPEC: NORMAL
GRAM STN SPEC: NORMAL
SERVICE CMNT-IMP: NORMAL
T4 FREE SERPL-MCNC: 0.57 NG/DL (ref 0.82–1.77)
TSH SERPL DL<=0.005 MIU/L-ACNC: 63.9 UIU/ML (ref 0.45–4.5)

## 2018-06-05 NOTE — TELEPHONE ENCOUNTER
Patient received a call from Dr. Soren Burton and said his thyroid numbers were extremely high. Patient needs to know if his CT scan needs to be put off? Please return call today.

## 2018-06-05 NOTE — TELEPHONE ENCOUNTER
Called and spoke to him and told him to stop his methimazole completely until he comes to see me on Friday. He said he is having a lot of shortness of breath and I told him that it's because he has become very hypothyroid from the methimazole. We will discuss a plan of what to do with his meds when I see him on Friday. he voiced understanding of this plan.

## 2018-06-06 LAB
BACTERIA SPEC CULT: ABNORMAL
BACTERIA SPEC CULT: ABNORMAL
SERVICE CMNT-IMP: ABNORMAL

## 2018-06-08 ENCOUNTER — OFFICE VISIT (OUTPATIENT)
Dept: ENDOCRINOLOGY | Age: 73
End: 2018-06-08

## 2018-06-08 ENCOUNTER — TELEPHONE (OUTPATIENT)
Dept: ENDOCRINOLOGY | Age: 73
End: 2018-06-08

## 2018-06-08 VITALS
HEART RATE: 80 BPM | DIASTOLIC BLOOD PRESSURE: 67 MMHG | SYSTOLIC BLOOD PRESSURE: 87 MMHG | HEIGHT: 76 IN | BODY MASS INDEX: 22.87 KG/M2 | WEIGHT: 187.8 LBS

## 2018-06-08 DIAGNOSIS — E05.00 GRAVES DISEASE: Primary | ICD-10-CM

## 2018-06-08 RX ORDER — LEVOTHYROXINE SODIUM 50 UG/1
50 TABLET ORAL
Qty: 28 TAB | Refills: 0 | COMMUNITY
Start: 2018-06-08 | End: 2018-06-08 | Stop reason: SDUPTHER

## 2018-06-08 RX ORDER — LEVOTHYROXINE SODIUM 50 UG/1
50 TABLET ORAL
Qty: 28 TAB | Refills: 0 | Status: SHIPPED | COMMUNITY
Start: 2018-06-08 | End: 2018-07-05 | Stop reason: DRUGHIGH

## 2018-06-08 NOTE — TELEPHONE ENCOUNTER
Please call Dr. Cecily Vitale, Dr. Moose Magallanes and Dr. Casi Pineda office to confirm receipt of my office note that was electronically faxed.

## 2018-06-08 NOTE — PROGRESS NOTES
Chief Complaint   Patient presents with    Thyroid Problem     pcp an pharmacy confirmed     History of Present Illness: Maria Gomez is a 68 y.o. male here for follow up of thyroid. Weight up 7 lbs since last visit in 3/18. Since that visit, he has developed increased shortness of breath and went to the ER on 5/20/18 and underwent CT scan that showed large volume ascites and mesenteric infiltration of the left abdomen. Patient subsequently underwent paracentesis on 5/22/18 with Dr. Harini Parham and drained 1290 cc of clear yellow fluid that came back as an exudate and was sent to Dr. Humphrey Iyer on 5/29/18 for possible mesenteric biopsy. She recommended conservative measures with diuretics and decided to hold on biopsy for now. He was started on spironolactone in addition to the lasix. Underwent a 2nd paracentesis of 7.6 L on 5/31/18. Has been seen by Cordell Hu, NP with Dr. Carlos Queen and Dr. Carolina Vizcarra and plan is for repeat ECHO next week. During this time he and I have been in contact about whether his thyroid condition could be contributing to his new onset ascites and I explained that because he has become severely hypothyroid from the methimazole that this is a possibility. I have decreased him from 60 mg of MMI in 3/18 to 40 mg in 4/18 to 20 mg in 5/18 and 10 mg as of 6/1/18 but asked him to stop the MMI on 6/5/18 after his most recent TSH came back. He is having a lot of fatigue and constipation and cold intolerance and dry skin. Remains off amiodarone. Current Outpatient Prescriptions   Medication Sig    furosemide (LASIX) 40 mg tablet Take 1.5 Tabs by mouth daily. (Patient taking differently: Take 40 mg by mouth daily.)    spironolactone (ALDACTONE) 25 mg tablet Take 1 Tab by mouth daily.  metoprolol succinate (TOPROL-XL) 50 mg XL tablet Take 1 Tab by mouth daily.  valsartan (DIOVAN) 80 mg tablet Take 1 Tab by mouth daily.  rivaroxaban (XARELTO) 20 mg tab tablet Take 20 mg by mouth daily.  methIMAzole (TAPAZOLE) 10 mg tablet Take 10 mg daily--Dose change 6/1/18--updated med list--did not send prescription to the pharmacy     No current facility-administered medications for this visit. No Known Allergies     Review of Systems:  - Cardiovascular: no chest pain  - Neurological: no tremors  - Integumentary: skin is dry    Physical Examination:  Blood pressure (!) 87/67, pulse 80, height 6' 4\" (1.93 m), weight 187 lb 12.8 oz (85.2 kg). - General: pleasant, no distress, good eye contact   - Neck: small goiter, no thyroid bruits  - Cardiovascular: regular, normal rate, nl s1 and s2, no m/r/g   - Respiratory: clear to auscultation bilaterally   - GI: soft but protuberant with (+) fluid wave  - Integumentary: skin is dry, no edema  - Neurological: reflexes 2+ at biceps, no tremors  - Psychiatric: normal mood and affect    Data Reviewed:   Component      Latest Ref Rng & Units 6/4/2018 6/4/2018          11:56 AM 11:56 AM   TSH      0.450 - 4.500 uIU/mL  63.900 (H)   T4, Free      0.82 - 1.77 ng/dL 0.57 (L)        Assessment/Plan:     1. Grave's disease: Was seen for hospital consultation in Jan 2018 and his TSH was < 0.01 and FT4 was > 8.0 and T3 was 229 and TRAb was 1.98. I initially started him on methimazole 20 mg bid but he called our office 2 weeks later and was not feeling better so I increased his dose to 30 mg bid and his TSH was still < 0.006 but FT4 down to 2.83 and T3 normal at 115 in 3/18. I explained that the TSH takes longer to normalize so I kept his dose the same. TSH up to 9.67 and FT4 down to 1.17 on 4/17/18 so decreased dose to 20 mg bid. Repeat TSH up to 43 and FT4 down to 0.88 in 5/18 so cut back to 20 mg daily. Called us on 6/1/18 with continued shortness of breath and I recommended decreasing to 10 mg daily until he had labs drawn on 6/4/18 that showed his TSH up to 63 and FT4 down to 0.57 and I had him stop the methimazole the next day.   I'm wondering whether the amiodarone that he was on until 2/18 could have been precipitating some of the hyperthyroidism and now that he has been off this the past 3 months, if he became very sensitive to the methimazole and this has led to the profound hypothyroidism that is the likely cause of a lot of his symptoms including the dyspnea and new onset ascites. I will keep him off the methimazole and instead begin a course of unithroid 50 mcg at bedtime to see if I can bring his TSH down and FT4 up more quickly to help him feel better and he is agreeable to this plan. I would strongly recommend holding on any further GI or cardiac evaluation until his thyroid is regulated in case this is the cause of all his symptoms. We spent 30 minutes of face to face time together and > 50% of the time was spent in counseling regarding management of his thyroid. - stay off  methimazole   - begin unithroid 50 mcg at bedtime  - check TSH and free T4 in 2 weeks and prior to next visit       Patient Instructions   1) Your TSH is a thyroid test.  Your level is 63 which is very high and above goal of 0.5-2.0. This test goes opposite of your thyroid dose and suggests your dose of methimazole is way more than you need and you have become HYPOthyroid (slow metabolism--opposite of hyperthyroidism which is why I put you on methimazole in the first place). Please stay off methimazole. 2) For the next 2 weeks, I will begin unithroid 50 mcg 1 tab at bedtime with just water and no other food or pills at the same time. This medication will start to increase your metabolism to hopefully help with energy and weight and breathing and dry skin and feeling cold all the time. 3) Plan on repeating your labs on Thursday June 21st and I'll have the results back the next morning and I'll be in touch about whether to continue this dose or stop it or change it.   Then go again 2-3 days prior to your visit on 7/5/18.    4) I really think a lot of your symptoms are due to your thyroid flipping from hyper to hypothyroidism and will send this note to all your doctors today. Follow-up Disposition:  Return for 7/5/18 at 1:50pm.    Copy sent to: Chacorta Ford MD as PCP - General (Family Practice)  Thersa Skiff, MD (Cardiology)  Anthony Perera MD (Gastroenterology)  Olga Frye MD as Surgeon (General Surgery)    Lab follow up: 6/22/18  Component      Latest Ref Rng & Units 6/21/2018 6/21/2018          11:03 AM 11:03 AM   T4, Free      0.82 - 1.77 ng/dL  1.10   TSH      0.450 - 4.500 uIU/mL 31.320 (H)      Sent him the following message through [employer]:  Your TSH is still high at 31 but is down from 63 and your free T4 is already back to normal.  It takes longer for the TSH to come back to normal (usually about 4-6 weeks)  Therefore since your free T4 is already back to normal, I will have you stay on the 50 mcg unithroid tabs until you come back to see me and have your labs repeated 2 days prior to your next visit with me on July 5th.   I hope you are starting to feel better with having your thyroid levels coming back towards normal.

## 2018-06-08 NOTE — PATIENT INSTRUCTIONS
1) Your TSH is a thyroid test.  Your level is 63 which is very high and above goal of 0.5-2.0. This test goes opposite of your thyroid dose and suggests your dose of methimazole is way more than you need and you have become HYPOthyroid (slow metabolism--opposite of hyperthyroidism which is why I put you on methimazole in the first place). Please stay off methimazole. 2) For the next 2 weeks, I will begin unithroid 50 mcg 1 tab at bedtime with just water and no other food or pills at the same time. This medication will start to increase your metabolism to hopefully help with energy and weight and breathing and dry skin and feeling cold all the time. 3) Plan on repeating your labs on Thursday June 21st and I'll have the results back the next morning and I'll be in touch about whether to continue this dose or stop it or change it. Then go again 2-3 days prior to your visit on 7/5/18.    4) I really think a lot of your symptoms are due to your thyroid flipping from hyper to hypothyroidism and will send this note to all your doctors today.

## 2018-06-08 NOTE — MR AVS SNAPSHOT
[facility] [address] [address] [phone] Patient: Brian Stewart MRN: [id number] OXD:2/74/8462 Visit Information Date & Time Provider Department Dept. Phone Encounter #  
 6/8/2018  3:50 PM Inna Veloz, [address] [place] [address] Diabetes and Endocrinology [phone] [id number] Follow-up Instructions Return for 7/5/18 at 1:50pm. Upcoming Health Maintenance Date Due Hepatitis C Screening 1945 DTaP/Tdap/Td series (1 - Tdap) 5/24/1967 FOBT Q 1 YEAR AGE 50-75 5/24/1996 ZOSTER VACCINE AGE 60> 3/24/2006 GLAUCOMA SCREENING Q2Y 5/24/2011 MEDICARE YEARLY EXAM 3/14/2018 Influenza Age 5 to Adult 8/1/2018 Pneumococcal 65+ Low/Medium Risk (2 of 2 - PPSV23) 10/1/2019 Allergies as of 6/8/2018  Review Complete On: 6/8/2018 By: Inna Veloz MD  
 No Known Allergies Current Immunizations  Reviewed on 2/27/2015 Name Date Influenza Vaccine 10/1/2014 Pneumococcal Vaccine (Unspecified Type) 10/1/2014 Not reviewed this visit You Were Diagnosed With   
  
 Codes Comments Graves disease    -  Primary ICD-10-CM: E05.00 ICD-9-CM: 242.00 Vitals BP Pulse Height(growth percentile) Weight(growth percentile) BMI Smoking Status (!) 87/67 80 6' 4\" (1.93 m) 187 lb 12.8 oz (85.2 kg) 22.86 kg/m2 Former Smoker Vitals History BMI and BSA Data Body Mass Index Body Surface Area  
 22.86 kg/m 2 2.14 m 2 Preferred Pharmacy Pharmacy Name Phone [id number] [address] [phone] Your Updated Medication List  
  
   
This list is accurate as of 6/8/18  5:22 PM.  Always use your most recent med list.  
  
  
  
  
 furosemide 40 mg tablet Commonly known as:  LASIX Take 1.5 Tabs by mouth daily. methIMAzole 10 mg tablet Commonly known as:  TAPAZOLE  
 Take 10 mg daily--Dose change 6/1/18--updated med list--did not send prescription to the pharmacy  
  
 metoprolol succinate 50 mg XL tablet Commonly known as:  TOPROL-XL Take 1 Tab by mouth daily. spironolactone 25 mg tablet Commonly known as:  ALDACTONE Take 1 Tab by mouth daily. UNITHROID 50 mcg tablet Generic drug:  levothyroxine Take 1 Tab by mouth nightly. valsartan 80 mg tablet Commonly known as:  DIOVAN Take 1 Tab by mouth daily. XARELTO 20 mg Tab tablet Generic drug:  rivaroxaban Take 20 mg by mouth daily. We Performed the Following T4, FREE O6063481 CPT(R)] T4, FREE J0047567 CPT(R)] TSH 3RD GENERATION [67877 CPT(R)] TSH 3RD GENERATION [28451 CPT(R)] Follow-up Instructions Return for 7/5/18 at 1:50pm.  
  
  
Patient Instructions 1) Your TSH is a thyroid test.  Your level is 63 which is very high and above goal of 0.5-2.0. This test goes opposite of your thyroid dose and suggests your dose of methimazole is way more than you need and you have become HYPOthyroid (slow metabolism--opposite of hyperthyroidism which is why I put you on methimazole in the first place). Please stay off methimazole. 2) For the next 2 weeks, I will begin unithroid 50 mcg 1 tab at bedtime with just water and no other food or pills at the same time. This medication will start to increase your metabolism to hopefully help with energy and weight and breathing and dry skin and feeling cold all the time. 3) Plan on repeating your labs on Thursday June 21st and I'll have the results back the next morning and I'll be in touch about whether to continue this dose or stop it or change it. Then go again 2-3 days prior to your visit on 7/5/18. 
 
4) I really think a lot of your symptoms are due to your thyroid flipping from hyper to hypothyroidism and will send this note to all your doctors today. Introducing [facility] & HEALTH SERVICES! Dear Kenyon Davis: Thank you for requesting a [employer] account. Our records indicate that you already have an active [employer] account. You can access your account anytime at [email or web address] Did you know that you can access your hospital and ER discharge instructions at any time in [employer]? You can also review all of your test results from your hospital stay or ER visit. Additional Information If you have questions, please visit the Frequently Asked Questions section of the [employer] website at [email or web address] Remember, [employer] is NOT to be used for urgent needs. For medical emergencies, dial 911. Now available from your iPhone and Android! Please provide this summary of care documentation to your next provider. Your primary care clinician is listed as Temo Paige. If you have any questions after today's visit, please call [phone].

## 2018-06-15 ENCOUNTER — HOSPITAL ENCOUNTER (OUTPATIENT)
Dept: ULTRASOUND IMAGING | Age: 73
Discharge: HOME | End: 2018-06-15
Attending: PHYSICIAN ASSISTANT
Payer: MEDICARE

## 2018-06-15 VITALS
WEIGHT: 187 LBS | HEART RATE: 76 BPM | BODY MASS INDEX: 22.77 KG/M2 | HEIGHT: 76 IN | RESPIRATION RATE: 20 BRPM | DIASTOLIC BLOOD PRESSURE: 69 MMHG | SYSTOLIC BLOOD PRESSURE: 112 MMHG | TEMPERATURE: 98.6 F | OXYGEN SATURATION: 100 %

## 2018-06-15 DIAGNOSIS — R94.5 NONSPECIFIC ABNORMAL RESULTS OF LIVER FUNCTION STUDY: ICD-10-CM

## 2018-06-15 DIAGNOSIS — Z79.01 LONG TERM (CURRENT) USE OF ANTICOAGULANTS: ICD-10-CM

## 2018-06-15 DIAGNOSIS — A08.11 EPIDEMIC VOMITING SYNDROME: ICD-10-CM

## 2018-06-15 DIAGNOSIS — Z86.010 PERSONAL HISTORY OF COLONIC POLYPS: ICD-10-CM

## 2018-06-15 DIAGNOSIS — R18.8 ASCITIC FLUID: ICD-10-CM

## 2018-06-15 DIAGNOSIS — R13.10 PROBLEMS WITH SWALLOWING AND MASTICATION: ICD-10-CM

## 2018-06-15 DIAGNOSIS — R93.5 ABNORMAL ABDOMINAL ULTRASOUND: ICD-10-CM

## 2018-06-15 DIAGNOSIS — R63.0 ANOREXIA: ICD-10-CM

## 2018-06-15 DIAGNOSIS — R63.4 LOSS OF WEIGHT: ICD-10-CM

## 2018-06-15 DIAGNOSIS — Z12.11 SPECIAL SCREENING FOR MALIGNANT NEOPLASMS, COLON: ICD-10-CM

## 2018-06-15 PROCEDURE — 49083 ABD PARACENTESIS W/IMAGING: CPT

## 2018-06-15 PROCEDURE — 77030039266 HC ADH SKN EXOFIN S2SG -A

## 2018-06-15 PROCEDURE — 74011000250 HC RX REV CODE- 250: Performed by: PHYSICIAN ASSISTANT

## 2018-06-15 RX ORDER — LIDOCAINE HYDROCHLORIDE 10 MG/ML
5 INJECTION, SOLUTION EPIDURAL; INFILTRATION; INTRACAUDAL; PERINEURAL
Status: COMPLETED | OUTPATIENT
Start: 2018-06-15 | End: 2018-06-15

## 2018-06-15 RX ADMIN — LIDOCAINE HYDROCHLORIDE 5 ML: 10 INJECTION, SOLUTION EPIDURAL; INFILTRATION; INTRACAUDAL; PERINEURAL at 14:00

## 2018-06-15 NOTE — ROUTINE PROCESS
Procedure reviewed with patient by Medical Doctor. Opportunity to verbalize questions and concerns. Consent obtained.

## 2018-06-15 NOTE — DISCHARGE INSTRUCTIONS
[address] [facility]  Radiology Department  [phone]    Radiologist:  Dr. Tamica Fonseca    Date:6/15/2018      Paracentesis Discharge Instructions    You may have an aching pain in your abdomen at the puncture site tonight as the numbing medicine wears off. You may take Tylenol, as directed on the label, for  pain or discomfort. Resume your previous diet and medications. Rest the remainder of today. If we have removed a large volume of fluid, you be lightheaded or dizzy when making position changes. You may shower in 24 hours. Keep the dressing clean and dry until the site has healed. Watch for signs of infection at the puncture site. .. redness, swelling, pus, fever or chills. Contact your physician immediately if this occurs. If you experience severe sweating and new, severe abdominal pain seek emergency medical treatment. Follow up with your physician as previously discussed.

## 2018-06-15 NOTE — IP AVS SNAPSHOT
[address] [facility] 
[phone] Patient: Patricia Chambers MRN: [id number] JUWAN:1[birth year]00[birth year] About your hospitalization You were admitted on:  Linda 15, 2018 You last received care in the:  [facility] Ultrasound You were discharged on:  Linda 15, 2018 Why you were hospitalized Your primary diagnosis was:  Not on File Follow-up Information None Your Scheduled Appointments Thursday July 05, 2018  1:50 PM EDT Follow Up with Jany Talamantes MD  
[facility] Diabetes and Endocrinology [facility] [employer] [address] [address] [phone] Discharge Orders None A check jose a indicates which time of day the medication should be taken. My Medications ASK your doctor about these medications Instructions Each Dose to Equal  
 Morning Noon Evening Bedtime  
 furosemide 40 mg tablet Commonly known as:  LASIX Your last dose was: Your next dose is: Take 1.5 Tabs by mouth daily. 60 mg  
    
   
   
   
  
 methIMAzole 10 mg tablet Commonly known as:  TAPAZOLE Your last dose was: Your next dose is: Take 10 mg daily--Dose change 6/1/18--updated med list--did not send prescription to the pharmacy  
     
   
   
   
  
 metoprolol succinate 50 mg XL tablet Commonly known as:  TOPROL-XL Your last dose was: Your next dose is: Take 1 Tab by mouth daily. 50 mg  
    
   
   
   
  
 spironolactone 25 mg tablet Commonly known as:  ALDACTONE Your last dose was: Your next dose is: Take 1 Tab by mouth daily. 25 mg  
    
   
   
   
  
 UNITHROID 50 mcg tablet Generic drug:  levothyroxine Your last dose was: Your next dose is: Take 1 Tab by mouth nightly. 50 mcg valsartan 80 mg tablet Commonly known as:  DIOVAN Your last dose was: Your next dose is: Take 1 Tab by mouth daily. 80 mg XARELTO 20 mg Tab tablet Generic drug:  rivaroxaban Your last dose was: Your next dose is: Take 20 mg by mouth daily. 20 mg Discharge Instructions [facility] Radiology Department [phone] Radiologist:  Dr. Gael Manning Date:6/15/2018 Paracentesis Discharge Instructions You may have an aching pain in your abdomen at the puncture site tonight as the numbing medicine wears off. You may take Tylenol, as directed on the label, for  pain or discomfort. Resume your previous diet and medications. Rest the remainder of today. If we have removed a large volume of fluid, you be lightheaded or dizzy when making position changes. You may shower in 24 hours. Keep the dressing clean and dry until the site has healed. Watch for signs of infection at the puncture site. .. redness, swelling, pus, fever or chills. Contact your physician immediately if this occurs. If you experience severe sweating and new, severe abdominal pain seek emergency medical treatment. Follow up with your physician as previously discussed. Introducing [address]! Dear Sonja Sensor: Thank you for requesting a [employer] account. Our records indicate that you already have an active [employer] [address] account. You can access your account anytime at [email or web address] Did you know that you can access your hospital and ER discharge instructions at any time in [employer] [address]? You can also review all of your test results from your hospital stay or ER visit. Additional Information If you have questions, please visit the Frequently Asked Questions section of the [employer] website at [email or web address] Remember, [address] [employer] is NOT to be used for urgent needs. For medical emergencies, dial 911. Now available from your iPhone and Android! Introducing Rupert Najera As a [employer] patient, I wanted to make you aware of our electronic visit tool called Rupert Najera. [employer] 24/7 allows you to connect within minutes with a medical provider 24 hours a day, seven days a week via a mobile device or tablet or logging into a secure website from your computer. You can access Rupert Najera from anywhere in the [place]. A virtual visit might be right for you when you have a simple condition and feel like you just dont want to get out of bed, or cant get away from work for an appointment, when your regular [employer] provider is not available (evenings, weekends or holidays), or when youre out of town and need minor care. Electronic visits cost only $49 and if the [employer] 24/7 provider determines a prescription is needed to treat your condition, one can be electronically transmitted to a nearby pharmacy*. Please take a moment to enroll today if you have not already done so. The enrollment process is free and takes just a few minutes. To enroll, please download the [employer] 24/7 ernestine to your tablet or phone, or visit [email or web address] to enroll on your computer. And, as an [address] [place] [address] patient with a [employer] account, the results of your visits will be scanned into your electronic medical record and your primary care provider will be able to view the scanned results. We urge you to continue to see your regular [employer] provider for your ongoing medical care. And while your primary care provider may not be the one available when you seek a Rupert Najera virtual visit, the peace of mind you get from getting a real diagnosis real time can be priceless. For more information on Rupert Najera, view our Frequently Asked Questions (FAQs) at [email or web address]. Sincerely, 
 
Jay Jay Goldstein MD 
Chief Medical Officer [address] *:  certain medications cannot be prescribed via Rupert Najera Unresulted tests-please follow up with your PCP on these results Procedure/Test Authorizing Provider JANET Rey Providers Seen During Your Hospitalization Provider Specialty Primary office phone [facility] [phone] Your Primary Care Physician (PCP) Primary Care Physician Office Phone Office Fax Gerardo García, [address] [phone] You are allergic to the following No active allergies Recent Documentation Height Weight BMI Smoking Status 1.93 m 84.8 kg 22.76 kg/m2 Former Smoker Emergency Contacts Name Discharge Info Relation Home Work Mobile Mary Anne Hugo DISCHARGE CAREGIVER [3] Spouse [3] [phone] Patient Belongings The following personal items are in your possession at time of discharge: 
                             
 
  
  
 Please provide this summary of care documentation to your next provider. Signatures-by signing, you are acknowledging that this After Visit Summary has been reviewed with you and you have received a copy. Patient Signature:  ____________________________________________________________ Date:  ____________________________________________________________  
  
Rakel Eisenberg Provider Signature:  ____________________________________________________________ Date:  ____________________________________________________________

## 2018-06-15 NOTE — PROGRESS NOTES
Discharge instructions reviewed with patient. Patient verbalizes understanding. Discharged via wheelchair in stable condition. Patient released with family member via wheelchair.

## 2018-06-20 NOTE — TELEPHONE ENCOUNTER
Dr Jojo Wilson, spoke with patient. CT scan will be in 6 month from last office visit . Patient is agreeable.

## 2018-06-22 LAB
T4 FREE SERPL-MCNC: 1.1 NG/DL (ref 0.82–1.77)
TSH SERPL DL<=0.005 MIU/L-ACNC: 31.32 UIU/ML (ref 0.45–4.5)

## 2018-07-03 LAB
T4 FREE SERPL-MCNC: 1.26 NG/DL (ref 0.82–1.77)
TSH SERPL DL<=0.005 MIU/L-ACNC: 17.55 UIU/ML (ref 0.45–4.5)

## 2018-07-05 ENCOUNTER — OFFICE VISIT (OUTPATIENT)
Dept: ENDOCRINOLOGY | Age: 73
End: 2018-07-05

## 2018-07-05 VITALS
DIASTOLIC BLOOD PRESSURE: 71 MMHG | WEIGHT: 194.2 LBS | SYSTOLIC BLOOD PRESSURE: 97 MMHG | HEART RATE: 80 BPM | BODY MASS INDEX: 23.65 KG/M2 | HEIGHT: 76 IN

## 2018-07-05 DIAGNOSIS — E05.00 GRAVES DISEASE: Primary | ICD-10-CM

## 2018-07-05 RX ORDER — LEVOTHYROXINE SODIUM 75 UG/1
75 TABLET ORAL
Qty: 42 TAB | Refills: 0 | Status: SHIPPED | COMMUNITY
Start: 2018-07-05 | End: 2018-08-16 | Stop reason: SDUPTHER

## 2018-07-05 RX ORDER — FUROSEMIDE 40 MG/1
20 TABLET ORAL DAILY
COMMUNITY

## 2018-07-05 NOTE — MR AVS SNAPSHOT
[address] [facility] [address] [address] [phone] Patient: Liam Kinney MRN: [id number] ITE:9/71/1585 Visit Information Date & Time Provider Department Dept. Phone Encounter #  
 7/5/2018  1:50 PM Madi Nava, [facility] Diabetes and Endocrinology [phone] [id number] Follow-up Instructions Return for 8/16/18 at 10:30am. Upcoming Health Maintenance Date Due Hepatitis C Screening 1945 DTaP/Tdap/Td series (1 - Tdap) 5/24/1967 FOBT Q 1 YEAR AGE 50-75 5/24/1996 ZOSTER VACCINE AGE 60> 3/24/2006 GLAUCOMA SCREENING Q2Y 5/24/2011 MEDICARE YEARLY EXAM 3/14/2018 Influenza Age 5 to Adult 8/1/2018 Pneumococcal 65+ Low/Medium Risk (2 of 2 - PPSV23) 10/1/2019 Allergies as of 7/5/2018  Review Complete On: 7/5/2018 By: Madi Nava MD  
 No Known Allergies Current Immunizations  Reviewed on 2/27/2015 Name Date Influenza Vaccine 10/1/2014 Pneumococcal Vaccine (Unspecified Type) 10/1/2014 Not reviewed this visit Vitals BP Pulse Height(growth percentile) Weight(growth percentile) BMI Smoking Status 97/71 80 6' 4\" (1.93 m) 194 lb 3.2 oz (88.1 kg) 23.64 kg/m2 Former Smoker Vitals History BMI and BSA Data Body Mass Index Body Surface Area  
 23.64 kg/m 2 2.17 m 2 Preferred Pharmacy Pharmacy Name Phone [id number] [place] [id number] [facility] [phone] Your Updated Medication List  
  
   
This list is accurate as of 7/5/18  2:47 PM.  Always use your most recent med list.  
  
  
  
  
 furosemide 40 mg tablet Commonly known as:  LASIX Take 1.5 Tabs by mouth daily. metoprolol succinate 50 mg XL tablet Commonly known as:  TOPROL-XL Take 1 Tab by mouth daily. spironolactone 25 mg tablet Commonly known as:  ALDACTONE Take 1 Tab by mouth daily. UNITHROID 75 mcg tablet Generic drug:  levothyroxine Take 1 Tab by mouth Daily (before breakfast). valsartan 80 mg tablet Commonly known as:  DIOVAN Take 1 Tab by mouth daily. XARELTO 20 mg Tab tablet Generic drug:  rivaroxaban Take 20 mg by mouth daily. Follow-up Instructions Return for 8/16/18 at 10:30am.  
  
  
Patient Instructions 1) TSH is a thyroid test.  Your level is 17 which is still high and above goal of 0.5-2.0 but down from 31 in 6/18 and 43 in May. This test goes opposite of your thyroid dose and suggests your dose of unithroid is not quite enough. I will increase your dose to 75 mcg daily and gave you 6 weeks of samples. 2) Plan on repeating your labs on 8/13/18 and I'll be in touch with the results and a plan. 3) My hope is with normalizing your thyroid function and you will not need further fluid drained off your abdomen and your breathing will stay normal. 
 
 
  
Introducing \[facility]\"" & HEALTH SERVICES! Dear Maddi Marin: Thank you for requesting a [employer] account. Our records indicate that you already have an active Sh[employer] account. You can access your account anytime at [email or web address] Software Solutions Did you know that you can access your hospital and ER discharge instructions at any time in Sh[employer]? You can also review all of your test results from your hospital stay or ER visit. Additional Information If you have questions, please visit the Frequently Asked Questions section of the Sh[employer]iques website at [email or web address] Remember, Shoptiques is NOT to be used for urgent needs. For medical emergencies, dial 911. Now available from your iPhone and Android! Please provide this summary of care documentation to your next provider. Your primary care clinician is listed as Gala Platt. If you have any questions after today's visit, please call [phone].

## 2018-07-05 NOTE — PROGRESS NOTES
Chief Complaint   Patient presents with    Thyroid Problem     pcp and pharmacy confirmed     History of Present Illness: Bere Rivera is a 68 y.o. male here for follow up of thyroid. Weight up 7 lbs since last visit in 6/18. Has been taking the unithroid 50 mcg daily at bedtime and is starting to feel better. Is not losing his breath as easily and bowels are frequent and energy is starting to improve. Still having dry skin but less cold than a month ago. Had another paracentesis on 6/15/18 and had 5.5L removed. Does feel that some of the ascites has re-accumulated but not as much as before. Taking lasix 1 tab daily and mehdi 25 mg daily and has refills through Dr. Don Britton' office. Current Outpatient Prescriptions   Medication Sig    UNITHROID 50 mcg tablet Take 1 Tab by mouth nightly.  furosemide (LASIX) 40 mg tablet Take 1.5 Tabs by mouth daily. (Patient taking differently: Take 40 mg by mouth daily.)    spironolactone (ALDACTONE) 25 mg tablet Take 1 Tab by mouth daily.  metoprolol succinate (TOPROL-XL) 50 mg XL tablet Take 1 Tab by mouth daily.  valsartan (DIOVAN) 80 mg tablet Take 1 Tab by mouth daily.  rivaroxaban (XARELTO) 20 mg tab tablet Take 20 mg by mouth daily. No current facility-administered medications for this visit. No Known Allergies     Review of Systems:  - Cardiovascular: no chest pain or palpitations  - Neurological: no tremors  - Integumentary: skin is normal    Physical Examination:  Blood pressure 97/71, pulse 80, height 6' 4\" (1.93 m), weight 194 lb 3.2 oz (88.1 kg).   - General: pleasant, no distress, good eye contact   - Neck: small goiter, no thyroid bruit  - Cardiovascular: regular, normal rate, nl s1 and s2, no m/r/g   - Respiratory: clear to auscultation bilaterally   - GI: mild distention, (+) small fluid wave  - Integumentary: skin is normal, no edema  - Neurological: reflexes 2+ at biceps, no tremors  - Psychiatric: normal mood and affect    Data Reviewed:   Component      Latest Ref Rng & Units 7/2/2018 7/2/2018          10:38 AM 10:38 AM   T4, Free      0.82 - 1.77 ng/dL  1.26   TSH      0.450 - 4.500 uIU/mL 17.550 (H)        Assessment/Plan:     1. Grave's disease: Was seen for hospital consultation in Jan 2018 and his TSH was < 0.01 and FT4 was > 8.0 and T3 was 229 and TRAb was 1.98. I initially started him on methimazole 20 mg bid but he called our office 2 weeks later and was not feeling better so I increased his dose to 30 mg bid and his TSH was still < 0.006 but FT4 down to 2.83 and T3 normal at 115 in 3/18. I explained that the TSH takes longer to normalize so I kept his dose the same. TSH up to 9.67 and FT4 down to 1.17 on 4/17/18 so decreased dose to 20 mg bid. Repeat TSH up to 43 and FT4 down to 0.88 in 5/18 so cut back to 20 mg daily. Called us on 6/1/18 with continued shortness of breath and I recommended decreasing to 10 mg daily until he had labs drawn on 6/4/18 that showed his TSH up to 63 and FT4 down to 0.57 and I had him stop the methimazole the next day. I'm wondering whether the amiodarone that he was on until 2/18 could have been precipitating some of the hyperthyroidism and now that he has been off this the past 3 months, if he became very sensitive to the methimazole and this has led to the profound hypothyroidism that is the likely cause of a lot of his symptoms including the dyspnea and new onset ascites. I kept him off the methimazole and instead began a course of unithroid 50 mcg at bedtime on 6/8/18 to see if I can bring his TSH down and FT4 up and TSH down to 31 and FT4 up to 1.1 on 6/21/18 and kept him on this dose and TSH down to 17 and FT4 up to 1.26 on 7/2/18. I will further increase his dose to 75 mcg daily to try and normalize his TSH. I would strongly recommend holding on any further GI or cardiac evaluation until his thyroid is regulated in case this is the cause of all his symptoms.   We spent 25 minutes of face to face time together and > 50% of the time was spent in counseling regarding management of his thyroid. - increase unithroid to 75 mcg at bedtime  - check TSH and free prior to next visit       Patient Instructions   1) TSH is a thyroid test.  Your level is 17 which is still high and above goal of 0.5-2.0 but down from 31 in 6/18 and 43 in May. This test goes opposite of your thyroid dose and suggests your dose of unithroid is not quite enough. I will increase your dose to 75 mcg daily and gave you 6 weeks of samples. 2) Plan on repeating your labs on 8/13/18 and I'll be in touch with the results and a plan. 3) My hope is with normalizing your thyroid function and you will not need further fluid drained off your abdomen and your breathing will stay normal.    Follow-up Disposition:  Return for 8/16/18 at 10:30am.    Copy sent to:   Eddi Wilson MD as PCP - General (Family Practice)  Yusuf Lilly MD (Cardiology)  Gisella Orozco MD (Gastroenterology)  Louis Mejia MD as Surgeon (General Surgery)

## 2018-07-05 NOTE — PATIENT INSTRUCTIONS
1) TSH is a thyroid test.  Your level is 17 which is still high and above goal of 0.5-2.0 but down from 31 in 6/18 and 43 in May. This test goes opposite of your thyroid dose and suggests your dose of unithroid is not quite enough. I will increase your dose to 75 mcg daily and gave you 6 weeks of samples. 2) Plan on repeating your labs on 8/13/18 and I'll be in touch with the results and a plan.     3) My hope is with normalizing your thyroid function and you will not need further fluid drained off your abdomen and your breathing will stay normal.

## 2018-08-14 LAB
T4 FREE SERPL-MCNC: 1.43 NG/DL (ref 0.82–1.77)
TSH SERPL DL<=0.005 MIU/L-ACNC: 2.82 UIU/ML (ref 0.45–4.5)

## 2018-08-16 ENCOUNTER — OFFICE VISIT (OUTPATIENT)
Dept: ENDOCRINOLOGY | Age: 73
End: 2018-08-16

## 2018-08-16 VITALS
RESPIRATION RATE: 18 BRPM | HEART RATE: 81 BPM | OXYGEN SATURATION: 95 % | HEIGHT: 76 IN | DIASTOLIC BLOOD PRESSURE: 89 MMHG | SYSTOLIC BLOOD PRESSURE: 140 MMHG | WEIGHT: 209.4 LBS | BODY MASS INDEX: 25.5 KG/M2

## 2018-08-16 DIAGNOSIS — E05.00 GRAVES DISEASE: Primary | ICD-10-CM

## 2018-08-16 RX ORDER — LEVOTHYROXINE SODIUM 75 UG/1
75 TABLET ORAL
Qty: 30 TAB | Refills: 11 | Status: SHIPPED | OUTPATIENT
Start: 2018-08-16 | End: 2018-10-29 | Stop reason: SDUPTHER

## 2018-08-16 NOTE — PATIENT INSTRUCTIONS
1) TSH is a thyroid test.  Your level is 2.8 which is normal and at goal of 0.5-3.0. This test goes opposite of your thyroid dose and suggests your dose of unithroid is working well. I will keep your dose the same and you can use the last few pills of the sample but [facility] taking 1 tab daily at bedtime    2) Plan on repeating your labs in 6 weeks and I'll e-mail you with the results.   If everything is still normal, I can send a 90 day supply at that time, if you'd like

## 2018-08-16 NOTE — PROGRESS NOTES
Chief Complaint   Patient presents with    Thyroid Problem     pcp and pharmacy verified    Visit Vitals    /89 (BP 1 Location: Left arm, BP Patient Position: Sitting)    Pulse 81    Resp 18    Ht 6' 4\" (1.93 m)    Wt 209 lb 6.4 oz (95 kg)    SpO2 95%    BMI 25.49 kg/m2

## 2018-08-16 NOTE — PROGRESS NOTES
Chief Complaint   Patient presents with    Thyroid Problem     History of Present Illness: Liam Kinney is a 68 y.o. male here for follow up of thyroid. Weight up 15 lbs since last visit in 7/18. Has been taking the 75 mcg unithroid the past month and shortness of breath has improved and appetite has improved. Energy is slowly improving. No chest pain or palpitations. Hasn't needed any further fluid drained off over the past month. Still taking lasix and mehdi. Due to see Dr. Camille Olivo this afternoon. Saw Dr. Liya Sexton recently. Current Outpatient Prescriptions   Medication Sig    UNITHROID 75 mcg tablet Take 1 Tab by mouth Daily (before breakfast).  furosemide (LASIX) 40 mg tablet Take  by mouth daily.  spironolactone (ALDACTONE) 25 mg tablet Take 1 Tab by mouth daily.  metoprolol succinate (TOPROL-XL) 50 mg XL tablet Take 1 Tab by mouth daily.  valsartan (DIOVAN) 80 mg tablet Take 1 Tab by mouth daily.  rivaroxaban (XARELTO) 20 mg tab tablet Take 20 mg by mouth daily. No current facility-administered medications for this visit. No Known Allergies     Review of Systems:  - Cardiovascular: no chest pain  - Neurological: no tremors  - Integumentary: skin is normal    Physical Examination:  Blood pressure 140/89, pulse 81, resp. rate 18, height 6' 4\" (1.93 m), weight 209 lb 6.4 oz (95 kg), SpO2 95 %. - General: pleasant, no distress, good eye contact   - Neck: small goiter  - Cardiovascular: regular, normal rate, nl s1 and s2, no m/r/g   - Respiratory: clear to auscultation bilaterally   - Integumentary: skin is normal, no edema   - GI: soft, small fluid wave  - Neurological: reflexes 2+ at biceps, no tremors  - Psychiatric: normal mood and affect    Data Reviewed:   Component      Latest Ref Rng & Units 8/13/2018 8/13/2018          10:37 AM 10:37 AM   T4, Free      0.82 - 1.77 ng/dL  1.43   TSH      0.450 - 4.500 uIU/mL 2.820        Assessment/Plan:     1.  Grave's disease: Was seen for hospital consultation in Jan 2018 and his TSH was < 0.01 and FT4 was > 8.0 and T3 was 229 and TRAb was 1.98. I initially started him on methimazole 20 mg bid but he called our office 2 weeks later and was not feeling better so I increased his dose to 30 mg bid and his TSH was still < 0.006 but FT4 down to 2.83 and T3 normal at 115 in 3/18. I explained that the TSH takes longer to normalize so I kept his dose the same. TSH up to 9.67 and FT4 down to 1.17 on 4/17/18 so decreased dose to 20 mg bid. Repeat TSH up to 43 and FT4 down to 0.88 in 5/18 so cut back to 20 mg daily. Called us on 6/1/18 with continued shortness of breath and I recommended decreasing to 10 mg daily until he had labs drawn on 6/4/18 that showed his TSH up to 63 and FT4 down to 0.57 and I had him stop the methimazole the next day. I'm wondering whether the amiodarone that he was on until 2/18 could have been precipitating some of the hyperthyroidism and now that he has been off this the past 3 months, if he became very sensitive to the methimazole and this has led to the profound hypothyroidism that is the likely cause of a lot of his symptoms including the dyspnea and new onset ascites. I kept him off the methimazole and instead began a course of unithroid 50 mcg at bedtime on 6/8/18 to see if I can bring his TSH down and FT4 up and TSH down to 31 and FT4 up to 1.1 on 6/21/18 and kept him on this dose and TSH down to 17 and FT4 up to 1.26 on 7/2/18. I further increased his dose to 75 mcg daily to try and normalize his TSH and it was down to 2.82 in 8/18. Will keep his dose the same and change to generic levothyroxine 75 mcg daily for cost but if he feels worse, may get approval for brand.   - change to levothyroxine 75 mcg at bedtime  - check TSH and free T4 in 6 weeks and prior to next visit       Patient Instructions   1) TSH is a thyroid test.  Your level is 2.8 which is normal and at goal of 0.5-3.0.   This test goes opposite of your thyroid dose and suggests your dose of unithroid is working well. I will keep your dose the same and you can use the last few pills of the sample but have sent generic levothyroxine 75 mcg tabs to [facility] to keep taking 1 tab daily at bedtime    2) Plan on repeating your labs in 6 weeks and I'll e-mail you with the results. If everything is still normal, I can send a 90 day supply at that time, if you'd like          Follow-up Disposition:  Return in about 5 months (around 1/16/2019). Copy sent to: Shamika Carcamo MD as PCP - General (Family Practice)  Dr. Aldo Curtis MD (Gastroenterology)  Dr. Naz Rios    Lab follow up: 9/27/18  Component      Latest Ref Rng & Units 9/24/2018 9/24/2018          11:31 AM 11:31 AM   T4, Free      0.82 - 1.77 ng/dL  1.27   TSH      0.450 - 4.500 uIU/mL 2.180      Sent him the following message through kissnofrog:  TSH is a thyroid test.  Your level is 2.18 which is still normal and at goal of 0.5-3.0. This test goes opposite of your thyroid dose and suggests your dose of levothyroxine is working well so I will keep your dose the same and please continue to refill this until you come back and repeat your labs again the week prior to your next visit.

## 2018-08-16 NOTE — MR AVS SNAPSHOT
[address] [address] [phone] Patient: Bay Zamarripa MRN: [id number] YHI:0[birth year]89/1751 Visit Information Date & Time Provider Department Dept. Phone Encounter #  
 8/16/2018 10:30 AM Lorin Wall, [address] [facility] Diabetes and Endocrinology [phone] [id number] Follow-up Instructions Return in about 5 months (around 1/16/2019). Upcoming Health Maintenance Date Due Hepatitis C Screening 1945 DTaP/Tdap/Td series (1 - Tdap) 5/24/1967 FOBT Q 1 YEAR AGE 50-75 5/24/1996 ZOSTER VACCINE AGE 60> 3/24/2006 GLAUCOMA SCREENING Q2Y 5/24/2011 MEDICARE YEARLY EXAM 3/14/2018 Influenza Age 5 to Adult 8/1/2018 Pneumococcal 65+ Low/Medium Risk (2 of 2 - PPSV23) 10/1/2019 Allergies as of 8/16/2018  Review Complete On: 8/16/2018 By: Lorin Wall MD  
 No Known Allergies Current Immunizations  Reviewed on 2/27/2015 Name Date Influenza Vaccine 10/1/2014 Pneumococcal Vaccine (Unspecified Type) 10/1/2014 Not reviewed this visit You Were Diagnosed With   
  
 Codes Comments Graves disease    -  Primary ICD-10-CM: E05.00 ICD-9-CM: 242.00 Vitals BP Pulse Resp Height(growth percentile) Weight(growth percentile) SpO2  
 140/89 (BP 1 Location: Left arm, BP Patient Position: Sitting) 81 18 6' 4\" (1.93 m) 209 lb 6.4 oz (95 kg) 95% BMI Smoking Status 25.49 kg/m2 Former Smoker Vitals History BMI and BSA Data Body Mass Index Body Surface Area  
 25.49 kg/m 2 2.26 m 2 Preferred Pharmacy Pharmacy Name Phone [id number], [address] [place] [phone] Your Updated Medication List  
  
   
This list is accurate as of 8/16/18 11:01 AM.  Always use your most recent med list.  
  
  
  
  
 furosemide 40 mg tablet Commonly known as:  LASIX Take  by mouth daily. levothyroxine 75 mcg tablet Commonly known as:  Lili Clutter Take 1 Tab by mouth Daily (before breakfast). metoprolol succinate 50 mg XL tablet Commonly known as:  TOPROL-XL Take 1 Tab by mouth daily. spironolactone 25 mg tablet Commonly known as:  ALDACTONE Take 1 Tab by mouth daily. valsartan 80 mg tablet Commonly known as:  DIOVAN Take 1 Tab by mouth daily. XARELTO 20 mg Tab tablet Generic drug:  rivaroxaban Take 20 mg by mouth daily. Prescriptions Sent to Pharmacy Refills  
 levothyroxine (UNITHROID) 75 mcg tablet 11 Sig: Take 1 Tab by mouth Daily (before breakfast). Class: Normal  
 Pharmacy: [id number]  #: [phone] Route: Oral  
  
We Performed the Following T4, FREE V2863026 CPT(R)] T4, FREE V8786321 CPT(R)] TSH 3RD GENERATION [36680 CPT(R)] TSH 3RD GENERATION [66573 CPT(R)] Follow-up Instructions Return in about 5 months (around 1/16/2019). Patient Instructions 1) TSH is a thyroid test.  Your level is 2.8 which is normal and at goal of 0.5-3.0. This test goes opposite of your thyroid dose and suggests your dose of unithroid is working well. I will keep your dose the same and you can use the last few pills of the sample but have sent generic levothyroxine 75 mcg tabs to [facility] to keep taking 1 tab daily at bedtime 2) Plan on repeating your labs in 6 weeks and I'll e-mail you with the results. If everything is still normal, I can send a 90 day supply at that time, if you'd like Introducing [facility]! Dear Destiny Romo: Thank you for requesting a [employer] account. Our records indicate that you already have an active [employer] account. You can access your account anytime at [email or web address] Did you know that you can access your hospital and ER discharge instructions at any time in [employer]? You can also review all of your test results from your hospital stay or ER visit. Additional Information If you have questions, please visit the Frequently Asked Questions section of the [employer] website at [email or web address] Remember, [employer] is NOT to be used for urgent needs. For medical emergencies, dial 911. Now available from your iPhone and Android! Please provide this summary of care documentation to your next provider. Your primary care clinician is listed as Amy Dave. If you have any questions after today's visit, please call [phone].

## 2018-09-25 LAB
T4 FREE SERPL-MCNC: 1.27 NG/DL (ref 0.82–1.77)
TSH SERPL DL<=0.005 MIU/L-ACNC: 2.18 UIU/ML (ref 0.45–4.5)

## 2018-10-29 RX ORDER — LEVOTHYROXINE SODIUM 75 UG/1
75 TABLET ORAL
Qty: 90 TAB | Refills: 3 | Status: SHIPPED | OUTPATIENT
Start: 2018-10-29 | End: 2019-10-16 | Stop reason: SDUPTHER

## 2018-11-30 LAB
T4 FREE SERPL-MCNC: 1.21 NG/DL (ref 0.82–1.77)
TSH SERPL DL<=0.005 MIU/L-ACNC: 2.16 UIU/ML (ref 0.45–4.5)

## 2019-01-02 ENCOUNTER — TELEPHONE (OUTPATIENT)
Dept: ENDOCRINOLOGY | Age: 74
End: 2019-01-02

## 2019-01-02 NOTE — TELEPHONE ENCOUNTER
No I plan on reviewing his labs from December at his visit next week. No additional labs are needed. Thanks.

## 2019-01-02 NOTE — TELEPHONE ENCOUNTER
----- Message from Maryjo Umanzor sent at 1/2/2019 12:32 PM EST -----  Regarding: Dr. Jeanie Adams stated he had blood work done before [facility], and would like to know if he needs to have it done again for his appt 1/10/[phone]. If he needs to have labs again he will pick form up from office. Leave detailed message if unavailable.

## 2019-01-10 ENCOUNTER — OFFICE VISIT (OUTPATIENT)
Dept: ENDOCRINOLOGY | Age: 74
End: 2019-01-10

## 2019-01-10 VITALS
HEART RATE: 83 BPM | BODY MASS INDEX: 29.01 KG/M2 | WEIGHT: 238.2 LBS | HEIGHT: 76 IN | SYSTOLIC BLOOD PRESSURE: 116 MMHG | DIASTOLIC BLOOD PRESSURE: 75 MMHG

## 2019-01-10 DIAGNOSIS — E05.00 GRAVES DISEASE: Primary | ICD-10-CM

## 2019-01-10 NOTE — PATIENT INSTRUCTIONS
1) TSH is a thyroid test.  Your level is 2.1 which is normal and at goal of 0.5-3.0. This test goes opposite of your thyroid dose and suggests your dose of levothyroxine is perfect so I will keep your dose the same and see you back in 6 months.

## 2019-01-10 NOTE — PROGRESS NOTES
Chief Complaint   Patient presents with    Thyroid Problem     pcp and pharmacy confirmed     History of Present Illness: Skye Flores is a 68 y.o. male here for follow up of thyroid. Weight up 29 lbs since last visit in 8/18. Went to have his labs drawn in November just to make sure everything was okay but I kept his dose the same until he came in today. Has not had any shortness of breath and continues on lasix and spironolactone. No chest pain or palpitations. Has had some tingling in both hands and fingers and sometimes can wake up at night due to this but no neck pain. No tremors. Bowels are most regular but can be looser based on what he eats. No heat or cold intolerance. Overall energy is the same on the generic levothyroxine and still takes this at bedtime as he takes all other pills in the morning. Hasn't missed any doses or run out. Weight gain is likely from eating more as his appetite is much better and eats breakfast out most days of the week and doesn't appear to have ascites on exam as no fluid wave felt. Current Outpatient Medications   Medication Sig    levothyroxine (UNITHROID) 75 mcg tablet Take 1 Tab by mouth Daily (before breakfast).  furosemide (LASIX) 40 mg tablet Take  by mouth daily.  spironolactone (ALDACTONE) 25 mg tablet Take 1 Tab by mouth daily.  metoprolol succinate (TOPROL-XL) 50 mg XL tablet Take 1 Tab by mouth daily.  valsartan (DIOVAN) 80 mg tablet Take 1 Tab by mouth daily.  rivaroxaban (XARELTO) 20 mg tab tablet Take 20 mg by mouth daily. No current facility-administered medications for this visit. No Known Allergies     Review of Systems:  - Cardiovascular: no chest pain  - Neurological: no tremors  - Integumentary: skin is normal    Physical Examination:  Blood pressure 116/75, pulse 83, height 6' 4\" (1.93 m), weight 238 lb 3.2 oz (108 kg).   - General: pleasant, no distress, good eye contact   - Neck: (+) small goiter  - Cardiovascular: regular, normal rate, nl s1 and s2, no m/r/g   - Respiratory: clear to auscultation bilaterally   - Integumentary: skin is normal, no edema  - Neurological: reflexes 2+ at biceps, no tremors  - Psychiatric: normal mood and affect    Data Reviewed:   Component      Latest Ref Rng & Units 11/29/2018 11/29/2018           1:41 PM  1:41 PM   T4, Free      0.82 - 1.77 ng/dL  1.21   TSH      0.450 - 4.500 uIU/mL 2.160        Assessment/Plan:     1. Grave's disease: Was seen for hospital consultation in Jan 2018 and his TSH was < 0.01 and FT4 was > 8.0 and T3 was 229 and TRAb was 1.98. I initially started him on methimazole 20 mg bid but he called our office 2 weeks later and was not feeling better so I increased his dose to 30 mg bid and his TSH was still < 0.006 but FT4 down to 2.83 and T3 normal at 115 in 3/18. I explained that the TSH takes longer to normalize so I kept his dose the same. TSH up to 9.67 and FT4 down to 1.17 on 4/17/18 so decreased dose to 20 mg bid. Repeat TSH up to 43 and FT4 down to 0.88 in 5/18 so cut back to 20 mg daily. Called us on 6/1/18 with continued shortness of breath and I recommended decreasing to 10 mg daily until he had labs drawn on 6/4/18 that showed his TSH up to 63 and FT4 down to 0.57 and I had him stop the methimazole the next day. I'm wondering whether the amiodarone that he was on until 2/18 could have been precipitating some of the hyperthyroidism and now that he has been off this the past 3 months, if he became very sensitive to the methimazole and this has led to the profound hypothyroidism that is the likely cause of a lot of his symptoms including the dyspnea and new onset ascites.   I kept him off the methimazole and instead began a course of unithroid 50 mcg at bedtime on 6/8/18 to see if I can bring his TSH down and FT4 up and TSH down to 31 and FT4 up to 1.1 on 6/21/18 and kept him on this dose and TSH down to 17 and FT4 up to 1.26 on 7/2/18. I further increased his dose to 75 mcg daily to try and normalize his TSH and it was down to 2.82 in 8/18. I kept his dose the same and changed to generic levothyroxine 75 mcg daily for cost and TSH 2.18 in 9/18 and 2.16 in 11/18 so kept dose the same. - cont levothyroxine 75 mcg at bedtime  - check TSH and free T4 prior to next visit       Patient Instructions   1) TSH is a thyroid test.  Your level is 2.1 which is normal and at goal of 0.5-3.0. This test goes opposite of your thyroid dose and suggests your dose of levothyroxine is perfect so I will keep your dose the same and see you back in 6 months. Follow-up Disposition:  Return in about 6 months (around 7/10/2019). Copy sent to:   Ruth Sidhu MD as PCP - General (Family Practice)  Dr. Belle Lynne MD (Gastroenterology)  Dr. Purnima Oconnor

## 2019-03-19 LAB — CREATININE, EXTERNAL: 1.32

## 2019-07-16 LAB
T4 FREE SERPL-MCNC: 1.25 NG/DL (ref 0.82–1.77)
TSH SERPL DL<=0.005 MIU/L-ACNC: 1.92 UIU/ML (ref 0.45–4.5)

## 2019-07-18 ENCOUNTER — OFFICE VISIT (OUTPATIENT)
Dept: ENDOCRINOLOGY | Age: 74
End: 2019-07-18

## 2019-07-18 VITALS
WEIGHT: 248.2 LBS | HEART RATE: 81 BPM | DIASTOLIC BLOOD PRESSURE: 73 MMHG | HEIGHT: 76 IN | BODY MASS INDEX: 30.22 KG/M2 | SYSTOLIC BLOOD PRESSURE: 101 MMHG

## 2019-07-18 DIAGNOSIS — E05.00 GRAVES DISEASE: Primary | ICD-10-CM

## 2019-07-18 NOTE — PROGRESS NOTES
Chief Complaint   Patient presents with    Thyroid Problem     pcp and pharmacy confirmed     History of Present Illness: Kendell Fulton is a 69 y.o. male here for follow up of thyroid. Weight up 10 lbs since last visit in 1/19. Has still been taking levothyroxine at bedtime and hasn't missed or run out. Bowels are mostly regular. No dry skin or brittle nails. No heat or cold intolerance. Breathing is still good aside from when he bends over to tie his shoes and may get out of breath. Overall energy is good. Playing golf once a week. Current Outpatient Medications   Medication Sig    OTHER,NON-FORMULARY, Sylvester red supplement    levothyroxine (UNITHROID) 75 mcg tablet Take 1 Tab by mouth Daily (before breakfast).  furosemide (LASIX) 40 mg tablet Take  by mouth daily.  spironolactone (ALDACTONE) 25 mg tablet Take 1 Tab by mouth daily.  metoprolol succinate (TOPROL-XL) 50 mg XL tablet Take 1 Tab by mouth daily.  valsartan (DIOVAN) 80 mg tablet Take 1 Tab by mouth daily.  rivaroxaban (XARELTO) 20 mg tab tablet Take 20 mg by mouth daily. No current facility-administered medications for this visit. No Known Allergies     Review of Systems:  - Cardiovascular: no chest pain  - Neurological: no tremors  - Integumentary: skin is normal    Physical Examination:  Blood pressure 101/73, pulse 81, height 6' 4\" (1.93 m), weight 248 lb 3.2 oz (112.6 kg). - General: pleasant, no distress, good eye contact   - Neck: small goiter  - Cardiovascular: regular, normal rate, nl s1 and s2, no m/r/g   - Respiratory: clear to auscultation bilaterally   - Integumentary: skin is normal, no edema  - Neurological: reflexes 2+ at biceps, no tremors  - Psychiatric: normal mood and affect    Data Reviewed:   Component      Latest Ref Rng & Units 7/15/2019 7/15/2019           9:16 AM  9:16 AM   T4, Free      0.82 - 1.77 ng/dL  1.25   TSH      0.450 - 4.500 uIU/mL 1.920        Assessment/Plan:     1.  Grave's disease: Was seen for hospital consultation in Jan 2018 and his TSH was < 0.01 and FT4 was > 8.0 and T3 was 229 and TRAb was 1.98. I initially started him on methimazole 20 mg bid but he called our office 2 weeks later and was not feeling better so I increased his dose to 30 mg bid and his TSH was still < 0.006 but FT4 down to 2.83 and T3 normal at 115 in 3/18. I explained that the TSH takes longer to normalize so I kept his dose the same. TSH up to 9.67 and FT4 down to 1.17 on 4/17/18 so decreased dose to 20 mg bid. Repeat TSH up to 43 and FT4 down to 0.88 in 5/18 so cut back to 20 mg daily. Called us on 6/1/18 with continued shortness of breath and I recommended decreasing to 10 mg daily until he had labs drawn on 6/4/18 that showed his TSH up to 63 and FT4 down to 0.57 and I had him stop the methimazole the next day. I'm wondering whether the amiodarone that he was on until 2/18 could have been precipitating some of the hyperthyroidism and now that he has been off this the past 3 months, if he became very sensitive to the methimazole and this has led to the profound hypothyroidism that is the likely cause of a lot of his symptoms including the dyspnea and new onset ascites. I kept him off the methimazole and instead began a course of unithroid 50 mcg at bedtime on 6/8/18 to see if I can bring his TSH down and FT4 up and TSH down to 31 and FT4 up to 1.1 on 6/21/18 and kept him on this dose and TSH down to 17 and FT4 up to 1.26 on 7/2/18. I further increased his dose to 75 mcg daily to try and normalize his TSH and it was down to 2.82 in 8/18. I kept his dose the same and changed to generic levothyroxine 75 mcg daily for cost and TSH 2.18 in 9/18 and 2.16 in 11/18 and 1.92 in 7/19 so kept dose the same and will see him annually at this point.   - cont levothyroxine 75 mcg at bedtime  - check TSH and free T4 prior to next visit       Patient Instructions   1) TSH is a thyroid test.  Your level is 1.9 which is normal and at goal of 0.5-2.0. This test goes opposite of your thyroid dose and suggests your dose of levothyroxine is perfect so I will keep your dose the same. 2) I will plan on seeing you back in one year but if you feel you need to have your labs checked sooner, please let me know. Follow-up and Dispositions    · Return in about 1 year (around 7/18/2020). Copy sent to:   Divya Nicholas MD as PCP - General (Family Practice)  Dr. Remberto Myrick MD (Gastroenterology)  Dr. Jeff Beck

## 2019-07-18 NOTE — PATIENT INSTRUCTIONS
1) TSH is a thyroid test.  Your level is 1.9 which is normal and at goal of 0.5-2.0. This test goes opposite of your thyroid dose and suggests your dose of levothyroxine is perfect so I will keep your dose the same. 2) I will plan on seeing you back in one year but if you feel you need to have your labs checked sooner, please let me know.

## 2019-09-16 RX ORDER — SODIUM BICARBONATE 650 MG/1
650 TABLET ORAL 2 TIMES DAILY
COMMUNITY

## 2019-10-16 RX ORDER — LEVOTHYROXINE SODIUM 75 UG/1
TABLET ORAL
Qty: 90 TAB | Refills: 3 | Status: SHIPPED | OUTPATIENT
Start: 2019-10-16 | End: 2020-10-13 | Stop reason: SDUPTHER

## 2020-07-05 DIAGNOSIS — E05.00 GRAVES DISEASE: ICD-10-CM

## 2020-07-21 LAB
T4 FREE SERPL-MCNC: 1.14 NG/DL (ref 0.82–1.77)
TSH SERPL DL<=0.005 MIU/L-ACNC: 1.41 UIU/ML (ref 0.45–4.5)

## 2020-07-24 ENCOUNTER — VIRTUAL VISIT (OUTPATIENT)
Dept: ENDOCRINOLOGY | Age: 75
End: 2020-07-24

## 2020-07-24 DIAGNOSIS — E05.00 GRAVES DISEASE: Primary | ICD-10-CM

## 2020-07-24 NOTE — PATIENT INSTRUCTIONS
1) TSH is a thyroid test.  Your level is 1.4 which is normal and at goal of 0.45-2.0. This test goes opposite of your thyroid dose and suggests your dose of levothyroxine is perfect so I will keep your dose the same. 2) I will plan on seeing you back in one year but if you feel you need to have your labs checked sooner, please let me know. 3) I will mail you a lab slip. Please put this in the glove compartment or other safe spot where you keep your medical papers and I will send you a reminder to have your labs drawn prior to next visit. 4) Please come for a follow up visit on 7/23/21 at 11:30am in our [place] office.

## 2020-07-24 NOTE — PROGRESS NOTES
Chief Complaint   Patient presents with    Thyroid Problem     pcp and pharmacy confirmed       **THIS IS A VIRTUAL VISIT VIA A TELEPHONE ENCOUNTER. PATIENT AGREED TO HAVE THEIR CARE DELIVERED OVER THE PHONE IN PLACE OF THEIR REGULARLY SCHEDULED OFFICE VISIT**    History of Present Illness: Lauro Anderson is a 77 y.o. male here for follow up of thyroid. His cousin is Jayla Mcbride who is also my patient. No major change to health since last visit. About a month ago was able to wean off the Toprol XL and hasn't had any rise in BP off this and no new chest pain or palpitations. Taking the levothyroxine at bedtime and hasn't missed any doses or run out. Bowels are regular. Weight is 267 currently up from 248 in 7/19 as he has been less active and eating more during [place]. No heat or cold intolerance. No dry skin or brittle nails. Overall energy is about the same as last year. Not on any new vitamins or PPI. Current Outpatient Medications   Medication Sig    levothyroxine (SYNTHROID) 75 mcg tablet TAKE 1 TABLET BY MOUTH EVERY DAY BEFORE BREAKFAST    sodium bicarbonate 650 mg tablet Take 650 mg by mouth two (2) times a day.  OTHER,NON-FORMULARY, daily. Sylvester red supplement     furosemide (LASIX) 40 mg tablet Take  by mouth daily.  spironolactone (ALDACTONE) 25 mg tablet Take 1 Tab by mouth daily.  valsartan (DIOVAN) 80 mg tablet Take 1 Tab by mouth daily.  rivaroxaban (XARELTO) 20 mg tab tablet Take 20 mg by mouth daily. No current facility-administered medications for this visit. No Known Allergies     Review of Systems: PER HPI    Physical Examination: NOT PERFORMED DUE TO THIS BEING A TELEPHONE CALL      Data Reviewed:   Component      Latest Ref Rng & Units 7/20/2020 7/20/2020           2:46 PM  2:46 PM   T4, Free      0.82 - 1.77 ng/dL  1.14   TSH      0.450 - 4.500 uIU/mL 1.410        Assessment/Plan:     1.  Grave's disease: Was seen for hospital consultation in Jan 2018 and his TSH was < 0.01 and FT4 was > 8.0 and T3 was 229 and TRAb was 1.98. I initially started him on methimazole 20 mg bid but he called our office 2 weeks later and was not feeling better so I increased his dose to 30 mg bid and his TSH was still < 0.006 but FT4 down to 2.83 and T3 normal at 115 in 3/18. I explained that the TSH takes longer to normalize so I kept his dose the same. TSH up to 9.67 and FT4 down to 1.17 on 4/17/18 so decreased dose to 20 mg bid. Repeat TSH up to 43 and FT4 down to 0.88 in 5/18 so cut back to 20 mg daily. Called us on 6/1/18 with continued shortness of breath and I recommended decreasing to 10 mg daily until he had labs drawn on 6/4/18 that showed his TSH up to 63 and FT4 down to 0.57 and I had him stop the methimazole the next day. I'm wondering whether the amiodarone that he was on until 2/18 could have been precipitating some of the hyperthyroidism and now that he has been off this the past 3 months, if he became very sensitive to the methimazole and this has led to the profound hypothyroidism that is the likely cause of a lot of his symptoms including the dyspnea and new onset ascites. I kept him off the methimazole and instead began a course of unithroid 50 mcg at bedtime on 6/8/18 to see if I can bring his TSH down and FT4 up and TSH down to 31 and FT4 up to 1.1 on 6/21/18 and kept him on this dose and TSH down to 17 and FT4 up to 1.26 on 7/2/18. I further increased his dose to 75 mcg daily to try and normalize his TSH and it was down to 2.82 in 8/18. I kept his dose the same and changed to generic levothyroxine 75 mcg daily for cost and TSH 2.18 in 9/18 and 2.16 in 11/18 and 1.92 in 7/19 and 1.41 in 7/20 so kept dose the same and will see him annually at this point. - cont levothyroxine 75 mcg at bedtime  - check TSH and free T4 prior to next visit       Patient Instructions   1) TSH is a thyroid test.  Your level is 1.4 which is normal and at goal of 0.45-2.0.   This test goes opposite of your thyroid dose and suggests your dose of levothyroxine is perfect so I will keep your dose the same. 2) I will plan on seeing you back in one year but if you feel you need to have your labs checked sooner, please let me know. 3) I will mail you a lab slip. Please put this in the glove compartment or other safe spot where you keep your medical papers and I will send you a reminder to have your labs drawn prior to next visit. 4) Please come for a follow up visit on 7/23/21 at 11:30am in our [place] office. We spent 12 minutes of total time together during this virtual visit with a telephone encounter. All questions were answered and a copy of the instructions were sent to him via [employer]. Copy sent to:   Peri Meadows MD as PCP - General (Family Practice)  Dr. Artie Loaiza MD (Gastroenterology)  Dr. Sejal Warren

## 2020-10-13 RX ORDER — LEVOTHYROXINE SODIUM 75 UG/1
TABLET ORAL
Qty: 90 TAB | Refills: 3 | Status: SHIPPED | OUTPATIENT
Start: 2020-10-13 | End: 2021-08-21

## 2020-11-16 ENCOUNTER — OFFICE VISIT (OUTPATIENT)
Dept: URGENT CARE | Age: 75
End: 2020-11-16
Payer: MEDICARE

## 2020-11-16 VITALS — RESPIRATION RATE: 15 BRPM | OXYGEN SATURATION: 96 % | TEMPERATURE: 98.5 F | HEART RATE: 72 BPM

## 2020-11-16 DIAGNOSIS — Z20.822 EXPOSURE TO COVID-19 VIRUS: Primary | ICD-10-CM

## 2020-11-16 PROCEDURE — G8432 DEP SCR NOT DOC, RNG: HCPCS | Performed by: NURSE PRACTITIONER

## 2020-11-16 PROCEDURE — 99202 OFFICE O/P NEW SF 15 MIN: CPT | Performed by: NURSE PRACTITIONER

## 2020-11-16 PROCEDURE — 1101F PT FALLS ASSESS-DOCD LE1/YR: CPT | Performed by: NURSE PRACTITIONER

## 2020-11-16 PROCEDURE — G8421 BMI NOT CALCULATED: HCPCS | Performed by: NURSE PRACTITIONER

## 2020-11-16 PROCEDURE — G8536 NO DOC ELDER MAL SCRN: HCPCS | Performed by: NURSE PRACTITIONER

## 2020-11-16 PROCEDURE — G8427 DOCREV CUR MEDS BY ELIG CLIN: HCPCS | Performed by: NURSE PRACTITIONER

## 2020-11-16 PROCEDURE — 3017F COLORECTAL CA SCREEN DOC REV: CPT | Performed by: NURSE PRACTITIONER

## 2020-11-16 NOTE — PROGRESS NOTES
Subjective: (As above and below)       This patient was seen in [facility] at [address] [place] [address] [facility]gent [facility] while in their vehicle due to COVID-19 pandemic with PPE and focused examination in order to decrease community viral transmission. The patient/guardian gave verbal consent to treat. Chief Complaint   Patient presents with    Covid Testing     possible exposure; denies any symptoms at this time. Kalyani Cota is a 77 y.o. male who presents for COVID-19 Exposure and testing. Known contact with: covid 19 positive individual  Currently has not tried any therapies and denies any symptoms at this point in time. Feels well otherwise. Recent travel: no  Known Exposure to COVID-19: YES  Known flu or strep contact: no         ROS- negative for dizziness, cough, SOB, rhinorrhea, myalgias, n/v/d, rashes, headaches, fevers, chills, chest pains. Review of Systems - negative except as listed above    Reviewed PmHx, RxHx, FmHx, SocHx, AllgHx and updated in chart.   Family History   Problem Relation Age of Onset    Cancer Mother         throat cancer    COPD Mother     Cancer Father         kidney cancer    Other Father         neck fx and paraplegia    Hypertension Sister     Arthritis-osteo Sister     Stroke Child        Past Medical History:   Diagnosis Date    Arrhythmia     afib    Arthritis     [facility]    Dr Ricardo Morrissey [phone]    Hypertension     Hyperthyroidism 1/23/2018    Hypertrophy (benign) of prostate     Th[facility] 2003    s/p Lt knee surgery (was on Coumadin x3 yr)      Social History     Socioeconomic History    Marital status:      Spouse name: Not on file    Number of children: Not on file    Years of education: Not on file    Highest education level: Not on file   Tobacco Use    Smoking status: Former Smoker     Packs/day: 1.00     Years: 30.00     Pack years: 30.00     Last attempt to quit: 5/23/2004 Years since quittin.4    Smokeless tobacco: Never Used   Substance and Sexual Activity    Alcohol use: No     Alcohol/week: 0.0 standard drinks     Comment: no alcohol for the pask 6 months    Drug use: No          Current Outpatient Medications   Medication Sig    levothyroxine (SYNTHROID) 75 mcg tablet TAKE 1 TABLET BY MOUTH EVERY DAY BEFORE BREAKFAST    sodium bicarbonate 650 mg tablet Take 650 mg by mouth two (2) times a day.  OTHER,NON-FORMULARY, daily. Sylvester red supplement     furosemide (LASIX) 40 mg tablet Take  by mouth daily.  spironolactone (ALDACTONE) 25 mg tablet Take 1 Tab by mouth daily.  valsartan (DIOVAN) 80 mg tablet Take 1 Tab by mouth daily.  rivaroxaban (XARELTO) 20 mg tab tablet Take 20 mg by mouth daily. No current facility-administered medications for this visit. Objective:     Vitals:    20 1449   Pulse: 72   Resp: 15   Temp: 98.5 °F (36.9 °C)   SpO2: 96%       Physical Exam  General appearance  appears well hydrated and does not appear toxic, no acute distress  Eyes - EOMs intact. Non injected. No scleral icterus   Ears - no external swelling  Nose - No purulent drainage  Neck/Lymphatics  trachea midline, full AROM  Chest - normal breathing effort. No active cough heard. No audible wheezing. Heart - HR-see vitals  Skin - no observable rashes or pallor  Neurologic- alert and oriented x 3  Psychiatric- normal mood, behavior and though content. Assessment/ Plan:     1. Exposure to COVID-19 virus    - NOVEL CORONAVIRUS (COVID-19)      Will test for COVID-19 given exposure  Supportive home care reviewed for any development of mild symptoms - tylenol, maintain adequate fluid intake, deep breathing exercises  Self isolate/quarantine advised based on recommendations in current CDC guidelines. Follow up:   We have reviewed, in detail, particular presentations/worsening/concerning signs and symptoms that may warrant seeking immediate care in the ED setting and patient verbalized being aware of what to watch for. For other non-severe changes, non-improvement or questions, patient aware to contact PCP office or consider a virtual online medical consultation. Reviewed with him that COVID-19 pandemic is an evolving situation with rapidly changing recommendations & guidelines. Medical decisions are made based on the the best information available at the time.   Recommended he stay tuned for updates published by trusted sources and to advise your PCP of any unexpected changes in clinical condition     Karen Stern, NP

## 2020-11-18 LAB — SARS-COV-2, NAA: NOT DETECTED

## 2021-07-09 DIAGNOSIS — E05.00 GRAVES DISEASE: ICD-10-CM

## 2021-07-15 LAB
T4 FREE SERPL-MCNC: 1.28 NG/DL (ref 0.82–1.77)
TSH SERPL DL<=0.005 MIU/L-ACNC: 1.09 UIU/ML (ref 0.45–4.5)

## 2021-07-23 ENCOUNTER — OFFICE VISIT (OUTPATIENT)
Dept: ENDOCRINOLOGY | Age: 76
End: 2021-07-23
Payer: MEDICARE

## 2021-07-23 VITALS
HEIGHT: 76 IN | SYSTOLIC BLOOD PRESSURE: 100 MMHG | DIASTOLIC BLOOD PRESSURE: 69 MMHG | WEIGHT: 250 LBS | HEART RATE: 80 BPM | BODY MASS INDEX: 30.44 KG/M2

## 2021-07-23 DIAGNOSIS — E05.00 GRAVES DISEASE: Primary | ICD-10-CM

## 2021-07-23 PROCEDURE — 99213 OFFICE O/P EST LOW 20 MIN: CPT | Performed by: INTERNAL MEDICINE

## 2021-07-23 PROCEDURE — G8417 CALC BMI ABV UP PARAM F/U: HCPCS | Performed by: INTERNAL MEDICINE

## 2021-07-23 PROCEDURE — G8432 DEP SCR NOT DOC, RNG: HCPCS | Performed by: INTERNAL MEDICINE

## 2021-07-23 PROCEDURE — 1101F PT FALLS ASSESS-DOCD LE1/YR: CPT | Performed by: INTERNAL MEDICINE

## 2021-07-23 PROCEDURE — G8427 DOCREV CUR MEDS BY ELIG CLIN: HCPCS | Performed by: INTERNAL MEDICINE

## 2021-07-23 PROCEDURE — 3017F COLORECTAL CA SCREEN DOC REV: CPT | Performed by: INTERNAL MEDICINE

## 2021-07-23 PROCEDURE — G8536 NO DOC ELDER MAL SCRN: HCPCS | Performed by: INTERNAL MEDICINE

## 2021-07-23 RX ORDER — ALLOPURINOL 100 MG/1
TABLET ORAL
COMMUNITY
Start: 2021-04-27

## 2021-07-23 RX ORDER — GEMFIBROZIL 600 MG/1
TABLET, FILM COATED ORAL
COMMUNITY
Start: 2021-07-04

## 2021-07-23 RX ORDER — SILDENAFIL CITRATE 20 MG/1
TABLET ORAL
COMMUNITY
Start: 2021-04-28

## 2021-07-23 NOTE — PATIENT INSTRUCTIONS
1) Your TSH is a thyroid test.  Your level is 1.09 which is normal and at goal of 0.45-2.0. This test goes opposite of your thyroid dose and suggests your dose of levothyroxine is perfect so I will keep your dose the same. 2) Given you have been stable the past few years on this dose, I will not have you make a follow up appointment with me and instead you can have Dr. Sam Jones check your labs once a year to make sure everything is stable. 3) You have refills until October so at that point, if you can have Dr. Sam Jones take over the refills that would be great. 4) If you feel you need to have your labs checked sooner, please let me know and I can help with this.

## 2021-07-23 NOTE — PROGRESS NOTES
Chief Complaint   Patient presents with    Thyroid Problem     History of Present Illness: Nissa Mckinney is a 77 y.o. male here for follow up of thyroid. Weight down 17 lbs since last visit in 7/20. No major change to health since last visit. His wife had a pacemaker placed for a-fib after 2 ablations so this has been stressful. Still taking levothyroxine at bedtime and gets this every night. Bowels are regular. No heat or cold intolerance. Overall energy is sometimes not so good but attributes this to his heart. Has never had any recurrence of ascites over the past few years. No chest pain or palpitations or tremors. Willing to just follow up with PCP going forward. Current Outpatient Medications   Medication Sig    allopurinoL (ZYLOPRIM) 100 mg tablet TAKE 1 TABLET BY MOUTH EVERY MORNING    gemfibroziL (LOPID) 600 mg tablet TAKE 1 TABLET BY MOUTH 30 MINS BEFORE MORNING AND EVENING MEALS    levothyroxine (SYNTHROID) 75 mcg tablet TAKE 1 TABLET BY MOUTH EVERY DAY BEFORE BREAKFAST    sodium bicarbonate 650 mg tablet Take 650 mg by mouth two (2) times a day.  furosemide (LASIX) 40 mg tablet Take  by mouth daily.  spironolactone (ALDACTONE) 25 mg tablet Take 1 Tab by mouth daily.  valsartan (DIOVAN) 80 mg tablet Take 1 Tab by mouth daily.  rivaroxaban (XARELTO) 20 mg tab tablet Take 20 mg by mouth daily.  sildenafiL, pulmonary hypertension, (REVATIO) 20 mg tablet TAKE 1 5 TABLETS ONCE DAILY AS NEEDED, USE THE LOWEST EFFECTIVE DOSE     No current facility-administered medications for this visit. No Known Allergies     Review of Systems: PER HPI    Physical Examination:  Blood pressure 100/69, pulse 80, height 6' 4\" (1.93 m), weight 250 lb (113.4 kg).   - General: pleasant, no distress, good eye contact   - Neck: small goiter  - Cardiovascular: regular, normal rate, nl s1 and s2, no m/r/g   - Respiratory: clear to auscultation bilaterally   - Integumentary: skin is normal, no edema  - Neurological: reflexes 2+ at biceps, no tremors  - Psychiatric: normal mood and affect    Data Reviewed:   Component      Latest Ref Rng & Units 7/14/2021 7/14/2021           3:00 PM  3:00 PM   T4, Free      0.82 - 1.77 ng/dL  1.28   TSH      0.450 - 4.500 uIU/mL 1.090        Assessment/Plan:     1. Grave's disease: Was seen for hospital consultation in Jan 2018 and his TSH was < 0.01 and FT4 was > 8.0 and T3 was 229 and TRAb was 1.98. I initially started him on methimazole 20 mg bid but he called our office 2 weeks later and was not feeling better so I increased his dose to 30 mg bid and his TSH was still < 0.006 but FT4 down to 2.83 and T3 normal at 115 in 3/18. I explained that the TSH takes longer to normalize so I kept his dose the same. TSH up to 9.67 and FT4 down to 1.17 on 4/17/18 so decreased dose to 20 mg bid. Repeat TSH up to 43 and FT4 down to 0.88 in 5/18 so cut back to 20 mg daily. Called us on 6/1/18 with continued shortness of breath and I recommended decreasing to 10 mg daily until he had labs drawn on 6/4/18 that showed his TSH up to 63 and FT4 down to 0.57 and I had him stop the methimazole the next day. I'm wondering whether the amiodarone that he was on until 2/18 could have been precipitating some of the hyperthyroidism and now that he has been off this the past 3 months, if he became very sensitive to the methimazole and this has led to the profound hypothyroidism that is the likely cause of a lot of his symptoms including the dyspnea and new onset ascites. I kept him off the methimazole and instead began a course of unithroid 50 mcg at bedtime on 6/8/18 to see if I can bring his TSH down and FT4 up and TSH down to 31 and FT4 up to 1.1 on 6/21/18 and kept him on this dose and TSH down to 17 and FT4 up to 1.26 on 7/2/18. I further increased his dose to 75 mcg daily to try and normalize his TSH and it was down to 2.82 in 8/18.   I kept his dose the same and changed to generic levothyroxine 75 mcg daily for cost and TSH 2.18 in 9/18 and 2.16 in 11/18 and 1.92 in 7/19 and 1.41 in 7/20 and 1.09 in 7/21 so kept dose the same and he can f/u with his PCP at this point. - cont levothyroxine 75 mcg at bedtime  - check TSH and free T4 annually with PCP       Patient Instructions   1) Your TSH is a thyroid test.  Your level is 1.09 which is normal and at goal of 0.45-2.0. This test goes opposite of your thyroid dose and suggests your dose of levothyroxine is perfect so I will keep your dose the same. 2) Given you have been stable the past few years on this dose, I will not have you make a follow up appointment with me and instead you can have Dr. Estevan Mackay check your labs once a year to make sure everything is stable. 3) You have refills until October so at that point, if you can have Dr. Estevan Mackay take over the refills that would be great. 4) If you feel you need to have your labs checked sooner, please let me know and I can help with this. Follow-up and Dispositions    · Return if symptoms worsen or fail to improve. Copy sent to:   Rommel Zamora MD as PCP - [facility])  Dr. Yolanda Hickey

## 2021-08-21 RX ORDER — LEVOTHYROXINE SODIUM 75 UG/1
75 TABLET ORAL
Qty: 90 TABLET | Refills: 0 | Status: SHIPPED | OUTPATIENT
Start: 2021-08-21 | End: 2021-12-21

## 2021-08-24 ENCOUNTER — TELEPHONE (OUTPATIENT)
Dept: ENDOCRINOLOGY | Age: 76
End: 2021-08-24

## 2021-08-24 NOTE — TELEPHONE ENCOUNTER
Please call pt to let him know he has an unread message in [address] (please read to him):    I received a refill of your levothyroxine and approved it for 90 days with no refills.  Please contact Dr. Carmen Zuluaga to take over all future refills of this medication. Julien Hayward so much!

## 2021-08-24 NOTE — TELEPHONE ENCOUNTER
----- Message from Yaneli Feldman sent at 8/24/2021 12:01 PM EDT -----  Regarding: Dr Felix  Patient return call    Caller's first and last name and relationship (if not the patient):      Best contact number(s):[phone]      Whose call is being returned:Tameka      Akiko to clarify the request:regarding his medication refill      Yaneli Gave

## 2022-02-03 ENCOUNTER — TELEPHONE (OUTPATIENT)
Dept: ENDOCRINOLOGY | Age: 77
End: 2022-02-03

## 2022-02-04 RX ORDER — LEVOTHYROXINE SODIUM 75 UG/1
75 TABLET ORAL
Qty: 90 TABLET | Refills: 0 | OUTPATIENT
Start: 2022-02-04

## 2022-02-04 NOTE — TELEPHONE ENCOUNTER
Please call him to let him know I keep getting refill request for his levothyroxine and back in August we had asked him to have Dr. Little Duke take over this prescription. Please have him call Dr. Little Duke to send in a prescription now so it's under his name going forward. Thanks.

## 2022-03-18 PROBLEM — R53.1 WEAKNESS: Status: ACTIVE | Noted: 2018-02-13

## 2022-03-18 PROBLEM — I48.19 ATRIAL FIBRILLATION, PERSISTENT (HCC): Status: ACTIVE | Noted: 2018-02-26

## 2022-03-19 PROBLEM — R63.4 WEIGHT LOSS: Status: ACTIVE | Noted: 2018-01-12

## 2022-03-19 PROBLEM — G73.7: Status: ACTIVE | Noted: 2018-02-13

## 2022-03-19 PROBLEM — E53.8 B12 DEFICIENCY: Status: ACTIVE | Noted: 2018-02-13

## 2022-03-19 PROBLEM — E88.9 METABOLIC MYOPATHY: Status: ACTIVE | Noted: 2018-02-13

## 2022-03-19 PROBLEM — E55.9 VITAMIN D DEFICIENCY: Status: ACTIVE | Noted: 2018-02-13

## 2022-03-19 PROBLEM — E05.00 GRAVES DISEASE: Status: ACTIVE | Noted: 2018-01-23

## 2022-03-19 PROBLEM — I48.91 ATRIAL FIBRILLATION WITH RVR (HCC): Status: ACTIVE | Noted: 2018-01-12

## 2022-03-19 PROBLEM — R13.12 OROPHARYNGEAL DYSPHAGIA: Status: ACTIVE | Noted: 2018-02-13

## 2022-03-19 PROBLEM — G73.7 METABOLIC MYOPATHY: Status: ACTIVE | Noted: 2018-02-13

## 2022-03-19 PROBLEM — E05.90: Status: ACTIVE | Noted: 2018-02-13

## 2022-05-10 ENCOUNTER — HOSPITAL ENCOUNTER (OUTPATIENT)
Age: 77
Setting detail: OUTPATIENT SURGERY
Discharge: HOME | End: 2022-05-10
Attending: INTERNAL MEDICINE | Admitting: INTERNAL MEDICINE
Payer: MEDICARE

## 2022-05-10 ENCOUNTER — ANESTHESIA (OUTPATIENT)
Dept: CARDIAC CATH/INVASIVE PROCEDURES | Age: 77
End: 2022-05-10
Payer: MEDICARE

## 2022-05-10 ENCOUNTER — ANESTHESIA EVENT (OUTPATIENT)
Dept: CARDIAC CATH/INVASIVE PROCEDURES | Age: 77
End: 2022-05-10
Payer: MEDICARE

## 2022-05-10 VITALS
RESPIRATION RATE: 15 BRPM | HEIGHT: 76 IN | OXYGEN SATURATION: 96 % | HEART RATE: 83 BPM | DIASTOLIC BLOOD PRESSURE: 90 MMHG | WEIGHT: 250 LBS | SYSTOLIC BLOOD PRESSURE: 147 MMHG | BODY MASS INDEX: 30.44 KG/M2 | TEMPERATURE: 97.6 F

## 2022-05-10 DIAGNOSIS — Z45.02 ICD (IMPLANTABLE CARDIOVERTER-DEFIBRILLATOR) BATTERY DEPLETION: ICD-10-CM

## 2022-05-10 PROCEDURE — C1882 AICD, OTHER THAN SING/DUAL: HCPCS | Performed by: INTERNAL MEDICINE

## 2022-05-10 PROCEDURE — 33264 RMVL & RPLCMT DFB GEN MLT LD: CPT | Performed by: INTERNAL MEDICINE

## 2022-05-10 PROCEDURE — 74011250636 HC RX REV CODE- 250/636: Performed by: NURSE ANESTHETIST, CERTIFIED REGISTERED

## 2022-05-10 PROCEDURE — 77030037400 HC ADH TISS HI VISC EXOFIN CHMP -B: Performed by: INTERNAL MEDICINE

## 2022-05-10 PROCEDURE — 2709999900 HC NON-CHARGEABLE SUPPLY: Performed by: INTERNAL MEDICINE

## 2022-05-10 PROCEDURE — 74011000258 HC RX REV CODE- 258: Performed by: NURSE ANESTHETIST, CERTIFIED REGISTERED

## 2022-05-10 PROCEDURE — 74011250636 HC RX REV CODE- 250/636: Performed by: INTERNAL MEDICINE

## 2022-05-10 PROCEDURE — 76060000033 HC ANESTHESIA 1 TO 1.5 HR: Performed by: INTERNAL MEDICINE

## 2022-05-10 PROCEDURE — 77030031139 HC SUT VCRL2 J&J -A: Performed by: INTERNAL MEDICINE

## 2022-05-10 PROCEDURE — 77030018729 HC ELECTRD DEFIB PAD CARD -B: Performed by: INTERNAL MEDICINE

## 2022-05-10 PROCEDURE — 77030039266 HC ADH SKN EXOFIN S2SG -A: Performed by: INTERNAL MEDICINE

## 2022-05-10 PROCEDURE — 74011000272 HC RX REV CODE- 272: Performed by: INTERNAL MEDICINE

## 2022-05-10 PROCEDURE — 77030002996 HC SUT SLK J&J -A: Performed by: INTERNAL MEDICINE

## 2022-05-10 PROCEDURE — C1781 MESH (IMPLANTABLE): HCPCS | Performed by: INTERNAL MEDICINE

## 2022-05-10 PROCEDURE — 74011000250 HC RX REV CODE- 250: Performed by: INTERNAL MEDICINE

## 2022-05-10 PROCEDURE — 77030028698 HC BLD TISS PLSM MEDT -D: Performed by: INTERNAL MEDICINE

## 2022-05-10 PROCEDURE — 74011000250 HC RX REV CODE- 250: Performed by: NURSE ANESTHETIST, CERTIFIED REGISTERED

## 2022-05-10 DEVICE — HF CARDIAC RESYNCHRONISATION THERAPY DEFIBRILLATOR VVED DDDRV
Type: IMPLANTABLE DEVICE | Status: FUNCTIONAL
Brand: GALLANT™

## 2022-05-10 DEVICE — ENVELOPE CMRM6133 ABSORB LRG MR
Type: IMPLANTABLE DEVICE | Status: FUNCTIONAL
Brand: TYRX™

## 2022-05-10 RX ORDER — ONDANSETRON 2 MG/ML
INJECTION INTRAMUSCULAR; INTRAVENOUS AS NEEDED
Status: DISCONTINUED | OUTPATIENT
Start: 2022-05-10 | End: 2022-05-10 | Stop reason: HOSPADM

## 2022-05-10 RX ORDER — SODIUM CHLORIDE 9 MG/ML
INJECTION, SOLUTION INTRAVENOUS
Status: DISCONTINUED | OUTPATIENT
Start: 2022-05-10 | End: 2022-05-10 | Stop reason: HOSPADM

## 2022-05-10 RX ORDER — PROPOFOL 10 MG/ML
INJECTION, EMULSION INTRAVENOUS
Status: DISCONTINUED | OUTPATIENT
Start: 2022-05-10 | End: 2022-05-10 | Stop reason: HOSPADM

## 2022-05-10 RX ORDER — FENTANYL CITRATE 50 UG/ML
INJECTION, SOLUTION INTRAMUSCULAR; INTRAVENOUS AS NEEDED
Status: DISCONTINUED | OUTPATIENT
Start: 2022-05-10 | End: 2022-05-10 | Stop reason: HOSPADM

## 2022-05-10 RX ORDER — LIDOCAINE HYDROCHLORIDE AND EPINEPHRINE 10; 10 MG/ML; UG/ML
INJECTION, SOLUTION INFILTRATION; PERINEURAL AS NEEDED
Status: DISCONTINUED | OUTPATIENT
Start: 2022-05-10 | End: 2022-05-10 | Stop reason: HOSPADM

## 2022-05-10 RX ORDER — PROPOFOL 10 MG/ML
INJECTION, EMULSION INTRAVENOUS AS NEEDED
Status: DISCONTINUED | OUTPATIENT
Start: 2022-05-10 | End: 2022-05-10 | Stop reason: HOSPADM

## 2022-05-10 RX ORDER — LIDOCAINE HYDROCHLORIDE 20 MG/ML
INJECTION, SOLUTION EPIDURAL; INFILTRATION; INTRACAUDAL; PERINEURAL AS NEEDED
Status: DISCONTINUED | OUTPATIENT
Start: 2022-05-10 | End: 2022-05-10 | Stop reason: HOSPADM

## 2022-05-10 RX ORDER — CEFAZOLIN SODIUM 1 G/3ML
INJECTION, POWDER, FOR SOLUTION INTRAMUSCULAR; INTRAVENOUS AS NEEDED
Status: DISCONTINUED | OUTPATIENT
Start: 2022-05-10 | End: 2022-05-10 | Stop reason: HOSPADM

## 2022-05-10 RX ORDER — GLYCOPYRROLATE 0.2 MG/ML
INJECTION INTRAMUSCULAR; INTRAVENOUS AS NEEDED
Status: DISCONTINUED | OUTPATIENT
Start: 2022-05-10 | End: 2022-05-10 | Stop reason: HOSPADM

## 2022-05-10 RX ORDER — DEXMEDETOMIDINE HYDROCHLORIDE 100 UG/ML
INJECTION, SOLUTION INTRAVENOUS AS NEEDED
Status: DISCONTINUED | OUTPATIENT
Start: 2022-05-10 | End: 2022-05-10 | Stop reason: HOSPADM

## 2022-05-10 RX ADMIN — GLYCOPYRROLATE 0.2 MG: 0.2 INJECTION, SOLUTION INTRAMUSCULAR; INTRAVENOUS at 08:51

## 2022-05-10 RX ADMIN — FENTANYL CITRATE 50 MCG: 50 INJECTION, SOLUTION INTRAMUSCULAR; INTRAVENOUS at 09:19

## 2022-05-10 RX ADMIN — ONDANSETRON HYDROCHLORIDE 4 MG: 2 INJECTION, SOLUTION INTRAMUSCULAR; INTRAVENOUS at 08:51

## 2022-05-10 RX ADMIN — PROPOFOL 10 MG: 10 INJECTION, EMULSION INTRAVENOUS at 09:28

## 2022-05-10 RX ADMIN — PROPOFOL 50 MCG/KG/MIN: 10 INJECTION, EMULSION INTRAVENOUS at 08:51

## 2022-05-10 RX ADMIN — FENTANYL CITRATE 50 MCG: 50 INJECTION, SOLUTION INTRAMUSCULAR; INTRAVENOUS at 08:51

## 2022-05-10 RX ADMIN — LIDOCAINE HYDROCHLORIDE 20 MG: 20 INJECTION, SOLUTION INTRAVENOUS at 08:51

## 2022-05-10 RX ADMIN — SODIUM CHLORIDE: 9 INJECTION, SOLUTION INTRAVENOUS at 08:35

## 2022-05-10 RX ADMIN — DEXMEDETOMIDINE HYDROCHLORIDE 8 MCG: 100 INJECTION, SOLUTION, CONCENTRATE INTRAVENOUS at 08:51

## 2022-05-10 RX ADMIN — DEXMEDETOMIDINE HYDROCHLORIDE 8 MCG: 100 INJECTION, SOLUTION, CONCENTRATE INTRAVENOUS at 09:20

## 2022-05-10 RX ADMIN — CEFAZOLIN 2 G: 1 INJECTION, POWDER, FOR SOLUTION INTRAMUSCULAR; INTRAVENOUS; PARENTERAL at 08:57

## 2022-05-10 RX ADMIN — PROPOFOL 30 MG: 10 INJECTION, EMULSION INTRAVENOUS at 09:20

## 2022-05-10 NOTE — PROGRESS NOTES
TRANSFER - IN REPORT:    Verbal report received from [id number] and [facility] on Heather Talavera  being received from ep lab for routine progression of care. Report consisted of patients Situation, Background, Assessment and Recommendations(SBAR). Information from the following report(s) SBAR was reviewed with the receiving clinician. Opportunity for questions and clarification was provided. Assessment completed upon patients arrival to [address] [place] [address] and care assumed. Cardiac Cath Lab Recovery Arrival Note:    Heather Talavera arrived to [facility] recovery area. Patient procedure= heart cath. Patient on cardiac monitor, non-invasive blood pressure, SPO2 monitor. On   O2 @ 2 lpm via NC.  IV  of nacl on pump at kvo ml/hr. Patient status doing well without problems. Patient is A&Ox 4. Patient reports no complaints. PROCEDURE SITE CHECK:    Procedure site:without any bleeding and no hematoma, no pain/discomfort reported at procedure site. No change in patient status. Continue to monitor patient and status.

## 2022-05-10 NOTE — PROGRESS NOTES
Cardiac Cath Lab Recovery Arrival Note:      Joseline Guaman arrived to Cardiac Cath Lab, Recovery Area. Staff introduced to patient. Patient identifiers verified with NAME and DATE OF BIRTH. Procedure verified with patient. Consent forms reviewed and signed by patient or authorized representative and verified. Allergies verified. Patient and family oriented to department. Patient and family informed of procedure and plan of care. Questions answered with review. Patient prepped for procedure, per orders from physician, prior to arrival.    Patient on cardiac monitor, non-invasive blood pressure, SPO2 monitor. On RA. Patient is A&Ox 4. Patient reports no complaints. Patient in stretcher, in low position, with side rails up, call bell within reach, patient instructed to call if assistance as needed. Patient prep in: [address], [place]. Patient family has pager # none  Family in: no family; wife is planning to  .    Prep by: Melissa Nicole and Twila Bales

## 2022-05-10 NOTE — ANESTHESIA PREPROCEDURE EVALUATION
Anesthetic History   No history of anesthetic complications            Review of Systems / Medical History  Patient summary reviewed, nursing notes reviewed and pertinent labs reviewed    Pulmonary          Smoker (30 pk yrs)         Neuro/Psych   Within defined limits           Cardiovascular    Hypertension        Dysrhythmias : atrial fibrillation  Pacemaker    Exercise tolerance: >4 METS  Comments: Echo in 2015 showed EF 55-65% with mod MR           GI/Hepatic/Renal  Within defined limits              Endo/Other      Hypothyroidism  Obesity and arthritis     Other Findings   Comments: Back surgery           Physical Exam    Airway  Mallampati: II  TM Distance: 4 - 6 cm  Neck ROM: normal range of motion   Mouth opening: Normal     Cardiovascular    Rhythm: irregular  Rate: normal         Dental    Dentition: Upper partial plate and Lower partial plate  Comments: Numerous mission teeth , the remaining teeth in poor condition.    Pulmonary  Breath sounds clear to auscultation               Abdominal  GI exam deferred       Other Findings            Anesthetic Plan    ASA: 3  Anesthesia type: MAC            Anesthetic plan and risks discussed with: Patient

## 2022-05-10 NOTE — PROGRESS NOTES
Primary Nurse Rosette Issa and Jess Fang RN performed a dual skin assessment on this patient No impairment noted  Tr score is 23; pt refused meplix

## 2022-05-10 NOTE — H&P
EP/ ARRHYTHMIA    Patient ID:  Patient: Roberto Powell  MRN: [id number]  Age: 77 y.o.  : 1945    Date of  Admission: 5/10/2022  6:02 AM   PCP:  Lauri Rivera MD    Assessment: 1. Battery depletion. Plan:     1. Given the potential benefits, risks, and alternatives, he agrees to proceed with BIV-ICD generator change. 2. Resume anticoagulation post-procedure. Roberto Powell is a 77 y.o. male with a history of the above here for his procedure. He stopped Xarelto with last dose on Saturday (>48 hours ago).      Past Medical History:   Diagnosis Date    Arrhythmia     afib    Arthritis     Atrial fibrillation ([address] Dx 14    Dr Marly Ferrell [phone]    Hypertension     Hyperthyroidism 2018    Hypertrophy (benign) of prostate     Thromboembolus [facility]) 2003    s/p Lt knee surgery (was on Coumadin x3 yr)        Past Surgical History:   Procedure Laterality Date    COLONOSCOPY N/A 1/15/2018    COLON[facility]o [facility] at [facility]  1/15/2018         COLONOSCOPY,[address]OG[address]IO [address]  1/15/2018         HX ORTHOPAEDIC      back surgery no hardware    HX ORTHOPAEDIC      right knee surgery arthroscopy    HX ORTHOPAEDIC  3/23/15    REVISION TO LEFT TOTAL KNEE REPLACEMENT    HX PACEMAKER  2014    3 wire    [place] TOTAL KNEE ARTHROPLASTY      Left    [place] TOTAL KNEE ARTHROPLASTY  2009    Right    UPPER GI ENDOSCOPY,BIOPSY  1/15/2018            Social History     Tobacco Use    Smoking status: Former Smoker     Packs/day: 1.00     Years: 30.00     Pack years: 30.00     Quit date: 2004     Years since quittin.9    Smokeless tobacco: Never Used   Substance Use Topics    Alcohol use: No     Alcohol/week: 0.0 standard drinks     Comment: no alcohol for the pask 6 months        Family History   Problem Relation Age of Onset    Cancer Mother         throat cancer    COPD Mother     Cancer Father kidney cancer    Other Father         neck fx and paraplegia    Hypertension Sister     OSTEOARTHRITIS Sister     Stroke Child         No Known Allergies       No current facility-administered medications for this encounter. Facility-Administered Medications Ordered in Other Encounters   Medication Dose Route Frequency    fentaNYL citrate (PF) injection   IntraVENous PRN    dexmedeTOMidine (PRECEDEX) 100 mcg/mL iv solution   IntraVENous PRN    propofoL (DIPRIVAN) 10 mg/mL injection   IntraVENous CONTINUOUS    lidocaine (PF) (XYLOCAINE) 20 mg/mL (2 %) injection   IntraVENous PRN    glycopyrrolate (ROBINUL) injection   IntraVENous PRN    ondansetron (ZOFRAN) injection   IntraVENous PRN    0.9% sodium chloride infusion   IntraVENous CONTINUOUS       Review of Symptoms:  See OV. Objective:      Physical Exam:  Temp (24hrs), Av.8 °F (36.6 °C), Min:97.8 °F (36.6 °C), Max:97.8 °F (36.6 °C)    Patient Vitals for the past 8 hrs:   Pulse   05/10/22 0723 80   05/10/22 0713 82    Patient Vitals for the past 8 hrs:   Resp   05/10/22 0723 18   05/10/22 0713 23    Patient Vitals for the past 8 hrs:   BP   05/10/22 0713 (!) 187/122      No intake or output data in the 24 hours ending 05/10/22 0855    Nondiaphoretic, not in acute distress. Supple, no palpable thyromegaly. Neuro grossly nonfocal.  No tremor. Awake and appropriate.       Deanna Smiley MD  5/10/2022

## 2022-05-10 NOTE — PROGRESS NOTES
Patient ambulated in hallway with steady gait; no complaints of discomfort, dizziness, sob. Left incision site clean dry and intact. Discharge instructions reviewed with patient; answered questions as needed. Transported patient in wheelchair to car.

## 2022-05-10 NOTE — ANESTHESIA POSTPROCEDURE EVALUATION
Procedure(s):  REMOVE & REPLACE ICD BIV MULTI LEADS. MAC    Anesthesia Post Evaluation        Patient location during evaluation: bedside  Note status: Adequate. Level of consciousness: responsive to verbal stimuli and sleepy but conscious  Pain management: satisfactory to patient  Airway patency: patent  Anesthetic complications: no  Cardiovascular status: acceptable  Respiratory status: acceptable  Hydration status: acceptable  Comments: +Post-Anesthesia Evaluation and Assessment    Patient: Lukas Carson MRN: [id number]  SSN: [id number]   YOB: 1945  Age: 77 y.o. Sex: male      Cardiovascular Function/Vital Signs    BP (!) 135/97 (BP 1 Location: Right arm, BP Patient Position: At rest)   Pulse 80   Temp 36.4 °C (97.6 °F)   Resp 19   Ht 6' 4\" (1.93 m)   Wt 113.4 kg (250 lb)   SpO2 93%   BMI 30.43 kg/m²     Patient is status post Procedure(s):  REMOVE & REPLACE ICD BIV MULTI LEADS. Nausea/Vomiting: Controlled. Postoperative hydration reviewed and adequate. Pain:  Pain Scale 1: Numeric (0 - 10) (05/10/22 0950)  Pain Intensity 1: 0 (05/10/22 0950)   Managed. Neurological Status: At baseline. Mental Status and Level of Consciousness: Arousable. Pulmonary Status:   O2 Device: None (Room air) (05/10/22 0950)   Adequate oxygenation and airway patent. Complications related to anesthesia: None    Post-anesthesia assessment completed. No concerns. Signed By: Hemal Cotto MD    5/10/2022  Post anesthesia nausea and vomiting:  controlled      INITIAL Post-op Vital signs:   Vitals Value Taken Time   /90 05/10/22 0955   Temp 36.4 °C (97.6 °F) 05/10/22 0948   Pulse 80 05/10/22 0955   Resp 14 05/10/22 0955   SpO2 94 % 05/10/22 0955   Vitals shown include unvalidated device data.

## 2022-05-10 NOTE — PROGRESS NOTES
Brief Procedure Note    Patient: John Streeter MRN: [id number]  SSN: [id number]    YOB: 1945  Age: 77 y.o. Sex: male      Date of Procedure: 5/10/2022     Pre-procedure Diagnosis: Battery depletion    Post-procedure Diagnosis: As above    Procedure: BIV-ICD gen change    Performed By: Branden Landa MD     Anesthesia: Moderate Sedation    Estimated Blood Loss: Less than 10 mL      Specimens:  None    Findings: See note    Complications: None    Recommendations:  Resume Xarelto tomorrow.     Signed By: Branden Landa MD     May 10, 2022

## 2022-05-10 NOTE — Clinical Note
TRANSFER - OUT REPORT:     Verbal report given to: [facility] Recovery Area, (at bedside). Report consisted of patient's Situation, Background, Assessment and   Recommendations(SBAR). Opportunity for questions and clarification was provided. Patient transported with a Registered Nurse and Oxygen. Oxygen used for patient = nasal cannula, @ 2 - 6 Liters. Patient transported to: [address]

## 2022-05-10 NOTE — DISCHARGE INSTRUCTIONS
DISCHARGE INSTRUCTIONS    You had an Abbott BIV-ICD generator change today due to battery depletion. No change to medications. Resume your Xarelto tomorrow. 1. Remember to call for an appointment in 2-4 weeks [phone] to check healing and implant programming with Dr. Lucila Diaz nurse, Cydney Gay. 2. Medic Alert Bracelets are available from your pharmacist to wear at all times if you choose to wear one. 3. Carry your ID card for your ICD with you at all times. This card will be given to you in the hospital or mailed to you. 4. The ICD will bulge slightly under your skin. The bulge will decrease in size over the next few weeks. Please notify the doctor's office if you notice any of the following around your ICD site:   A.  A bruise that does not go away. B.  Soreness or yellow, green, or brown drainage from the site. C. Any swelling from the site. D. If you have a fever of 100 degrees or higher that lasts for a few days. INCISION CARE     1.  Leave the skin glue over your site until it dissolves on its own, usually in a few weeks. 2.  You may shower after 3 days as long as your incision isnt submerged or directly sprayed upon until well healed. 3.  For comfort, wear loose fitting clothing. 4.  Report any signs of infection, fever, pain, swelling, redness, oozing, or heat at site especially if these symptoms increase after the first 3 to 4 days. ACTIVITY PRECAUTIONS   1. Avoid rough contact with the implant site. 2. No driving for 14 days. 3. Avoid lifting your arm over your head, carrying anything on the affected side, or lifting over 10 pounds for 30 days. For the first 2 days only bend your arm at the elbow. 4. Any extreme activity such as golf, weight lifting or exercise biking should be restricted for 60 days. 5. Do not carry objects by holding them against your implant site. 6.  No shooting rifles or any type of gun with the affected shoulder permanently.   7.  Welding and chainsaws are prohibited. SPECIAL PRECAUTIONS  1. You should avoid all strong magnetic fields, such as arc welding, large transformers, large motors. Some ICD devices will beep if it detects a strong magnet. If this occurs, move out of the area. 2.  You may not have an MRI which uses a strong magnet to take pictures unless given the OK by a cardiologist.  3.  Treatments or surgery that requires diathermy or electrocautery should be discussed with your doctor before scheduled. 4.  Avoid radio frequency transmitters, including radar. 5.  Advise dentist or other medical personnel you see that you have an ICD. 6.  Cell phones and microwave oven use is okay. 7.  If you plan to move or take a trip to a new area, the doctor's office will give you a name of a doctor to contact for any problems. SPECIAL INSTRUCTIONS ON SHOCKS   1. Notify your doctor for any of the following:       A. Anytime a shock is received in a 24 hour period. An office visit is not usually required for a single shock. B.  Two or more shocks in a row. If you do not feel well, call the [facility], otherwise call your doctor. This may require an office visit. C. Two or more shocks spaced apart by several hours. This may require an office visit. 2.  Keep a record of events. Include date, time, symptoms and activity at that time. ANTIBIOTIC THERAPY  During the first 8 weeks after your ICD insertion, you may need antibiotics before any dental work or certain tests or operations. Let the dentist or doctor who is caring for you know that you have had an implanted device.     Signed By: Stephanie Rios MD     May 10, 2022

## 2022-12-06 ENCOUNTER — TELEPHONE (OUTPATIENT)
Dept: ENDOCRINOLOGY | Age: 77
End: 2022-12-06

## 2022-12-06 NOTE — TELEPHONE ENCOUNTER
Please let him know I reviewed his labs. TSH is a thyroid test.  Your level is 1.52 which is normal and at goal of 0.45-2.0. This test goes opposite of your thyroid dose and suggests your dose of levothyroxine is perfect so I will keep your dose the same and you can continue to follow up with your PCP. Your T3 of 72 is in the normal range and this test can vary based on the time of day it is drawn and I'm not concerned.

## 2022-12-06 NOTE — TELEPHONE ENCOUNTER
Pt called 12/6 @ 3:37 PM.    Pt stated he is an old patient of dr Fiona Vyas, he was doing well so dr Fiona Vyas sent him to his PCP to continue monitoring. He recently had labs done that showed his T3 was 72 and TSH was 6. 2. he was wondering if dr Fiona Vyas could take a look at these labs.     EI#[phone]

## 2023-10-05 ENCOUNTER — HOSPITAL ENCOUNTER (OUTPATIENT)
Facility: HOSPITAL | Age: 78
Discharge: HOME | End: 2023-10-05
Payer: MEDICARE

## 2023-10-05 ENCOUNTER — HOSPITAL ENCOUNTER (OUTPATIENT)
Facility: HOSPITAL | Age: 78
End: 2023-10-05
Payer: MEDICARE

## 2023-10-05 VITALS
OXYGEN SATURATION: 98 % | HEART RATE: 80 BPM | RESPIRATION RATE: 18 BRPM | BODY MASS INDEX: 30.54 KG/M2 | WEIGHT: 245.59 LBS | SYSTOLIC BLOOD PRESSURE: 113 MMHG | TEMPERATURE: 97.4 F | DIASTOLIC BLOOD PRESSURE: 81 MMHG | HEIGHT: 75 IN

## 2023-10-05 LAB
ANION GAP SERPL CALC-SCNC: 4 MMOL/L (ref 5–15)
APPEARANCE UR: CLEAR
BACTERIA URNS QL MICRO: NEGATIVE /HPF
BILIRUB UR QL: NEGATIVE
BUN SERPL-MCNC: 27 MG/DL (ref 6–20)
BUN/CREAT SERPL: 18 (ref 12–20)
CALCIUM SERPL-MCNC: 9.6 MG/DL (ref 8.5–10.1)
CHLORIDE SERPL-SCNC: 108 MMOL/L (ref 97–108)
CO2 SERPL-SCNC: 26 MMOL/L (ref 21–32)
COLOR UR: NORMAL
CREAT SERPL-MCNC: 1.49 MG/DL (ref 0.7–1.3)
EPITH CASTS URNS QL MICRO: NORMAL /LPF
ERYTHROCYTE [DISTWIDTH] IN BLOOD BY AUTOMATED COUNT: 13.2 % (ref 11.5–14.5)
GLUCOSE SERPL-MCNC: 116 MG/DL (ref 65–100)
GLUCOSE UR STRIP.AUTO-MCNC: NEGATIVE MG/DL
HCT VFR BLD AUTO: 43.8 % (ref 36.6–50.3)
HGB BLD-MCNC: 15 G/DL (ref 12.1–17)
HGB UR QL STRIP: NEGATIVE
HYALINE CASTS URNS QL MICRO: NORMAL /LPF (ref 0–2)
KETONES UR QL STRIP.AUTO: NEGATIVE MG/DL
LEUKOCYTE ESTERASE UR QL STRIP.AUTO: NEGATIVE
MAGNESIUM SERPL-MCNC: 2.5 MG/DL (ref 1.6–2.4)
MCH RBC QN AUTO: 33.2 PG (ref 26–34)
MCHC RBC AUTO-ENTMCNC: 34.2 G/DL (ref 30–36.5)
MCV RBC AUTO: 96.9 FL (ref 80–99)
NITRITE UR QL STRIP.AUTO: NEGATIVE
NRBC # BLD: 0 K/UL (ref 0–0.01)
NRBC BLD-RTO: 0 PER 100 WBC
PH UR STRIP: 6 (ref 5–8)
PLATELET # BLD AUTO: 226 K/UL (ref 150–400)
PMV BLD AUTO: 11 FL (ref 8.9–12.9)
POTASSIUM SERPL-SCNC: 4 MMOL/L (ref 3.5–5.1)
PROT UR STRIP-MCNC: NEGATIVE MG/DL
RBC # BLD AUTO: 4.52 M/UL (ref 4.1–5.7)
RBC #/AREA URNS HPF: NORMAL /HPF (ref 0–5)
SODIUM SERPL-SCNC: 138 MMOL/L (ref 136–145)
SP GR UR REFRACTOMETRY: 1.01
URINE CULTURE IF INDICATED: NORMAL
UROBILINOGEN UR QL STRIP.AUTO: 0.2 EU/DL (ref 0.2–1)
WBC # BLD AUTO: 5.5 K/UL (ref 4.1–11.1)
WBC URNS QL MICRO: NORMAL /HPF (ref 0–4)

## 2023-10-05 PROCEDURE — 85027 COMPLETE CBC AUTOMATED: CPT

## 2023-10-05 PROCEDURE — 81001 URINALYSIS AUTO W/SCOPE: CPT

## 2023-10-05 PROCEDURE — 83735 ASSAY OF MAGNESIUM: CPT

## 2023-10-05 PROCEDURE — 80048 BASIC METABOLIC PNL TOTAL CA: CPT

## 2023-10-05 PROCEDURE — 36415 COLL VENOUS BLD VENIPUNCTURE: CPT

## 2023-10-05 PROCEDURE — 71046 X-RAY EXAM CHEST 2 VIEWS: CPT

## 2023-10-06 LAB
EKG ATRIAL RATE: 38 BPM
EKG DIAGNOSIS: NORMAL
EKG Q-T INTERVAL: 444 MS
EKG QRS DURATION: 170 MS
EKG QTC CALCULATION (BAZETT): 512 MS
EKG R AXIS: 269 DEGREES
EKG T AXIS: 75 DEGREES
EKG VENTRICULAR RATE: 80 BPM

## 2023-10-12 ENCOUNTER — ANESTHESIA EVENT (OUTPATIENT)
Facility: HOSPITAL | Age: 78
DRG: 274 | End: 2023-10-12
Payer: MEDICARE

## 2023-10-12 ENCOUNTER — APPOINTMENT (OUTPATIENT)
Facility: HOSPITAL | Age: 78
DRG: 274 | End: 2023-10-12
Attending: INTERNAL MEDICINE
Payer: MEDICARE

## 2023-10-12 ENCOUNTER — ANESTHESIA (OUTPATIENT)
Facility: HOSPITAL | Age: 78
DRG: 274 | End: 2023-10-12
Payer: MEDICARE

## 2023-10-12 ENCOUNTER — HOSPITAL ENCOUNTER (INPATIENT)
Facility: HOSPITAL | Age: 78
LOS: 1 days | Discharge: HOME | DRG: 274 | End: 2023-10-13
Attending: INTERNAL MEDICINE | Admitting: INTERNAL MEDICINE
Payer: MEDICARE

## 2023-10-12 DIAGNOSIS — I48.91 ATRIAL FIBRILLATION (HCC): ICD-10-CM

## 2023-10-12 DIAGNOSIS — I48.91 A-FIB (HCC): ICD-10-CM

## 2023-10-12 LAB
ACT BLD: 257 SECS (ref 79–138)
ACT BLD: 263 SECS (ref 79–138)
ECHO BSA: 2.42 M2

## 2023-10-12 PROCEDURE — 7100000000 HC PACU RECOVERY - FIRST 15 MIN: Performed by: INTERNAL MEDICINE

## 2023-10-12 PROCEDURE — 1100000003 HC PRIVATE W/ TELEMETRY

## 2023-10-12 PROCEDURE — 2580000003 HC RX 258: Performed by: NURSE ANESTHETIST, CERTIFIED REGISTERED

## 2023-10-12 PROCEDURE — 2709999900 HC NON-CHARGEABLE SUPPLY: Performed by: INTERNAL MEDICINE

## 2023-10-12 PROCEDURE — 6360000002 HC RX W HCPCS: Performed by: NURSE ANESTHETIST, CERTIFIED REGISTERED

## 2023-10-12 PROCEDURE — C1889 IMPLANT/INSERT DEVICE, NOC: HCPCS | Performed by: INTERNAL MEDICINE

## 2023-10-12 PROCEDURE — 6370000000 HC RX 637 (ALT 250 FOR IP): Performed by: INTERNAL MEDICINE

## 2023-10-12 PROCEDURE — 2580000003 HC RX 258: Performed by: INTERNAL MEDICINE

## 2023-10-12 PROCEDURE — C1769 GUIDE WIRE: HCPCS | Performed by: INTERNAL MEDICINE

## 2023-10-12 PROCEDURE — 3700000000 HC ANESTHESIA ATTENDED CARE: Performed by: INTERNAL MEDICINE

## 2023-10-12 PROCEDURE — 2500000003 HC RX 250 WO HCPCS: Performed by: NURSE ANESTHETIST, CERTIFIED REGISTERED

## 2023-10-12 PROCEDURE — 85347 COAGULATION TIME ACTIVATED: CPT

## 2023-10-12 PROCEDURE — 7100000001 HC PACU RECOVERY - ADDTL 15 MIN: Performed by: INTERNAL MEDICINE

## 2023-10-12 PROCEDURE — 02L73DK OCCLUSION OF LEFT ATRIAL APPENDAGE WITH INTRALUMINAL DEVICE, PERCUTANEOUS APPROACH: ICD-10-PCS | Performed by: INTERNAL MEDICINE

## 2023-10-12 PROCEDURE — C1894 INTRO/SHEATH, NON-LASER: HCPCS | Performed by: INTERNAL MEDICINE

## 2023-10-12 PROCEDURE — 3700000001 HC ADD 15 MINUTES (ANESTHESIA): Performed by: INTERNAL MEDICINE

## 2023-10-12 PROCEDURE — 6360000004 HC RX CONTRAST MEDICATION: Performed by: INTERNAL MEDICINE

## 2023-10-12 PROCEDURE — 2060000000 HC ICU INTERMEDIATE R&B

## 2023-10-12 RX ORDER — HEPARIN SODIUM 10000 [USP'U]/ML
INJECTION, SOLUTION INTRAVENOUS; SUBCUTANEOUS PRN
Status: DISCONTINUED | OUTPATIENT
Start: 2023-10-12 | End: 2023-10-12 | Stop reason: HOSPADM

## 2023-10-12 RX ORDER — CLOPIDOGREL BISULFATE 75 MG/1
300 TABLET ORAL ONCE
Status: COMPLETED | OUTPATIENT
Start: 2023-10-12 | End: 2023-10-12

## 2023-10-12 RX ORDER — PROTAMINE SULFATE 10 MG/ML
INJECTION, SOLUTION INTRAVENOUS PRN
Status: DISCONTINUED | OUTPATIENT
Start: 2023-10-12 | End: 2023-10-12 | Stop reason: SDUPTHER

## 2023-10-12 RX ORDER — ONDANSETRON 2 MG/ML
INJECTION INTRAMUSCULAR; INTRAVENOUS PRN
Status: DISCONTINUED | OUTPATIENT
Start: 2023-10-12 | End: 2023-10-12 | Stop reason: SDUPTHER

## 2023-10-12 RX ORDER — SODIUM CHLORIDE 0.9 % (FLUSH) 0.9 %
5-40 SYRINGE (ML) INJECTION EVERY 12 HOURS SCHEDULED
Status: DISCONTINUED | OUTPATIENT
Start: 2023-10-12 | End: 2023-10-13 | Stop reason: HOSPADM

## 2023-10-12 RX ORDER — SODIUM BICARBONATE 650 MG/1
1300 TABLET ORAL DAILY
Status: DISCONTINUED | OUTPATIENT
Start: 2023-10-12 | End: 2023-10-13 | Stop reason: HOSPADM

## 2023-10-12 RX ORDER — FUROSEMIDE 20 MG/1
20 TABLET ORAL DAILY
Status: DISCONTINUED | OUTPATIENT
Start: 2023-10-12 | End: 2023-10-13 | Stop reason: HOSPADM

## 2023-10-12 RX ORDER — ACETAMINOPHEN 325 MG/1
650 TABLET ORAL EVERY 4 HOURS PRN
Status: DISCONTINUED | OUTPATIENT
Start: 2023-10-12 | End: 2023-10-13 | Stop reason: HOSPADM

## 2023-10-12 RX ORDER — SPIRONOLACTONE 25 MG/1
25 TABLET ORAL DAILY
Status: DISCONTINUED | OUTPATIENT
Start: 2023-10-12 | End: 2023-10-13 | Stop reason: HOSPADM

## 2023-10-12 RX ORDER — SODIUM CHLORIDE 0.9 % (FLUSH) 0.9 %
5-40 SYRINGE (ML) INJECTION PRN
Status: DISCONTINUED | OUTPATIENT
Start: 2023-10-12 | End: 2023-10-13 | Stop reason: HOSPADM

## 2023-10-12 RX ORDER — HEPARIN SODIUM 1000 [USP'U]/ML
INJECTION, SOLUTION INTRAVENOUS; SUBCUTANEOUS PRN
Status: DISCONTINUED | OUTPATIENT
Start: 2023-10-12 | End: 2023-10-12 | Stop reason: SDUPTHER

## 2023-10-12 RX ORDER — SODIUM CHLORIDE 9 MG/ML
INJECTION, SOLUTION INTRAVENOUS PRN
Status: DISCONTINUED | OUTPATIENT
Start: 2023-10-12 | End: 2023-10-13 | Stop reason: HOSPADM

## 2023-10-12 RX ORDER — DEXAMETHASONE SODIUM PHOSPHATE 4 MG/ML
INJECTION, SOLUTION INTRA-ARTICULAR; INTRALESIONAL; INTRAMUSCULAR; INTRAVENOUS; SOFT TISSUE PRN
Status: DISCONTINUED | OUTPATIENT
Start: 2023-10-12 | End: 2023-10-12 | Stop reason: SDUPTHER

## 2023-10-12 RX ORDER — ROCURONIUM BROMIDE 10 MG/ML
INJECTION, SOLUTION INTRAVENOUS PRN
Status: DISCONTINUED | OUTPATIENT
Start: 2023-10-12 | End: 2023-10-12 | Stop reason: SDUPTHER

## 2023-10-12 RX ORDER — VALSARTAN 80 MG/1
80 TABLET ORAL DAILY
Status: DISCONTINUED | OUTPATIENT
Start: 2023-10-12 | End: 2023-10-13 | Stop reason: HOSPADM

## 2023-10-12 RX ORDER — ASPIRIN 325 MG
325 TABLET, DELAYED RELEASE (ENTERIC COATED) ORAL ONCE
Status: COMPLETED | OUTPATIENT
Start: 2023-10-12 | End: 2023-10-12

## 2023-10-12 RX ORDER — ALLOPURINOL 100 MG/1
100 TABLET ORAL EVERY MORNING
Status: DISCONTINUED | OUTPATIENT
Start: 2023-10-13 | End: 2023-10-13 | Stop reason: HOSPADM

## 2023-10-12 RX ORDER — CLOPIDOGREL BISULFATE 75 MG/1
75 TABLET ORAL DAILY
Status: DISCONTINUED | OUTPATIENT
Start: 2023-10-13 | End: 2023-10-13 | Stop reason: HOSPADM

## 2023-10-12 RX ORDER — GEMFIBROZIL 600 MG/1
600 TABLET, FILM COATED ORAL
Status: DISCONTINUED | OUTPATIENT
Start: 2023-10-12 | End: 2023-10-13 | Stop reason: HOSPADM

## 2023-10-12 RX ORDER — ASPIRIN 81 MG/1
81 TABLET ORAL DAILY
Status: DISCONTINUED | OUTPATIENT
Start: 2023-10-13 | End: 2023-10-13

## 2023-10-12 RX ORDER — ASPIRIN 325 MG
325 TABLET, DELAYED RELEASE (ENTERIC COATED) ORAL DAILY
Qty: 30 TABLET | Refills: 11 | Status: SHIPPED | OUTPATIENT
Start: 2023-10-13

## 2023-10-12 RX ORDER — CLOPIDOGREL BISULFATE 75 MG/1
75 TABLET ORAL DAILY
Qty: 90 TABLET | Refills: 1 | Status: SHIPPED | OUTPATIENT
Start: 2023-10-13

## 2023-10-12 RX ORDER — SUCCINYLCHOLINE CHLORIDE 20 MG/ML
INJECTION INTRAMUSCULAR; INTRAVENOUS PRN
Status: DISCONTINUED | OUTPATIENT
Start: 2023-10-12 | End: 2023-10-12 | Stop reason: SDUPTHER

## 2023-10-12 RX ORDER — LIDOCAINE HYDROCHLORIDE 20 MG/ML
INJECTION, SOLUTION EPIDURAL; INFILTRATION; INTRACAUDAL; PERINEURAL PRN
Status: DISCONTINUED | OUTPATIENT
Start: 2023-10-12 | End: 2023-10-12 | Stop reason: SDUPTHER

## 2023-10-12 RX ORDER — 0.9 % SODIUM CHLORIDE 0.9 %
INTRAVENOUS SOLUTION INTRAVENOUS CONTINUOUS PRN
Status: DISCONTINUED | OUTPATIENT
Start: 2023-10-12 | End: 2023-10-12 | Stop reason: SDUPTHER

## 2023-10-12 RX ADMIN — SODIUM CHLORIDE, PRESERVATIVE FREE 10 ML: 5 INJECTION INTRAVENOUS at 20:53

## 2023-10-12 RX ADMIN — SODIUM CHLORIDE 0.23 ML/KG/HR: 9 INJECTION, SOLUTION INTRAVENOUS at 09:05

## 2023-10-12 RX ADMIN — SUCCINYLCHOLINE CHLORIDE 140 MG: 20 INJECTION, SOLUTION INTRAMUSCULAR; INTRAVENOUS at 09:09

## 2023-10-12 RX ADMIN — SPIRONOLACTONE 25 MG: 25 TABLET ORAL at 16:14

## 2023-10-12 RX ADMIN — DEXAMETHASONE SODIUM PHOSPHATE 4 MG: 4 INJECTION, SOLUTION INTRAMUSCULAR; INTRAVENOUS at 09:13

## 2023-10-12 RX ADMIN — CLOPIDOGREL BISULFATE 300 MG: 300 TABLET, FILM COATED ORAL at 16:15

## 2023-10-12 RX ADMIN — ASPIRIN 325 MG: 325 TABLET, COATED ORAL at 16:15

## 2023-10-12 RX ADMIN — VALSARTAN 80 MG: 80 TABLET ORAL at 16:14

## 2023-10-12 RX ADMIN — SUGAMMADEX 200 MG: 100 INJECTION, SOLUTION INTRAVENOUS at 10:22

## 2023-10-12 RX ADMIN — PROPOFOL 140 MG: 10 INJECTION, EMULSION INTRAVENOUS at 09:09

## 2023-10-12 RX ADMIN — PHENYLEPHRINE HYDROCHLORIDE 25 MCG/MIN: 10 INJECTION INTRAVENOUS at 09:09

## 2023-10-12 RX ADMIN — SODIUM CHLORIDE, PRESERVATIVE FREE 10 ML: 5 INJECTION INTRAVENOUS at 16:16

## 2023-10-12 RX ADMIN — SODIUM BICARBONATE 1300 MG: 650 TABLET ORAL at 17:39

## 2023-10-12 RX ADMIN — LEVOTHYROXINE SODIUM 75 MCG: 0.03 TABLET ORAL at 20:53

## 2023-10-12 RX ADMIN — FUROSEMIDE 20 MG: 20 TABLET ORAL at 16:15

## 2023-10-12 RX ADMIN — SODIUM CHLORIDE 500 ML: 9 INJECTION, SOLUTION INTRAVENOUS at 10:25

## 2023-10-12 RX ADMIN — GEMFIBROZIL 600 MG: 600 TABLET ORAL at 16:15

## 2023-10-12 RX ADMIN — LIDOCAINE HYDROCHLORIDE 60 MG: 20 INJECTION, SOLUTION EPIDURAL; INFILTRATION; INTRACAUDAL; PERINEURAL at 09:09

## 2023-10-12 RX ADMIN — PROTAMINE SULFATE 70 MG: 10 INJECTION, SOLUTION INTRAVENOUS at 10:17

## 2023-10-12 RX ADMIN — ONDANSETRON HYDROCHLORIDE 4 MG: 2 INJECTION, SOLUTION INTRAMUSCULAR; INTRAVENOUS at 09:20

## 2023-10-12 RX ADMIN — ROCURONIUM BROMIDE 40 MG: 10 INJECTION INTRAVENOUS at 09:27

## 2023-10-12 RX ADMIN — HEPARIN SODIUM 14000 UNITS: 1000 INJECTION, SOLUTION INTRAVENOUS; SUBCUTANEOUS at 09:41

## 2023-10-12 RX ADMIN — HEPARIN SODIUM 3000 UNITS: 1000 INJECTION, SOLUTION INTRAVENOUS; SUBCUTANEOUS at 09:57

## 2023-10-12 NOTE — PROGRESS NOTES
End of Shift Note    Bedside shift change report given to Jimmy Corea (oncoming nurse) by Giuliana Nicole RN (offgoing nurse). Report included the following information SBAR and Kardex    Shift worked: day     Shift summary and any significant changes:    Pt arrived on unit at 1520 from EP recovery. Pt had a watchman procedure with Tammi Thompson MD and when he was about to discharge form recovery he bled from his groin site. Pt moved to unit for overnight observation. Bedrest until 1830     Concerns for physician to address:       Zone phone for oncoming shift:          Activity:     Number times ambulated in hallways past shift: 0  Number of times OOB to chair past shift: 0    Cardiac:   Cardiac Monitoring: Yes           Access:  Current line(s): PIV     Genitourinary:   Urinary status: voiding    Respiratory:      Chronic home O2 use?: NO  Incentive spirometer at bedside: NO       GI:     Current diet:  ADULT DIET;  Regular; Low Fat/Low Chol/High Fiber/2 gm Na  Passing flatus: YES  Tolerating current diet: YES       Pain Management:   Patient states pain is manageable on current regimen: YES    Skin:     Interventions: specialty bed and increase time out of bed    Patient Safety:  Fall Score:    Interventions: bed/chair alarm, gripper socks, pt to call before getting OOB, and stay with me (per policy)       Length of Stay:  Expected LOS: 1  Actual LOS: 0      Giuliana Nicole RN

## 2023-10-12 NOTE — PROGRESS NOTES
I have reviewed discharge instructions with the PATIENT PARENT GUARDIAN: patient . The  patient  verbalized understanding. Discharge medications reviewed with  patient  and appropriate educational materials and side effects teaching were provided. Follow-up appointments reviewed. Opportunity for questions and clarification was provided. Venous access removed without difficulty. Patient's belongings gathered and sent with patient. Patient is ready for discharge.

## 2023-10-12 NOTE — DISCHARGE INSTRUCTIONS
POST-WATCHMAN DISCHARGE INSTRUCTIONS:    You had a Watchman implant today with Dr. Brandi Johns as part of plan to ultimately get you off longterm blood-thinner. STOP Xarelto. Take the combination of aspirin and clopidogrel daily instead. Please call the office to make an appointment with the nurse practitioner Rosaline Solo in 4-6 weeks [phone]. At that time, an appointment for VERO will be made to visualize how well the Watchman has sealed off the left atrial appendage OR a repeat CT will be done. Do not drive, operate any machinery, or sign any legal documents for 24 hours after your procedure. You must have someone to drive you home. You may take a shower 24 hours after your cardiac procedure. Be sure to get the dressing wet and then remove it; gently wash the area with warm soapy water. Pat dry and leave open to air. To help prevent infections, be sure to keep the cath site clean and dry. No lotions, creams, powders, ointments, etc. in the cath site for approximately 1 week. Do not take a tub bath, get in a hot tub or swimming pool for approximately 5 days or until the cath site is completely healed. No strenuous activity or heavy lifting over 20 lbs. for 7 days. After your procedure, some bruising or discomfort is common during the healing process. Tylenol, 1-2 tablets every 6 hours as needed, is recommended if you experience any discomfort. If you experience any signs or symptoms of infection such as fever, chills, or poorly healing incision, persistent tenderness or swelling in the groin, redness and/or warmth to the touch, numbness, significant tingling or pain at the groin site or affected extremity, rash, drainage from the site, or if the leg feels tight or swollen, call your physician right away. If bleeding at the site occurs, take a clean gauze pad and apply direct pressure to the groin just above the puncture site, and call your physician right away.     If your

## 2023-10-12 NOTE — PROGRESS NOTES
1410: Figure 8 stitch removed by Holley Webb RN. Replaced with quick clot and tegaderm. R groin is CDI. 1430: RN began ambulating with pt in hallway. R groin began bleeding. Hand held pressure applied to r groin and assisted pt back in bed. Hand held pressure applied x10 minutes. New quick clot and tegaderm applied to R groin. RN called Dr. Cathryn Gastelum and MD in agreement of pt staying overnight. MD will put in new orders and states to wait 1 hour to give prescribed plavix and aspirin.

## 2023-10-12 NOTE — PROGRESS NOTES
Cardiac Cath Lab Recovery Arrival Note:      Lola Frank arrived to Cardiac Cath Lab, Recovery Area. Staff introduced to patient. Patient identifiers verified with NAME and DATE OF BIRTH. Procedure verified with patient. Consent forms reviewed and signed by patient or authorized representative and verified. Allergies verified. Patient and family oriented to department. Patient and family informed of procedure and plan of care. Questions answered with review. Patient prepped for procedure, per orders from physician, prior to arrival.    Patient on cardiac monitor, non-invasive blood pressure, SPO2 monitor. On RA. Patient is A&Ox 4. Patient reports no pain. Patient in stretcher, in low position, with side rails up, call bell within reach, patient instructed to call if assistance as needed. Patient prep in: 5 [facility] 2. Patient family has pager # n/a  Family in: Johnathan Gutierrez. Prep by: Kamila Astudillo.

## 2023-10-12 NOTE — H&P
EP/ ARRHYTHMIA Admission Note    Patient ID:  Patient: Henry Roman  MRN: [id number]  Age: 69 y.o.  : 1945    Date of  Admission: 10/12/2023  6:28 AM   PCP:  Mark Sandoval MD    Assessment:   Watchman candidate. Plan:     Given the potential benefits, risks, and alternatives, he agrees to proceed with Watchman. DAPT starting today. Henry Roman is a 69 y.o. male with a history of the above here for his procedure. Past Medical History:   Diagnosis Date    Arthritis     Atrial fibrillation (HCC)     Dr. Jossie Vieira    Hyperlipidemia     Hypertension     Hyperthyroidism 2018    Hypertrophy (benign) of prostate     Kidney disease     stage III    Skin cancer     nose    Thromboembolus ([address])     LLE s/p left knee surgery        Past Surgical History:   Procedure Laterality Date    CARDIAC DEFIBRILLATOR PLACEMENT  2014    CARDIAC ELECTROPHYSIOLOGY MAPPING AND ABLATION      x2    COLON SURGERY      Dr. Ervin Henao    COLONOSCOPY N/A 1/15/2018    COLONOSCOPY performed by José Benson MD at [facility] ENDOSCOPY    COLONOSCOPY,BIOPSY  1/15/2018         COLONOSCOPY,ANDRÉS Garcia  1/15/2018         LUMBAR SPINE SURGERY  1984    REVISION TOTAL KNEE ARTHROPLASTY Left     TOTAL KNEE ARTHROPLASTY Bilateral     UPPER GI ENDOSCOPY,BIOPSY  1/15/2018         VASECTOMY         Social History     Tobacco Use    Smoking status: Former     Packs/day: 1     Types: Cigarettes     Quit date: 2004     Years since quittin.4    Smokeless tobacco: Never   Substance Use Topics    Alcohol use:  Yes     Alcohol/week: 3.0 standard drinks of alcohol     Types: 3 Cans of beer per week        Family History   Problem Relation Age of Onset    COPD Mother     Cancer Mother         throat cancer    Other Father         neck fx and paraplegia    Cancer Father         kidney cancer    Osteoarthritis Sister     Hypertension Sister     Stroke Child         No Known Allergies

## 2023-10-12 NOTE — ANESTHESIA POSTPROCEDURE EVALUATION
Department of Anesthesiology  Postprocedure Note    Patient: Chanda Feliciano  MRN: [id number]  [address] [facility] 1945  Date of evaluation: 10/12/2023      Procedure Summary     Date: 10/12/23 Room / Location: [facility] EP LAB / [facility] CARDIAC CATH LAB    Anesthesia Start: 0644 Anesthesia Stop: 1042    Procedure: Watchman elier closure device Diagnosis: Atrial fibrillation ([address])    Providers: Vane Bowers MD Responsible Provider: Jaspreet Presley DO    Anesthesia Type: General ASA Status: 3          Anesthesia Type: General    Viridiana Phase I: Viridiana Score: 10    Viridiana Phase II:        Anesthesia Post Evaluation    Patient location during evaluation: PACU  Patient participation: complete - patient participated  Level of consciousness: awake and alert  Pain score: 0  Airway patency: patent  Nausea & Vomiting: no nausea and no vomiting  Complications: no  Cardiovascular status: hemodynamically stable  Respiratory status: acceptable and spontaneous ventilation  Hydration status: euvolemic  Pain management: adequate

## 2023-10-12 NOTE — ANESTHESIA PROCEDURE NOTES
Procedure Performed: VERO       Start Time:  10/12/2023 9:05 AM       End Time:      Preanesthesia Checklist:  Patient identified, IV assessed, risks and benefits discussed, monitors and equipment assessed, procedure being performed at surgeon's request and anesthesia consent obtained. General Procedure Information  Diagnostic Indications for Echo:  assessment of surgical repair  Location performed:  OR  Intubated  Bite block placed  Heart visualized  Probe Insertion:  Easy  Probe Type:  Mulitplane and biplane  Modalities:  2D and color flow mapping    Echocardiographic and Doppler Measurements    Ventricles    Right Ventricle:  Cavity size normal.  Hypertrophy not present. Thrombus not present. Global function normal.    Left Ventricle:  Cavity size normal.  Hypertrophy not present. Thrombus not present. Global Function normal.    Other Ventricular Findings:       LVEF >55%    Ventricular Regional Function:  1- Basal Anteroseptal:  normal  2- Basal Anterior:  normal  3- Basal Anterolateral:  normal  4- Basal Inferolateral:  normal  5- Basal Inferior:  normal  6- Basal Inferoseptal:  normal  7- Mid Anteroseptal:  normal  8- Mid Anterior:  normal  9- Mid Anterolateral:  normal  10- Mid Inferolateral:  normal  11- Mid Inferior:  normal  12- Mid Inferoseptal:  normal  13- Apical Anterior:  normal  14- Apical Lateral:  normal  15- Apical Inferior:  normal  16- Apical Septal:  normal  17- [place]:  normal    Wall Motion Comments:       No WMA            Atria    Right Atrium:  Size normal.  Spontaneous echo contrast not present. Thrombus not present. Tumor not present. Device not present. Left Atrium:  Size dilated. Spontaneous echo contrast not present. Thrombus not present. Tumor not present. Device not present.     Left atrial appendage normal.      Septa    Atrial Septum:  Intra-atrial septal morphology normal.      Ventricular Septum:  Intra-ventricular septum morphology normal.          Other

## 2023-10-13 VITALS
HEART RATE: 82 BPM | WEIGHT: 245 LBS | BODY MASS INDEX: 30.46 KG/M2 | DIASTOLIC BLOOD PRESSURE: 77 MMHG | RESPIRATION RATE: 16 BRPM | SYSTOLIC BLOOD PRESSURE: 103 MMHG | OXYGEN SATURATION: 94 % | TEMPERATURE: 98 F | HEIGHT: 75 IN

## 2023-10-13 PROCEDURE — 6370000000 HC RX 637 (ALT 250 FOR IP): Performed by: INTERNAL MEDICINE

## 2023-10-13 PROCEDURE — 2580000003 HC RX 258: Performed by: INTERNAL MEDICINE

## 2023-10-13 RX ORDER — ASPIRIN 325 MG
325 TABLET, DELAYED RELEASE (ENTERIC COATED) ORAL DAILY
Status: DISCONTINUED | OUTPATIENT
Start: 2023-10-13 | End: 2023-10-13 | Stop reason: HOSPADM

## 2023-10-13 RX ADMIN — GEMFIBROZIL 600 MG: 600 TABLET ORAL at 05:46

## 2023-10-13 RX ADMIN — SODIUM CHLORIDE, PRESERVATIVE FREE 10 ML: 5 INJECTION INTRAVENOUS at 09:00

## 2023-10-13 RX ADMIN — SODIUM BICARBONATE 1300 MG: 650 TABLET ORAL at 08:17

## 2023-10-13 RX ADMIN — SPIRONOLACTONE 25 MG: 25 TABLET ORAL at 08:17

## 2023-10-13 RX ADMIN — FUROSEMIDE 20 MG: 20 TABLET ORAL at 08:18

## 2023-10-13 RX ADMIN — ALLOPURINOL 100 MG: 100 TABLET ORAL at 08:17

## 2023-10-13 RX ADMIN — VALSARTAN 80 MG: 80 TABLET ORAL at 08:18

## 2023-10-13 RX ADMIN — ASPIRIN 325 MG: 325 TABLET, COATED ORAL at 08:59

## 2023-10-13 RX ADMIN — CLOPIDOGREL BISULFATE 75 MG: 75 TABLET ORAL at 08:17

## 2023-10-13 NOTE — PROGRESS NOTES
0800 Pt ambulated with RN in hallway. No CP or SOB. Right groin site clean dry and intact, no hematoma. Pt returned to recliner.

## 2023-10-13 NOTE — PROGRESS NOTES
8850 Patient is ambulating at baseline and vitals stable. Right groin site clean, dry, intact. Discharge and follow-up care given. Site care instructions and medication changes reviewed. Patient discharged home with friend as .

## 2023-10-13 NOTE — PROGRESS NOTES
Bedside shift change report given to Dawood Rodriguez RN (oncoming nurse) by Jus Correia RN  (offgoing nurse). Report included the following information Nurse Handoff Report, Index, Adult Overview, Surgery Report, Intake/Output, MAR, Recent Results, Med Rec Status, Cardiac Rhythm Paced, and Alarm Parameters.

## 2023-10-13 NOTE — PROGRESS NOTES
Bedside shift change report given to Danial Carpio RN (oncoming nurse) by Azalea Long RN  (offgoing nurse). Report included the following information Nurse Handoff Report, Index, Adult Overview, Surgery Report, Intake/Output, MAR, Recent Results, Med Rec Status, Cardiac Rhythm Paced, and Alarm Parameters.

## 2023-10-13 NOTE — PLAN OF CARE
Problem: Discharge Planning  Goal: Discharge to home or other facility with appropriate resources  Outcome: Progressing  Flowsheets (Taken 10/12/2023 2330)  Discharge to home or other facility with appropriate resources: Identify barriers to discharge with patient and caregiver     Problem: Safety - Adult  Goal: Free from fall injury  Outcome: Progressing  Flowsheets (Taken 10/12/2023 2300)  Free From Fall Injury: Instruct family/caregiver on patient safety     Problem: Pain  Goal: Verbalizes/displays adequate comfort level or baseline comfort level  Outcome: Progressing     Problem: Skin/Tissue Integrity  Goal: Absence of new skin breakdown  Description: 1. Monitor for areas of redness and/or skin breakdown  2. Assess vascular access sites hourly  3. Every 4-6 hours minimum:  Change oxygen saturation probe site  4. Every 4-6 hours:  If on nasal continuous positive airway pressure, respiratory therapy assess nares and determine need for appliance change or resting period.   Outcome: Progressing

## 2023-10-13 NOTE — PLAN OF CARE
Problem: Discharge Planning  Goal: Discharge to home or other facility with appropriate resources  10/13/2023 0907 by Zaid Tabares RN  Outcome: Adequate for Discharge  10/13/2023 0813 by Lexi Jordan RN  Outcome: Progressing  Flowsheets (Taken 10/12/2023 2330)  Discharge to home or other facility with appropriate resources: Identify barriers to discharge with patient and caregiver     Problem: Safety - Adult  Goal: Free from fall injury  10/13/2023 0907 by Zaid Tabares RN  Outcome: Adequate for Discharge  10/13/2023 0813 by Lexi Jordan RN  Outcome: Progressing  Flowsheets (Taken 10/12/2023 2300)  Free From Fall Injury: Instruct family/caregiver on patient safety     Problem: Pain  Goal: Verbalizes/displays adequate comfort level or baseline comfort level  10/13/2023 0907 by Zaid Tabares RN  Outcome: Adequate for Discharge  10/13/2023 0813 by Lexi Jordan RN  Outcome: Progressing     Problem: Skin/Tissue Integrity  Goal: Absence of new skin breakdown  Description: 1. Monitor for areas of redness and/or skin breakdown  2. Assess vascular access sites hourly  3. Every 4-6 hours minimum:  Change oxygen saturation probe site  4. Every 4-6 hours:  If on nasal continuous positive airway pressure, respiratory therapy assess nares and determine need for appliance change or resting period.   10/13/2023 0907 by Zaid Tabares RN  Outcome: Adequate for Discharge  10/13/2023 0813 by Lexi Jordan RN  Outcome: Progressing

## 2023-11-29 ENCOUNTER — HOSPITAL ENCOUNTER (OUTPATIENT)
Facility: HOSPITAL | Age: 78
Discharge: HOME | End: 2023-12-01
Attending: INTERNAL MEDICINE
Payer: MEDICARE

## 2023-11-29 VITALS
DIASTOLIC BLOOD PRESSURE: 87 MMHG | BODY MASS INDEX: 30.62 KG/M2 | WEIGHT: 245 LBS | SYSTOLIC BLOOD PRESSURE: 142 MMHG | HEART RATE: 80 BPM | OXYGEN SATURATION: 95 % | RESPIRATION RATE: 19 BRPM

## 2023-11-29 DIAGNOSIS — Z95.818 PRESENCE OF WATCHMAN LEFT ATRIAL APPENDAGE CLOSURE DEVICE: ICD-10-CM

## 2023-11-29 PROCEDURE — 6360000002 HC RX W HCPCS: Performed by: INTERNAL MEDICINE

## 2023-11-29 PROCEDURE — 93320 DOPPLER ECHO COMPLETE: CPT

## 2023-11-29 PROCEDURE — 6370000000 HC RX 637 (ALT 250 FOR IP): Performed by: INTERNAL MEDICINE

## 2023-11-29 PROCEDURE — 99152 MOD SED SAME PHYS/QHP 5/>YRS: CPT

## 2023-11-29 RX ORDER — MIDAZOLAM HYDROCHLORIDE 1 MG/ML
INJECTION INTRAMUSCULAR; INTRAVENOUS PRN
Status: DISCONTINUED | OUTPATIENT
Start: 2023-11-29 | End: 2023-11-29

## 2023-11-29 RX ORDER — LIDOCAINE HYDROCHLORIDE 20 MG/ML
SOLUTION OROPHARYNGEAL PRN
Status: DISCONTINUED | OUTPATIENT
Start: 2023-11-29 | End: 2023-11-29

## 2023-11-29 RX ORDER — FENTANYL CITRATE 50 UG/ML
INJECTION, SOLUTION INTRAMUSCULAR; INTRAVENOUS PRN
Status: DISCONTINUED | OUTPATIENT
Start: 2023-11-29 | End: 2023-11-29

## 2023-11-29 RX ADMIN — MIDAZOLAM HYDROCHLORIDE 1 MG: 1 INJECTION, SOLUTION INTRAMUSCULAR; INTRAVENOUS at 08:14

## 2023-11-29 RX ADMIN — FENTANYL CITRATE 50 MCG: 50 INJECTION, SOLUTION INTRAMUSCULAR; INTRAVENOUS at 08:12

## 2023-11-29 RX ADMIN — LIDOCAINE HYDROCHLORIDE 10 ML: 20 SOLUTION ORAL at 08:05

## 2023-11-29 RX ADMIN — BENZOCAINE, BUTAMBEN, AND TETRACAINE HYDROCHLORIDE 1 SPRAY: .028; .004; .004 AEROSOL, SPRAY TOPICAL at 08:06

## 2023-11-29 RX ADMIN — MIDAZOLAM HYDROCHLORIDE 2 MG: 1 INJECTION, SOLUTION INTRAMUSCULAR; INTRAVENOUS at 08:11

## 2023-11-29 NOTE — PROGRESS NOTES
Patient arrived to Non-Invasive Cardiology Lab for Out Patient VERO Procedure. Staff introduced to patient. Patient identifiers verified with Name and Date of Birth. Procedure verified with patient. Consent forms reviewed and signed by patient or authorized representative and verified. Allergies verified. Patient informed of procedure and plan of care. Questions answered with review. Patient on cardiac monitor, non-invasive blood pressure, SPO2 monitor. On room air. Patient is A&Ox3. Patient reports no complaints. Patient on stretcher, in low position, with side rails up. Patient instructed to call for assistance as needed. Family in waiting room.

## 2023-11-29 NOTE — PROGRESS NOTES
Patient returned to his baseline activity level. Discharge instructions reviewed and all questions and concerns were answered. Saline locked IV removed without complication. Patient wheeled to hospital entrance with all of his belongings where his brother-in-law was waiting to drive home.

## 2023-11-29 NOTE — PROGRESS NOTES
Status post transesophageal echocardiography revealing the Watchman is in good position, no evidence of adherent thrombus or torsten-device leak. At least moderate MR, EF ~normal, biatrial enlargement. There is a known BIV-ICD without evidence of lead vegetation. Full report to follow. OK to go to  mg daily 6 months post-implant.

## 2023-11-29 NOTE — H&P
EP/ ARRHYTHMIA    Patient ID:  Patient: Armen Alanis  MRN: [id number]  Age: 69 y.o.  : 1945    Date of  Admission: 2023  6:59 AM   PCP:  Moises Pierre MD    Assessment:   Watchman implant 10/12/2023. Plan:     Given the potential benefits, risks, and alternatives, he agrees to proceed with TRANSESOPHAGEAL ECHOCARDIOGRAPHY to evaluate the Maile Black is a 69 y.o. male with a history of the above here for his procedure. Past Medical History:   Diagnosis Date    Arthritis     Atrial fibrillation (HCC)     Dr. Dao Gonzalez    Hyperlipidemia     Hypertension     Hyperthyroidism 2018    Hypertrophy (benign) of prostate     Kidney disease     stage III    Skin cancer     nose    Thromboembolus ([address])     LLE s/p left knee surgery        Past Surgical History:   Procedure Laterality Date    CARDIAC DEFIBRILLATOR PLACEMENT  2014    CARDIAC ELECTROPHYSIOLOGY MAPPING AND ABLATION      x2    COLON SURGERY      Dr. Lexi Welsh    COLONOSCOPY N/A 1/15/2018    COLONOSCOPY performed by Federico Boucher MD at [facility] ENDOSCOPY    COLONOSCOPY,BIOPSY  1/15/2018         COLONOSCOPYANDRÉS  1/15/2018         EP DEVICE PROCEDURE N/A 10/12/2023    Watchman elier closure device performed by Bg Ross MD at [address] Left     TOTAL KNEE ARTHROPLASTY Bilateral     UPPER GI ENDOSCOPY,BIOPSY  1/15/2018         VASECTOMY         Social History     Tobacco Use    Smoking status: Former     Packs/day: 1     Types: Cigarettes     Quit date: 2004     Years since quittin.5    Smokeless tobacco: Never   Substance Use Topics    Alcohol use:  Yes     Alcohol/week: 3.0 standard drinks of alcohol     Types: 3 Cans of beer per week        Family History   Problem Relation Age of Onset    COPD Mother     Cancer Mother         throat cancer    Other Father

## 2023-11-29 NOTE — DISCHARGE INSTRUCTIONS
provider of heart care. Please don't hesitate to let us know if there is anything we can do to enhance your experience with us.      Signed:  Александр Wilder MD  11/29/2023

## 2023-11-30 LAB
ECHO MV REGURGITANT ALIASING (NYQUIST) VELOCITY: 39 CM/S
ECHO MV REGURGITANT RADIUS PISA: 0.67 CM

## 2024-02-19 RX ORDER — ERGOCALCIFEROL 1.25 MG/1
50000 CAPSULE ORAL WEEKLY
COMMUNITY

## 2024-02-19 RX ORDER — ACETAMINOPHEN/DIPHENHYDRAMINE 500MG-25MG
1 TABLET ORAL NIGHTLY PRN
COMMUNITY

## 2024-02-20 ENCOUNTER — ANESTHESIA (OUTPATIENT)
Facility: HOSPITAL | Age: 79
End: 2024-02-20
Payer: MEDICARE

## 2024-02-20 ENCOUNTER — HOSPITAL ENCOUNTER (OUTPATIENT)
Facility: HOSPITAL | Age: 79
Setting detail: OUTPATIENT SURGERY
Discharge: HOME | End: 2024-02-20
Attending: SPECIALIST | Admitting: SPECIALIST
Payer: MEDICARE

## 2024-02-20 ENCOUNTER — ANESTHESIA EVENT (OUTPATIENT)
Facility: HOSPITAL | Age: 79
End: 2024-02-20
Payer: MEDICARE

## 2024-02-20 VITALS
DIASTOLIC BLOOD PRESSURE: 90 MMHG | SYSTOLIC BLOOD PRESSURE: 141 MMHG | BODY MASS INDEX: 31.08 KG/M2 | TEMPERATURE: 97.8 F | RESPIRATION RATE: 15 BRPM | OXYGEN SATURATION: 98 % | HEIGHT: 75 IN | HEART RATE: 80 BPM | WEIGHT: 250 LBS

## 2024-02-20 PROCEDURE — 88305 TISSUE EXAM BY PATHOLOGIST: CPT

## 2024-02-20 PROCEDURE — 3700000000 HC ANESTHESIA ATTENDED CARE: Performed by: SPECIALIST

## 2024-02-20 PROCEDURE — 2580000003 HC RX 258: Performed by: SPECIALIST

## 2024-02-20 PROCEDURE — 3700000001 HC ADD 15 MINUTES (ANESTHESIA): Performed by: SPECIALIST

## 2024-02-20 PROCEDURE — 6360000002 HC RX W HCPCS: Performed by: ANESTHESIOLOGY

## 2024-02-20 PROCEDURE — 2709999900 HC NON-CHARGEABLE SUPPLY: Performed by: SPECIALIST

## 2024-02-20 PROCEDURE — 7100000010 HC PHASE II RECOVERY - FIRST 15 MIN: Performed by: SPECIALIST

## 2024-02-20 PROCEDURE — 3600007512: Performed by: SPECIALIST

## 2024-02-20 PROCEDURE — 7100000011 HC PHASE II RECOVERY - ADDTL 15 MIN: Performed by: SPECIALIST

## 2024-02-20 PROCEDURE — 2500000003 HC RX 250 WO HCPCS: Performed by: ANESTHESIOLOGY

## 2024-02-20 PROCEDURE — 3600007502: Performed by: SPECIALIST

## 2024-02-20 RX ORDER — SODIUM CHLORIDE 9 MG/ML
25 INJECTION, SOLUTION INTRAVENOUS PRN
Status: DISCONTINUED | OUTPATIENT
Start: 2024-02-20 | End: 2024-02-20 | Stop reason: HOSPADM

## 2024-02-20 RX ORDER — LIDOCAINE HYDROCHLORIDE 20 MG/ML
INJECTION, SOLUTION EPIDURAL; INFILTRATION; INTRACAUDAL; PERINEURAL PRN
Status: DISCONTINUED | OUTPATIENT
Start: 2024-02-20 | End: 2024-02-20 | Stop reason: SDUPTHER

## 2024-02-20 RX ORDER — SODIUM CHLORIDE 0.9 % (FLUSH) 0.9 %
5-40 SYRINGE (ML) INJECTION EVERY 12 HOURS SCHEDULED
Status: DISCONTINUED | OUTPATIENT
Start: 2024-02-20 | End: 2024-02-20 | Stop reason: HOSPADM

## 2024-02-20 RX ORDER — SODIUM CHLORIDE 0.9 % (FLUSH) 0.9 %
5-40 SYRINGE (ML) INJECTION PRN
Status: DISCONTINUED | OUTPATIENT
Start: 2024-02-20 | End: 2024-02-20 | Stop reason: HOSPADM

## 2024-02-20 RX ORDER — SODIUM CHLORIDE 9 MG/ML
INJECTION, SOLUTION INTRAVENOUS CONTINUOUS PRN
Status: COMPLETED | OUTPATIENT
Start: 2024-02-20 | End: 2024-02-20

## 2024-02-20 RX ADMIN — PROPOFOL 180 MG: 10 INJECTION, EMULSION INTRAVENOUS at 10:33

## 2024-02-20 RX ADMIN — LIDOCAINE HYDROCHLORIDE 40 MG: 20 INJECTION, SOLUTION EPIDURAL; INFILTRATION; INTRACAUDAL; PERINEURAL at 10:13

## 2024-02-20 ASSESSMENT — PAIN - FUNCTIONAL ASSESSMENT: PAIN_FUNCTIONAL_ASSESSMENT: NONE - DENIES PAIN

## 2024-02-20 NOTE — H&P
Gastroenterology Outpatient History and Physical    Patient: Viraj Carpenter Jr    Physician: Eren Pedro MD    Vital Signs: Blood pressure (!) 151/103, pulse 80, temperature 97.8 °F (36.6 °C), temperature source Temporal, resp. rate 12, height 1.905 m (6' 3\"), weight 113.4 kg (250 lb), SpO2 98 %.    Allergies: No Known Allergies    Chief Complaint: Villous adenoma s/p resection    History of Present Illness: above    Justification for Procedure: above    History:  Past Medical History:   Diagnosis Date    Arthritis     Atrial fibrillation (HCC)     Dr. Darnell/Dr. Jones    Hx of blood clots     Hyperlipidemia     Hypertension     Hyperthyroidism 2018    Hypertrophy (benign) of prostate     Kidney disease     stage III    Skin cancer     nose    Thromboembolus (HCC)     LLE s/p left knee surgery      Past Surgical History:   Procedure Laterality Date    CARDIAC DEFIBRILLATOR PLACEMENT  2014    CARDIAC ELECTROPHYSIOLOGY MAPPING AND ABLATION      x2    COLON SURGERY      Dr. Alanis    COLONOSCOPY N/A 1/15/2018    COLONOSCOPY performed by Hong Huang MD at [facility] ENDOSCOPY    COLONOSCOPY,BIOPSY  1/15/2018         COLONOSCOPY,REMV SRIKANTH,SNARE  1/15/2018         EP DEVICE PROCEDURE N/A 10/12/2023    Watchman elier closure device performed by Samy Darnell MD at [facility] CARDIAC CATH LAB    LUMBAR SPINE SURGERY  1984    REVISION TOTAL KNEE ARTHROPLASTY Left     TOTAL KNEE ARTHROPLASTY Bilateral     UPPER GI ENDOSCOPY,BIOPSY  1/15/2018         VASECTOMY        Social History     Socioeconomic History    Marital status:      Spouse name: None    Number of children: None    Years of education: None    Highest education level: None   Tobacco Use    Smoking status: Former     Current packs/day: 0.00     Types: Cigarettes     Quit date: 2004     Years since quittin.7    Smokeless tobacco: Never   Vaping Use    Vaping Use: Never used   Substance and Sexual Activity    Alcohol use: Yes

## 2024-02-20 NOTE — PROGRESS NOTES
Endoscopy Case End Note:    1033:  Procedure scope was pre-cleaned, per protocol, at bedside by SANTO Trejo      1038:  Report received from anesthesia - Dr. Singer.  See anesthesia flowsheet for intra-procedure vital signs and events.    1039:  glasses returned to patient.

## 2024-02-20 NOTE — ANESTHESIA PRE PROCEDURE
13.2 10/05/2023 10:07 AM     10/05/2023 10:07 AM       CMP:   Lab Results   Component Value Date/Time     10/05/2023 10:07 AM    K 4.0 10/05/2023 10:07 AM     10/05/2023 10:07 AM    CO2 26 10/05/2023 10:07 AM    BUN 27 10/05/2023 10:07 AM    CREATININE 1.49 10/05/2023 10:07 AM    LABGLOM 48 10/05/2023 10:07 AM    GLUCOSE 116 10/05/2023 10:07 AM    CALCIUM 9.6 10/05/2023 10:07 AM       POC Tests: No results for input(s): \"POCGLU\", \"POCNA\", \"POCK\", \"POCCL\", \"POCBUN\", \"POCHEMO\", \"POCHCT\" in the last 72 hours.    Coags: No results found for: \"PROTIME\", \"INR\", \"APTT\"    HCG (If Applicable): No results found for: \"PREGTESTUR\", \"PREGSERUM\", \"HCG\", \"HCGQUANT\"     ABGs: No results found for: \"PHART\", \"PO2ART\", \"HSH9XMH\", \"EMV7ZQS\", \"BEART\", \"Y3XOFOFP\"     Type & Screen (If Applicable):  No results found for: \"LABABO\", \"LABRH\"    Drug/Infectious Status (If Applicable):  No results found for: \"HIV\", \"HEPCAB\"    COVID-19 Screening (If Applicable):   Lab Results   Component Value Date/Time    COVID19 Not Detected 11/16/2020 03:24 PM           Anesthesia Evaluation  Patient summary reviewed and Nursing notes reviewed  Airway: Mallampati: II          Dental: normal exam         Pulmonary:Negative Pulmonary ROS breath sounds clear to auscultation                             Cardiovascular:  Exercise tolerance: good (>4 METS)  (+) hypertension: moderate, pacemaker: AICD, dysrhythmias: atrial fibrillation      ECG reviewed  Rhythm: regular  Rate: normal           Beta Blocker:  Not on Beta Blocker         Neuro/Psych:   (+) neuromuscular disease:            GI/Hepatic/Renal: Neg GI/Hepatic/Renal ROS  (+) renal disease: CRI          Endo/Other: Negative Endo/Other ROS   (+) hyperthyroidism::..          Pt had no PAT visit       Abdominal:             Vascular: negative vascular ROS.         Other Findings:         Anesthesia Plan      MAC     ASA 3       Induction: intravenous.  VERO  MIPS: Prophylactic antiemetics

## 2024-02-20 NOTE — OP NOTE
Colonoscopy Procedure Note    Indications:   Personal history of colon cancer (screening only)    Referring Physician: Jeffrey Schultz MD  Anesthesia/Sedation: MAC anesthesia Propofol  Endoscopist:  Dr. Eren Pedro    Procedure in Detail:  Informed consent was obtained for the procedure, including sedation.  Risks of perforation, hemorrhage, adverse drug reaction, and aspiration were discussed. The patient was placed in the left lateral decubitus position.  Based on the pre-procedure assessment, including review of the patient's medical history, medications, allergies, and review of systems, he had been deemed to be an appropriate candidate for moderate sedation; he was therefore sedated with the medications listed above.   The patient was monitored continuously with ECG tracing, pulse oximetry, blood pressure monitoring, and direct observations.      A rectal examination was performed. The AES244QG was inserted into the rectum and advanced under direct vision to the cecum, which was identified by the ileocecal valve and appendiceal orifice.  The quality of the colonic preparation was adequate.  A careful inspection was made as the colonoscope was withdrawn, including a retroflexed view of the rectum; findings and interventions are described below.  Appropriate photodocumentation was obtained.    Findings:    Scope advanced to the cecum.   Preparation was adequate.   (1) sessile polyp in the descending colon 5 mm s/p cold snare snare removal   (1) sessile 8 mm polyp in the sigmoid colon s/p hot snare removal   (1) sessile 7 mm polyp in the distal sigmoid colon s/p hot snare removal   Diffuse diverticulosis of sigmoid colon.   Anastomosis in the descending colon: patent   Internal hemorrhoids.    Therapies:  see above    Specimen: Specimens were collected as described above and sent to pathology.     Complications: None were encountered during the procedure.     EBL: < 10 ml.    Recommendations:     -

## 2024-02-20 NOTE — PROGRESS NOTES
ARRIVAL INFORMATION:  Verified patient name and date of birth, scheduled procedure, and informed consent.     : Hong (brother in law) contact number: [phone]  Physician and staff can share information with the .     Belongings with patient include:  Clothing,Glasses, Dentures upper and lower    GI FOCUSED ASSESSMENT:  Neuro: Awake, alert, oriented x4  Respiratory: even and unlabored   GI: soft and non-distended  EKG Rhythm: paced    Education:Reviewed general discharge instructions and  information.

## 2024-02-20 NOTE — DISCHARGE INSTRUCTIONS
Viraj Carpenter   [id number]  1945    COLON DISCHARGE INSTRUCTIONS  Discomfort:  Redness at IV site- apply warm compress to area; if redness or soreness persist- contact your physician  There may be a slight amount of blood passed from the rectum  Gaseous discomfort- walking, belching will help relieve any discomfort  You may not operate a vehicle for 12 hours  You may not engage in an occupation involving machinery or appliances for rest of today  You may not drink alcoholic beverages for at least 12 hours  Avoid making any critical decisions for at least 24 hour  DIET:   Regular diet.   - however -  remember your colon is empty and a heavy meal will produce gas.   Avoid these foods:  vegetables, fried / greasy foods, carbonated drinks for today.    MEDICATIONS:        Regarding Aspirin or Nonsteroidal medications, please see below.    ACTIVITY:  You may resume your normal daily activities it is recommended that you spend the remainder of the day resting -  avoid any strenuous activity.    CALL M.D.  ANY SIGN OF:  Increasing pain, nausea, vomiting  Abdominal distension (swelling)  New increased bleeding (oral or rectal)  Fever (chills)  Pain in chest area  Bloody discharge from nose or mouth  Shortness of breath   Tylenol as needed for pain.      Follow-up Instructions:   Call Dr. Pedro for results of procedure / biopsy in 4-5 days at telephone #  [phone]              Repeat Colonoscopy in 3 years              Resume Plavix in 2 days    Findings:  Colon Polyps (3) removed    Patient Education on Sedation / Analgesia Administered for Procedure      For 24 hours after general anesthesia or intravenous analgesia / sedation:  Have someone responsible help you with your care  Limit your activities  Do not drive and operate hazardous machinery  Do not make important personal, legal or business decisions  Do not drink alcoholic beverages  If you have not urinated within 8 hours after discharge, please contact

## 2024-10-30 NOTE — PROGRESS NOTES
F/U for esophageal ulcers  Likely colon cancer. S: Mr. Alina Iraheta was seen by me today during rounds. At this time, he is resting + comfortably. Tolerating liquids well. The patient has no new complaints today. Please see admission consult for details of ROS; there are no other changes today. O: Blood pressure 120/76, pulse 100, temperature 98.2 °F (36.8 °C), resp. rate 18, height 6' 4\" (1.93 m), weight 86.5 kg (190 lb 11.2 oz), SpO2 100 %. Gen: Patient is in no acute distress. There is no jaundice. Lungs: Clear to auscultation bilaterally . Heart:+RRR. Abd: Soft, non tender, non-distended, bowel sounds present. Extremities: Warm. Cross sectional imaging:  IMPRESSION  IMPRESSION: Colonoscopic clips are present, as described. No acute finding        A: Principal Problem:    Atrial fibrillation with RVR ([address] (1/12/2018)    Active Problems:    Weight loss (1/12/2018)        Comment:  The cancer is likely in the mid descending colon. The esophageal ulcer rx will depend on bx. Colon bx out tomorrow. P:  I spoke in person to DR HEYDI NEUMANN.   Await biopsies  Possible surgery 1.18.2018  I spoke to Dr. Romain Arriaga MD  5:07 PM  1/16/2018 Airway patent. Mouth with normal mucosa.

## (undated) DEVICE — STAPLER INT L90MM REG VERY THCK TISS TI LN LNAR PROX

## (undated) DEVICE — IV START KIT: Brand: MEDLINE

## (undated) DEVICE — NEEDLE HYPO 18GA L1.5IN PNK S STL HUB POLYPR SHLD REG BVL

## (undated) DEVICE — CUFF BLD PRSS AD CLTH SGL TB W/ BAYNT CONN ROUNDED CORNER

## (undated) DEVICE — JELLY,LUBE,STERILE,FLIP TOP,TUBE,4-OZ: Brand: MEDLINE

## (undated) DEVICE — CATH IV AUTOGRD BC PNK 20GA 25 -- INSYTE

## (undated) DEVICE — SYR 10ML LUER LOK 1/5ML GRAD --

## (undated) DEVICE — TRAY CATHETER 16FR F INCLUDE LUB URIN M TEMP SENS 2 STATLOK STBL

## (undated) DEVICE — REM POLYHESIVE ADULT PATIENT RETURN ELECTRODE: Brand: VALLEYLAB

## (undated) DEVICE — MEDI-VAC YANK SUCT HNDL W/TPRD BULBOUS TIP: Brand: CARDINAL HEALTH

## (undated) DEVICE — DRAPE,REIN 53X77,STERILE: Brand: MEDLINE

## (undated) DEVICE — DRAPE,LAPAROTOMY,PCH,STERILE: Brand: MEDLINE

## (undated) DEVICE — SNARE ENDOSCP M L240CM W27MM SHTH DIA2.4MM CHN 2.8MM OVL

## (undated) DEVICE — KENDALL RADIOLUCENT FOAM MONITORING ELECTRODE RECTANGULAR SHAPE: Brand: KENDALL

## (undated) DEVICE — STERILE POLYISOPRENE POWDER-FREE SURGICAL GLOVES: Brand: PROTEXIS

## (undated) DEVICE — ENDOSCOPIC KIT COMPLIANCE ENDOKIT

## (undated) DEVICE — TRAY,IRRIGATION,PISTON SYRINGE,60ML,STRL: Brand: MEDLINE

## (undated) DEVICE — SURGICAL PROCEDURE PACK BASIN MAJ SET CUST NO CAUT

## (undated) DEVICE — BASIN EMSIS 16OZ GRAPHITE PLAS KID SHP MOLD GRAD FOR ORAL

## (undated) DEVICE — Z DISCONTINUED PER MEDLINE LINE GAS SAMPLING O2/CO2 LNG AD 13 FT NSL W/ TBNG FILTERLINE

## (undated) DEVICE — STAIN INDIA INK IN NACL 10ML --

## (undated) DEVICE — Z DISCONTINUEDSOLUTION PREP 2OZ 10% POVIDONE IOD SCR CAP BTL

## (undated) DEVICE — BASIC PACK: Brand: CONVERTORS

## (undated) DEVICE — CATHETER URET L70CM DIA5FR POLYUR SPRL OLV TIP W/ ADPT

## (undated) DEVICE — MEDI-TRACE CADENCE ADULT, DEFIBRILLATION ELECTRODE -RTS  (10 PR/PK) - PHYSIO-CONTROL: Brand: MEDI-TRACE CADENCE

## (undated) DEVICE — DISPOSABLE GRASPER CARTRIDGE: Brand: DIRECT DRIVE REPOSABLE GRASPERS

## (undated) DEVICE — IMPLANTABLE DEVICE
Type: IMPLANTABLE DEVICE | Status: NON-FUNCTIONAL
Removed: 2022-05-10

## (undated) DEVICE — ELECTRODE PT RET AD L9FT HI MOIST COND ADH HYDRGEL CORDED

## (undated) DEVICE — SNARE ENDOSCP L240CM LOOP W27MM SHTH DIA2.4MM WRK CHN 2.8MM

## (undated) DEVICE — SOLIDIFIER MEDC 1200ML -- CONVERT TO 356117

## (undated) DEVICE — STRAINER URIN CALC RNL MSH -- CONVERT TO ITEM 357634

## (undated) DEVICE — NEONATAL-ADULT SPO2 SENSOR: Brand: NELLCOR

## (undated) DEVICE — 1200 GUARD II KIT W/5MM TUBE W/O VAC TUBE: Brand: GUARDIAN

## (undated) DEVICE — DRAPE,ROBOTICS,STERILE: Brand: MEDLINE

## (undated) DEVICE — SYR 3ML LL TIP 1/10ML GRAD --

## (undated) DEVICE — SUT SLK 0 30IN SH BLK --

## (undated) DEVICE — HANDLE LT SNAP ON ULT DURABLE LENS FOR TRUMPF ALC DISPOSABLE

## (undated) DEVICE — SUTURE VCRL SZ 2-0 L36IN ABSRB VLT L36MM CT-1 1/2 CIR J345H

## (undated) DEVICE — PLASMABLADE PS200-040 4.0: Brand: PLASMABLADE™

## (undated) DEVICE — TOWEL 4 PLY TISS 19X30 SUE WHT

## (undated) DEVICE — SOLUTION IV 1000ML 0.9% SOD CHL

## (undated) DEVICE — 3M™ IOBAN™ 2 ANTIMICROBIAL INCISE DRAPE 6650EZ: Brand: IOBAN™ 2

## (undated) DEVICE — CONTAINER SPEC 20 ML LID NEUT BUFF FORMALIN 10 % POLYPR STS

## (undated) DEVICE — Device

## (undated) DEVICE — FORCEPS BX L160CM DIA8MM GRSP DISECT CUP TIP NONLOCKING ROT

## (undated) DEVICE — STERILE POLYISOPRENE POWDER-FREE SURGICAL GLOVES WITH EMOLLIENT COATING: Brand: PROTEXIS

## (undated) DEVICE — SUTURE VCRL SZ 3-0 L27IN ABSRB UD L26MM SH 1/2 CIR J416H

## (undated) DEVICE — STERILE-Z MAYO STAND COVERS CLEAR POLYETHYLENE STERILE UNIVERSAL FIT 20 PER CASE: Brand: STERILE-Z

## (undated) DEVICE — VISUALIZATION SYSTEM: Brand: CLEARIFY

## (undated) DEVICE — BLOCK BITE ENDOSCP AD 21 MM W/ DIL BLU LF DISP

## (undated) DEVICE — BAG SPEC BIOHZRD 10 X 10 IN --

## (undated) DEVICE — CYSTOSCOPY PACK: Brand: CONVERTORS

## (undated) DEVICE — TRAP SUC MUCOUS 70ML -- MEDICHOICE MEDLINE

## (undated) DEVICE — TROCAR: Brand: KII SLEEVE

## (undated) DEVICE — ESOPHAGEAL BALLOON DILATATION CATHETER: Brand: CRE FIXED WIRE

## (undated) DEVICE — CATH URET 5FRX70CM POLYUR -- CONVERT TO ITEM 106403

## (undated) DEVICE — CLIP INT L235CM WRK CHAN DIA2.8MM OPN 11MM LCK MECHANISM MR

## (undated) DEVICE — SUTURE MCRYL SZ 4-0 L27IN ABSRB UD L19MM PS-2 1/2 CIR PRIM Y426H

## (undated) DEVICE — Device: Brand: MEDEX

## (undated) DEVICE — BLUNT TROCAR WITH THREADED ANCHOR: Brand: VERSAONE

## (undated) DEVICE — FORCEPS BX L240CM JAW DIA2.8MM L CAP W/ NDL MIC MESH TOOTH

## (undated) DEVICE — NON-REM POLYHESIVE PATIENT RETURN ELECTRODE: Brand: VALLEYLAB

## (undated) DEVICE — NEEDLE SCLERO 25GA L240CM OD0.51MM ID0.24MM EXTN L4MM SHTH

## (undated) DEVICE — ROCKER SWITCH PENCIL BLADE ELECTRODE, HOLSTER: Brand: EDGE

## (undated) DEVICE — ADHESIVE SKIN CLSR 1ML TISS HI VISC EXOFIN

## (undated) DEVICE — ADHESIVE SKIN CLOSURE HI VISC MIC 0.5 CC PREMIERPRO EXOFIN

## (undated) DEVICE — STAPLER INT L75MM CUT LN L73MM STPL LN L77MM BLU B FRM 8

## (undated) DEVICE — ADPT CATH URETH 2IN LF --

## (undated) DEVICE — SOLUTION IRRIG 3000ML H2O STRL BAG

## (undated) DEVICE — ASTOUND STANDARD SURGICAL GOWN, XL: Brand: CONVERTORS

## (undated) DEVICE — SET GRAV CK VLV NEEDLESS ST 3 GANGED 4WAY STPCOCK HI FLO 10

## (undated) DEVICE — COLON CLOSING PACK: Brand: MEDLINE INDUSTRIES, INC.

## (undated) DEVICE — SKIN MARKER,REGULAR TIP WITH RULER AND LABELS: Brand: DEVON

## (undated) DEVICE — SYR ASSEMB INFL BLLN 60ML --

## (undated) DEVICE — DEVON™ KNEE AND BODY STRAP 60" X 3" (1.5 M X 7.6 CM): Brand: DEVON

## (undated) DEVICE — SOLUTION IRRIGATION H2O 0797305] ICU MEDICAL INC]

## (undated) DEVICE — SET ADMIN 16ML TBNG L100IN 2 Y INJ SITE IV PIGGY BK DISP

## (undated) DEVICE — TUBING IRRIG L77IN DIA0.241IN L BOR FOR CYSTO W/ NVENT

## (undated) DEVICE — SUTURE SZ 0 27IN 5/8 CIR UR-6  TAPER PT VIOLET ABSRB VICRYL J603H

## (undated) DEVICE — SUTURE VCRL 2-0 L27IN ABSRB UD PS-2 L19MM 1/2 CIR J428H

## (undated) DEVICE — SUTURE COAT VCRL SZ 4-0 L18IN ABSRB UD L19MM PS-2 1/2 CIR J496G

## (undated) DEVICE — SYRINGE 50ML E/T

## (undated) DEVICE — TRAP ENDOSCP POLYP 2 CHMBR DRAWER TYP

## (undated) DEVICE — ACCESS PLATFORM FOR MINIMALLY INVASIVE SURGERY.: Brand: GELPORT® LAPAROSCOPIC  SYSTEM

## (undated) DEVICE — PACEMAKER SETUP: Brand: MEDLINE INDUSTRIES, INC.

## (undated) DEVICE — INFECTION CONTROL KIT SYS

## (undated) DEVICE — SUTURE PDS II SZ 0 L60IN ABSRB VLT L48MM CTX 1/2 CIR Z990G

## (undated) DEVICE — MARYLAND JAW LAPAROSCOPIC SEALER/DIVIDER: Brand: LIGASURE

## (undated) DEVICE — TROCAR: Brand: KII FIOS FIRST ENTRY

## (undated) DEVICE — SUTURE VCRL SZ 3-0 L27IN ABSRB VLT SH L26MM 1/2 CIR J784G